# Patient Record
Sex: FEMALE | Race: WHITE | Employment: OTHER | URBAN - METROPOLITAN AREA
[De-identification: names, ages, dates, MRNs, and addresses within clinical notes are randomized per-mention and may not be internally consistent; named-entity substitution may affect disease eponyms.]

---

## 2017-03-27 DIAGNOSIS — K21.00 GASTROESOPHAGEAL REFLUX DISEASE WITH ESOPHAGITIS: ICD-10-CM

## 2017-03-27 RX ORDER — OMEPRAZOLE 20 MG/1
20 CAPSULE, DELAYED RELEASE ORAL DAILY
Qty: 90 CAPSULE | Refills: 3 | Status: SHIPPED | OUTPATIENT
Start: 2017-03-27 | End: 2017-10-16 | Stop reason: SDUPTHER

## 2017-10-03 ENCOUNTER — TELEPHONE (OUTPATIENT)
Dept: INTERNAL MEDICINE CLINIC | Age: 82
End: 2017-10-03

## 2017-10-16 ENCOUNTER — OFFICE VISIT (OUTPATIENT)
Dept: INTERNAL MEDICINE CLINIC | Age: 82
End: 2017-10-16

## 2017-10-16 VITALS
WEIGHT: 152 LBS | HEART RATE: 72 BPM | HEIGHT: 69 IN | BODY MASS INDEX: 22.51 KG/M2 | SYSTOLIC BLOOD PRESSURE: 134 MMHG | TEMPERATURE: 97.7 F | DIASTOLIC BLOOD PRESSURE: 82 MMHG

## 2017-10-16 DIAGNOSIS — E55.9 HYPOVITAMINOSIS D: ICD-10-CM

## 2017-10-16 DIAGNOSIS — K21.9 GASTROESOPHAGEAL REFLUX DISEASE WITHOUT ESOPHAGITIS: ICD-10-CM

## 2017-10-16 DIAGNOSIS — Z00.00 ANNUAL PHYSICAL EXAM: Primary | ICD-10-CM

## 2017-10-16 DIAGNOSIS — K21.00 GASTROESOPHAGEAL REFLUX DISEASE WITH ESOPHAGITIS: ICD-10-CM

## 2017-10-16 DIAGNOSIS — E78.00 PURE HYPERCHOLESTEROLEMIA: ICD-10-CM

## 2017-10-16 LAB
A/G RATIO: 1.9 (ref 1.1–2.2)
ALBUMIN SERPL-MCNC: 4.3 G/DL (ref 3.4–5)
ALP BLD-CCNC: 49 U/L (ref 40–129)
ALT SERPL-CCNC: 10 U/L (ref 10–40)
ANION GAP SERPL CALCULATED.3IONS-SCNC: 12 MMOL/L (ref 3–16)
AST SERPL-CCNC: 17 U/L (ref 15–37)
BILIRUB SERPL-MCNC: 0.5 MG/DL (ref 0–1)
BUN BLDV-MCNC: 14 MG/DL (ref 7–20)
CALCIUM SERPL-MCNC: 9.2 MG/DL (ref 8.3–10.6)
CHLORIDE BLD-SCNC: 102 MMOL/L (ref 99–110)
CHOLESTEROL, TOTAL: 224 MG/DL (ref 0–199)
CO2: 28 MMOL/L (ref 21–32)
CREAT SERPL-MCNC: 1 MG/DL (ref 0.6–1.2)
GFR AFRICAN AMERICAN: >60
GFR NON-AFRICAN AMERICAN: 53
GLOBULIN: 2.3 G/DL
GLUCOSE BLD-MCNC: 86 MG/DL (ref 70–99)
HCT VFR BLD CALC: 39.8 % (ref 36–48)
HDLC SERPL-MCNC: 50 MG/DL (ref 40–60)
HEMOGLOBIN: 13.1 G/DL (ref 12–16)
LDL CHOLESTEROL CALCULATED: 153 MG/DL
MCH RBC QN AUTO: 31.1 PG (ref 26–34)
MCHC RBC AUTO-ENTMCNC: 33 G/DL (ref 31–36)
MCV RBC AUTO: 94.3 FL (ref 80–100)
PDW BLD-RTO: 14 % (ref 12.4–15.4)
PLATELET # BLD: 198 K/UL (ref 135–450)
PMV BLD AUTO: 10.1 FL (ref 5–10.5)
POTASSIUM SERPL-SCNC: 4.4 MMOL/L (ref 3.5–5.1)
RBC # BLD: 4.22 M/UL (ref 4–5.2)
SODIUM BLD-SCNC: 142 MMOL/L (ref 136–145)
TOTAL PROTEIN: 6.6 G/DL (ref 6.4–8.2)
TRIGL SERPL-MCNC: 105 MG/DL (ref 0–150)
TSH SERPL DL<=0.05 MIU/L-ACNC: 3.45 UIU/ML (ref 0.27–4.2)
VITAMIN D 25-HYDROXY: 26.7 NG/ML
VLDLC SERPL CALC-MCNC: 21 MG/DL
WBC # BLD: 5.7 K/UL (ref 4–11)

## 2017-10-16 PROCEDURE — 4040F PNEUMOC VAC/ADMIN/RCVD: CPT | Performed by: INTERNAL MEDICINE

## 2017-10-16 PROCEDURE — 1036F TOBACCO NON-USER: CPT | Performed by: INTERNAL MEDICINE

## 2017-10-16 PROCEDURE — G8420 CALC BMI NORM PARAMETERS: HCPCS | Performed by: INTERNAL MEDICINE

## 2017-10-16 PROCEDURE — 1123F ACP DISCUSS/DSCN MKR DOCD: CPT | Performed by: INTERNAL MEDICINE

## 2017-10-16 PROCEDURE — G8484 FLU IMMUNIZE NO ADMIN: HCPCS | Performed by: INTERNAL MEDICINE

## 2017-10-16 PROCEDURE — G8399 PT W/DXA RESULTS DOCUMENT: HCPCS | Performed by: INTERNAL MEDICINE

## 2017-10-16 PROCEDURE — 36415 COLL VENOUS BLD VENIPUNCTURE: CPT | Performed by: INTERNAL MEDICINE

## 2017-10-16 PROCEDURE — 1090F PRES/ABSN URINE INCON ASSESS: CPT | Performed by: INTERNAL MEDICINE

## 2017-10-16 PROCEDURE — 99214 OFFICE O/P EST MOD 30 MIN: CPT | Performed by: INTERNAL MEDICINE

## 2017-10-16 PROCEDURE — G8427 DOCREV CUR MEDS BY ELIG CLIN: HCPCS | Performed by: INTERNAL MEDICINE

## 2017-10-16 RX ORDER — OMEPRAZOLE 20 MG/1
20 CAPSULE, DELAYED RELEASE ORAL DAILY
Qty: 90 CAPSULE | Refills: 3 | Status: SHIPPED | OUTPATIENT
Start: 2017-10-16 | End: 2018-11-27

## 2017-10-16 ASSESSMENT — PATIENT HEALTH QUESTIONNAIRE - PHQ9
SUM OF ALL RESPONSES TO PHQ9 QUESTIONS 1 & 2: 0
1. LITTLE INTEREST OR PLEASURE IN DOING THINGS: 0
SUM OF ALL RESPONSES TO PHQ QUESTIONS 1-9: 0
2. FEELING DOWN, DEPRESSED OR HOPELESS: 0

## 2017-10-16 ASSESSMENT — ENCOUNTER SYMPTOMS
VOICE CHANGE: 1
RESPIRATORY NEGATIVE: 1
GASTROINTESTINAL NEGATIVE: 1

## 2017-10-16 NOTE — PROGRESS NOTES
Cardiovascular: Negative. Gastrointestinal: Negative. Genitourinary: Negative. Musculoskeletal: Negative. Skin: Negative. Neurological: Negative. Psychiatric/Behavioral: Negative. Objective:   Physical Exam   Constitutional: She is oriented to person, place, and time. She appears well-developed and well-nourished. No distress. HENT:   Head: Normocephalic. Right Ear: External ear normal.   Nose: Nose normal.   Mouth/Throat: Oropharynx is clear and moist. No oropharyngeal exudate. Eyes: Conjunctivae and EOM are normal.   Neck: Normal range of motion. Neck supple. No JVD present. No thyromegaly present. Cardiovascular: Normal rate, regular rhythm and intact distal pulses. Pulmonary/Chest: Effort normal and breath sounds normal. No stridor. She has no rales. Abdominal: Soft. There is no tenderness. Genitourinary: Vagina normal.   Musculoskeletal: Normal range of motion. She exhibits no tenderness. Neurological: She is alert and oriented to person, place, and time. She has normal reflexes. She exhibits normal muscle tone. Skin: Skin is warm and dry. She is not diaphoretic. No erythema. Psychiatric: She has a normal mood and affect. Her behavior is normal. Judgment and thought content normal.       Assessment:      Encounter Diagnoses   Name Primary?  Annual physical exam Yes    Gastroesophageal reflux disease without esophagitis     Pure hypercholesterolemia     Gastroesophageal reflux disease with esophagitis     Hypovitaminosis D            Plan:      Maegan Castro was seen today for medicare awv.     Diagnoses and all orders for this visit:    Annual physical exam  -     TSH without Reflex  -     CBC  -     POCT Urinalysis no Micro    Gastroesophageal reflux disease without esophagitis    Pure hypercholesterolemia  -     Lipid Panel  -     Comprehensive Metabolic Panel  -     CBC    Gastroesophageal reflux disease with esophagitis  -     omeprazole (PRILOSEC) 20 MG delayed release capsule; Take 1 capsule by mouth daily    Hypovitaminosis D  -     Vitamin D 25 Hydroxy      Referred her to ENT for voice changes.

## 2018-01-18 ENCOUNTER — OFFICE VISIT (OUTPATIENT)
Dept: INTERNAL MEDICINE CLINIC | Age: 83
End: 2018-01-18

## 2018-01-18 VITALS
WEIGHT: 155 LBS | DIASTOLIC BLOOD PRESSURE: 84 MMHG | TEMPERATURE: 98.1 F | BODY MASS INDEX: 22.89 KG/M2 | SYSTOLIC BLOOD PRESSURE: 140 MMHG | HEART RATE: 64 BPM

## 2018-01-18 DIAGNOSIS — K21.00 GASTROESOPHAGEAL REFLUX DISEASE WITH ESOPHAGITIS: ICD-10-CM

## 2018-01-18 DIAGNOSIS — E78.00 PURE HYPERCHOLESTEROLEMIA: ICD-10-CM

## 2018-01-18 DIAGNOSIS — M17.11 ARTHRITIS OF RIGHT KNEE: ICD-10-CM

## 2018-01-18 DIAGNOSIS — J02.9 PHARYNGITIS, UNSPECIFIED ETIOLOGY: Primary | ICD-10-CM

## 2018-01-18 PROCEDURE — G8399 PT W/DXA RESULTS DOCUMENT: HCPCS | Performed by: INTERNAL MEDICINE

## 2018-01-18 PROCEDURE — 99214 OFFICE O/P EST MOD 30 MIN: CPT | Performed by: INTERNAL MEDICINE

## 2018-01-18 PROCEDURE — 4040F PNEUMOC VAC/ADMIN/RCVD: CPT | Performed by: INTERNAL MEDICINE

## 2018-01-18 PROCEDURE — 1090F PRES/ABSN URINE INCON ASSESS: CPT | Performed by: INTERNAL MEDICINE

## 2018-01-18 PROCEDURE — 1036F TOBACCO NON-USER: CPT | Performed by: INTERNAL MEDICINE

## 2018-01-18 PROCEDURE — G8427 DOCREV CUR MEDS BY ELIG CLIN: HCPCS | Performed by: INTERNAL MEDICINE

## 2018-01-18 PROCEDURE — G8484 FLU IMMUNIZE NO ADMIN: HCPCS | Performed by: INTERNAL MEDICINE

## 2018-01-18 PROCEDURE — G8420 CALC BMI NORM PARAMETERS: HCPCS | Performed by: INTERNAL MEDICINE

## 2018-01-18 PROCEDURE — 1123F ACP DISCUSS/DSCN MKR DOCD: CPT | Performed by: INTERNAL MEDICINE

## 2018-01-18 RX ORDER — AMOXICILLIN AND CLAVULANATE POTASSIUM 875; 125 MG/1; MG/1
1 TABLET, FILM COATED ORAL 2 TIMES DAILY WITH MEALS
Qty: 20 TABLET | Refills: 0 | Status: SHIPPED | OUTPATIENT
Start: 2018-01-18 | End: 2018-01-28

## 2018-01-18 ASSESSMENT — ENCOUNTER SYMPTOMS
CHOKING: 0
STRIDOR: 0
BACK PAIN: 0
VOICE CHANGE: 1
EYES NEGATIVE: 1
SORE THROAT: 1
RESPIRATORY NEGATIVE: 1
SINUS PRESSURE: 0
COLOR CHANGE: 0
APNEA: 0

## 2018-06-15 ENCOUNTER — OFFICE VISIT (OUTPATIENT)
Dept: INTERNAL MEDICINE CLINIC | Age: 83
End: 2018-06-15

## 2018-06-15 VITALS
DIASTOLIC BLOOD PRESSURE: 70 MMHG | WEIGHT: 148.5 LBS | BODY MASS INDEX: 21.93 KG/M2 | HEART RATE: 72 BPM | OXYGEN SATURATION: 96 % | SYSTOLIC BLOOD PRESSURE: 122 MMHG | TEMPERATURE: 98.1 F

## 2018-06-15 DIAGNOSIS — E78.00 PURE HYPERCHOLESTEROLEMIA: ICD-10-CM

## 2018-06-15 DIAGNOSIS — R53.83 FATIGUE, UNSPECIFIED TYPE: Primary | ICD-10-CM

## 2018-06-15 LAB
BILIRUBIN, POC: NORMAL
BLOOD URINE, POC: NORMAL
CLARITY, POC: CLEAR
COLOR, POC: YELLOW
GLUCOSE URINE, POC: NORMAL
KETONES, POC: NORMAL
LEUKOCYTE EST, POC: NORMAL
NITRITE, POC: NORMAL
PH, POC: 5
PROTEIN, POC: NORMAL
SPECIFIC GRAVITY, POC: 1.02
UROBILINOGEN, POC: 0.2

## 2018-06-15 PROCEDURE — 1090F PRES/ABSN URINE INCON ASSESS: CPT | Performed by: INTERNAL MEDICINE

## 2018-06-15 PROCEDURE — 81002 URINALYSIS NONAUTO W/O SCOPE: CPT | Performed by: INTERNAL MEDICINE

## 2018-06-15 PROCEDURE — G8427 DOCREV CUR MEDS BY ELIG CLIN: HCPCS | Performed by: INTERNAL MEDICINE

## 2018-06-15 PROCEDURE — 1036F TOBACCO NON-USER: CPT | Performed by: INTERNAL MEDICINE

## 2018-06-15 PROCEDURE — 1123F ACP DISCUSS/DSCN MKR DOCD: CPT | Performed by: INTERNAL MEDICINE

## 2018-06-15 PROCEDURE — G8399 PT W/DXA RESULTS DOCUMENT: HCPCS | Performed by: INTERNAL MEDICINE

## 2018-06-15 PROCEDURE — 4040F PNEUMOC VAC/ADMIN/RCVD: CPT | Performed by: INTERNAL MEDICINE

## 2018-06-15 PROCEDURE — G8420 CALC BMI NORM PARAMETERS: HCPCS | Performed by: INTERNAL MEDICINE

## 2018-06-15 PROCEDURE — 99214 OFFICE O/P EST MOD 30 MIN: CPT | Performed by: INTERNAL MEDICINE

## 2018-06-15 ASSESSMENT — ENCOUNTER SYMPTOMS
RESPIRATORY NEGATIVE: 1
GASTROINTESTINAL NEGATIVE: 1
COUGH: 0

## 2018-08-27 ENCOUNTER — OFFICE VISIT (OUTPATIENT)
Dept: INTERNAL MEDICINE CLINIC | Age: 83
End: 2018-08-27

## 2018-08-27 VITALS
OXYGEN SATURATION: 97 % | HEART RATE: 58 BPM | TEMPERATURE: 98.1 F | DIASTOLIC BLOOD PRESSURE: 80 MMHG | BODY MASS INDEX: 22.45 KG/M2 | SYSTOLIC BLOOD PRESSURE: 132 MMHG | WEIGHT: 148.13 LBS | HEIGHT: 68 IN

## 2018-08-27 DIAGNOSIS — E55.9 HYPOVITAMINOSIS D: ICD-10-CM

## 2018-08-27 DIAGNOSIS — M54.50 BILATERAL LOW BACK PAIN WITHOUT SCIATICA, UNSPECIFIED CHRONICITY: Primary | ICD-10-CM

## 2018-08-27 DIAGNOSIS — M81.0 OSTEOPOROSIS WITHOUT CURRENT PATHOLOGICAL FRACTURE, UNSPECIFIED OSTEOPOROSIS TYPE: ICD-10-CM

## 2018-08-27 DIAGNOSIS — E78.00 PURE HYPERCHOLESTEROLEMIA: ICD-10-CM

## 2018-08-27 PROCEDURE — G8399 PT W/DXA RESULTS DOCUMENT: HCPCS | Performed by: INTERNAL MEDICINE

## 2018-08-27 PROCEDURE — 1036F TOBACCO NON-USER: CPT | Performed by: INTERNAL MEDICINE

## 2018-08-27 PROCEDURE — 1090F PRES/ABSN URINE INCON ASSESS: CPT | Performed by: INTERNAL MEDICINE

## 2018-08-27 PROCEDURE — G8427 DOCREV CUR MEDS BY ELIG CLIN: HCPCS | Performed by: INTERNAL MEDICINE

## 2018-08-27 PROCEDURE — 99214 OFFICE O/P EST MOD 30 MIN: CPT | Performed by: INTERNAL MEDICINE

## 2018-08-27 PROCEDURE — 4040F PNEUMOC VAC/ADMIN/RCVD: CPT | Performed by: INTERNAL MEDICINE

## 2018-08-27 PROCEDURE — 1123F ACP DISCUSS/DSCN MKR DOCD: CPT | Performed by: INTERNAL MEDICINE

## 2018-08-27 PROCEDURE — G8420 CALC BMI NORM PARAMETERS: HCPCS | Performed by: INTERNAL MEDICINE

## 2018-08-27 PROCEDURE — 1101F PT FALLS ASSESS-DOCD LE1/YR: CPT | Performed by: INTERNAL MEDICINE

## 2018-08-27 RX ORDER — CELECOXIB 200 MG/1
200 CAPSULE ORAL DAILY
Qty: 30 CAPSULE | Refills: 1 | Status: SHIPPED | OUTPATIENT
Start: 2018-08-27 | End: 2020-08-07

## 2018-08-27 ASSESSMENT — ENCOUNTER SYMPTOMS
EYES NEGATIVE: 1
CHOKING: 0
BACK PAIN: 1
APNEA: 0
ABDOMINAL PAIN: 0
STRIDOR: 0
SINUS PRESSURE: 0
COLOR CHANGE: 0

## 2018-08-27 NOTE — PROGRESS NOTES
Subjective:      Patient ID: Stevenson Larkin is a 80 y.o. female. Patient presents with:  Back Pain: pt fell about 5 months ago. wasn't badly hurt but still has some lower bilateral back pain. affecting her walking. Stevenson Larkin is a 80 y.o. female with the following history as recorded in [employer]:  Patient Active Problem List    GERD (gastroesophageal reflux disease)      Hyperlipidemia      Current Outpatient Prescriptions:  celecoxib (CELEBREX) 200 MG capsule, Take 1 capsule by mouth daily, Disp: 30 capsule, Rfl: 1  diclofenac sodium 1 % GEL, Apply 2 g topically 2 times daily, Disp: 1 Tube, Rfl: 3  omeprazole (PRILOSEC) 20 MG delayed release capsule, Take 1 capsule by mouth daily, Disp: 90 capsule, Rfl: 3    No current facility-administered medications for this visit. Allergies: Levofloxacin  Past Medical History:  No date: Allergic rhinitis  No date: GERD (gastroesophageal reflux disease)  No date: Hyperlipidemia  06/23/2016: Laceration of head  Past Surgical History:  No date: APPENDECTOMY  4-2011: COLONOSCOPY  8/2010: FRACTURE SURGERY      Comment: left Ulna  No date: KNEE SURGERY      Comment: left torn meniscus  Review of patient's family history indicates:  Problem: Arthritis      Relation: Mother       Age of Onset: (Not Specified)   Problem: Heart Attack      Relation: Father       Age of Onset: (Not Specified)     Smoking status: Never Smoker                                                              Smokeless tobacco: Former User                     Alcohol use: No                  Back Pain   This is a new problem. The current episode started more than 1 month ago. The problem occurs constantly. The problem has been gradually worsening since onset. The pain is present in the lumbar spine and thoracic spine. The quality of the pain is described as aching. The pain does not radiate. The pain is moderate. The symptoms are aggravated by lying down and standing. Stiffness is present all day. Associated symptoms include weight loss. Pertinent negatives include no abdominal pain. Risk factors include recent trauma (fall 4-5m ago,before ). She has tried nothing for the symptoms. The treatment provided no relief. Review of Systems   Constitutional: Positive for appetite change, fatigue and weight loss. HENT: Negative. Negative for ear pain, hearing loss and sinus pressure. Eyes: Negative. Respiratory: Negative for apnea, choking and stridor. Gastrointestinal: Negative for abdominal pain. Genitourinary: Positive for frequency. Negative for hematuria. Musculoskeletal: Positive for back pain. Skin: Negative. Negative for color change and pallor. Neurological: Negative. Negative for dizziness, tremors, seizures and syncope. Hematological: Does not bruise/bleed easily. Psychiatric/Behavioral: Negative for confusion, decreased concentration and dysphoric mood. The patient is not nervous/anxious. Objective:   Physical Exam   Constitutional: She is oriented to person, place, and time. She appears well-developed and well-nourished. HENT:   Head: Normocephalic and atraumatic. Right Ear: External ear normal.   Left Ear: External ear normal.   Mouth/Throat: No oropharyngeal exudate. Eyes: Pupils are equal, round, and reactive to light. Conjunctivae and EOM are normal. No scleral icterus. Neck: Normal range of motion. Neck supple. No thyromegaly present. Cardiovascular: Normal rate, regular rhythm and normal heart sounds. No murmur heard. Pulmonary/Chest: Effort normal and breath sounds normal. She has no wheezes. She exhibits no tenderness. Abdominal: Soft. She exhibits no distension and no mass. There is no tenderness. Musculoskeletal: Normal range of motion. She exhibits tenderness. She exhibits no edema. Both side of para spinal muscles are sore. Lymphadenopathy:     She has no cervical adenopathy.    Neurological: She is alert and oriented to person,

## 2018-08-27 NOTE — PATIENT INSTRUCTIONS
Please call your pharmacy if you need any refills of your medication(s). Please call our office at [phone] if you don't hear from us about your test results. Bring an accurate list of your medications with you at every appointment to ensure that we have the correct information.     Our office hours are: Monday - Friday 8:30 am- 5 pm    Phone lines turn on at 8:30 am

## 2018-08-28 ENCOUNTER — NURSE ONLY (OUTPATIENT)
Dept: INTERNAL MEDICINE CLINIC | Age: 83
End: 2018-08-28

## 2018-08-28 DIAGNOSIS — E78.00 PURE HYPERCHOLESTEROLEMIA: ICD-10-CM

## 2018-08-28 DIAGNOSIS — E55.9 HYPOVITAMINOSIS D: ICD-10-CM

## 2018-08-28 DIAGNOSIS — M81.0 OSTEOPOROSIS WITHOUT CURRENT PATHOLOGICAL FRACTURE, UNSPECIFIED OSTEOPOROSIS TYPE: ICD-10-CM

## 2018-08-28 LAB
A/G RATIO: 2.2 (ref 1.1–2.2)
ALBUMIN SERPL-MCNC: 4.4 G/DL (ref 3.4–5)
ALP BLD-CCNC: 48 U/L (ref 40–129)
ALT SERPL-CCNC: 9 U/L (ref 10–40)
ANION GAP SERPL CALCULATED.3IONS-SCNC: 12 MMOL/L (ref 3–16)
AST SERPL-CCNC: 16 U/L (ref 15–37)
BILIRUB SERPL-MCNC: 0.4 MG/DL (ref 0–1)
BILIRUBIN, POC: NORMAL
BLOOD URINE, POC: NORMAL
BUN BLDV-MCNC: 16 MG/DL (ref 7–20)
CALCIUM SERPL-MCNC: 9.4 MG/DL (ref 8.3–10.6)
CHLORIDE BLD-SCNC: 105 MMOL/L (ref 99–110)
CHOLESTEROL, TOTAL: 209 MG/DL (ref 0–199)
CLARITY, POC: CLEAR
CO2: 28 MMOL/L (ref 21–32)
COLOR, POC: NORMAL
CREAT SERPL-MCNC: 1.1 MG/DL (ref 0.6–1.2)
GFR AFRICAN AMERICAN: 57
GFR NON-AFRICAN AMERICAN: 47
GLOBULIN: 2 G/DL
GLUCOSE BLD-MCNC: 87 MG/DL (ref 70–99)
GLUCOSE URINE, POC: NORMAL
HCT VFR BLD CALC: 39.8 % (ref 36–48)
HDLC SERPL-MCNC: 49 MG/DL (ref 40–60)
HEMOGLOBIN: 13 G/DL (ref 12–16)
KETONES, POC: NORMAL
LDL CHOLESTEROL CALCULATED: 140 MG/DL
LEUKOCYTE EST, POC: NORMAL
MCH RBC QN AUTO: 31.6 PG (ref 26–34)
MCHC RBC AUTO-ENTMCNC: 32.7 G/DL (ref 31–36)
MCV RBC AUTO: 96.4 FL (ref 80–100)
NITRITE, POC: NORMAL
PDW BLD-RTO: 13.9 % (ref 12.4–15.4)
PH, POC: 5
PLATELET # BLD: 210 K/UL (ref 135–450)
PMV BLD AUTO: 10.1 FL (ref 5–10.5)
POTASSIUM SERPL-SCNC: 4.6 MMOL/L (ref 3.5–5.1)
PROTEIN, POC: NORMAL
RBC # BLD: 4.13 M/UL (ref 4–5.2)
SODIUM BLD-SCNC: 145 MMOL/L (ref 136–145)
SPECIFIC GRAVITY, POC: 1.02
TOTAL PROTEIN: 6.4 G/DL (ref 6.4–8.2)
TRIGL SERPL-MCNC: 98 MG/DL (ref 0–150)
TSH SERPL DL<=0.05 MIU/L-ACNC: 4.67 UIU/ML (ref 0.27–4.2)
UROBILINOGEN, POC: 0.2
VITAMIN D 25-HYDROXY: 30.8 NG/ML
VLDLC SERPL CALC-MCNC: 20 MG/DL
WBC # BLD: 4.6 K/UL (ref 4–11)

## 2018-08-28 PROCEDURE — 36415 COLL VENOUS BLD VENIPUNCTURE: CPT | Performed by: INTERNAL MEDICINE

## 2018-08-28 PROCEDURE — 81002 URINALYSIS NONAUTO W/O SCOPE: CPT | Performed by: INTERNAL MEDICINE

## 2018-11-27 ENCOUNTER — OFFICE VISIT (OUTPATIENT)
Dept: INTERNAL MEDICINE CLINIC | Age: 83
End: 2018-11-27
Payer: MEDICARE

## 2018-11-27 VITALS
HEART RATE: 77 BPM | SYSTOLIC BLOOD PRESSURE: 118 MMHG | OXYGEN SATURATION: 97 % | BODY MASS INDEX: 22.43 KG/M2 | DIASTOLIC BLOOD PRESSURE: 72 MMHG | TEMPERATURE: 98.2 F | WEIGHT: 145.38 LBS

## 2018-11-27 DIAGNOSIS — J06.9 ACUTE URI: Primary | ICD-10-CM

## 2018-11-27 PROCEDURE — 1036F TOBACCO NON-USER: CPT | Performed by: INTERNAL MEDICINE

## 2018-11-27 PROCEDURE — G8420 CALC BMI NORM PARAMETERS: HCPCS | Performed by: INTERNAL MEDICINE

## 2018-11-27 PROCEDURE — 1090F PRES/ABSN URINE INCON ASSESS: CPT | Performed by: INTERNAL MEDICINE

## 2018-11-27 PROCEDURE — 1123F ACP DISCUSS/DSCN MKR DOCD: CPT | Performed by: INTERNAL MEDICINE

## 2018-11-27 PROCEDURE — 4040F PNEUMOC VAC/ADMIN/RCVD: CPT | Performed by: INTERNAL MEDICINE

## 2018-11-27 PROCEDURE — G8484 FLU IMMUNIZE NO ADMIN: HCPCS | Performed by: INTERNAL MEDICINE

## 2018-11-27 PROCEDURE — G8427 DOCREV CUR MEDS BY ELIG CLIN: HCPCS | Performed by: INTERNAL MEDICINE

## 2018-11-27 PROCEDURE — 99213 OFFICE O/P EST LOW 20 MIN: CPT | Performed by: INTERNAL MEDICINE

## 2018-11-27 PROCEDURE — 1101F PT FALLS ASSESS-DOCD LE1/YR: CPT | Performed by: INTERNAL MEDICINE

## 2018-11-27 PROCEDURE — G8399 PT W/DXA RESULTS DOCUMENT: HCPCS | Performed by: INTERNAL MEDICINE

## 2018-11-27 RX ORDER — AMOXICILLIN AND CLAVULANATE POTASSIUM 875; 125 MG/1; MG/1
1 TABLET, FILM COATED ORAL 2 TIMES DAILY WITH MEALS
Qty: 20 TABLET | Refills: 0 | Status: SHIPPED | OUTPATIENT
Start: 2018-11-27 | End: 2018-12-07

## 2018-11-27 RX ORDER — GUAIFENESIN AND CODEINE PHOSPHATE 100; 10 MG/5ML; MG/5ML
10 SOLUTION ORAL 3 TIMES DAILY PRN
Qty: 120 ML | Refills: 0 | Status: SHIPPED | OUTPATIENT
Start: 2018-11-27 | End: 2018-12-04

## 2018-11-27 ASSESSMENT — ENCOUNTER SYMPTOMS
GASTROINTESTINAL NEGATIVE: 1
COUGH: 1
RHINORRHEA: 1
SORE THROAT: 1

## 2018-11-27 NOTE — PROGRESS NOTES
Encounter Diagnosis   Name Primary?  Acute URI Yes           Plan:      Sly was seen today for hoarse. Diagnoses and all orders for this visit:    Acute URI  -     amoxicillin-clavulanate (AUGMENTIN) 875-125 MG per tablet; Take 1 tablet by mouth 2 times daily (with meals) for 10 days  -     guaiFENesin-codeine (TUSSI-ORGANIDIN NR) 100-10 MG/5ML syrup; Take 10 mLs by mouth 3 times daily as needed for Cough for up to 7 days. Anny Juarez MD

## 2019-01-14 ENCOUNTER — TELEPHONE (OUTPATIENT)
Dept: INTERNAL MEDICINE CLINIC | Age: 84
End: 2019-01-14

## 2019-01-14 ENCOUNTER — NURSE TRIAGE (OUTPATIENT)
Dept: OTHER | Facility: CLINIC | Age: 84
End: 2019-01-14

## 2019-01-15 ENCOUNTER — OFFICE VISIT (OUTPATIENT)
Dept: INTERNAL MEDICINE CLINIC | Age: 84
End: 2019-01-15
Payer: MEDICARE

## 2019-01-15 VITALS
OXYGEN SATURATION: 96 % | WEIGHT: 145 LBS | DIASTOLIC BLOOD PRESSURE: 68 MMHG | HEART RATE: 64 BPM | TEMPERATURE: 98.1 F | SYSTOLIC BLOOD PRESSURE: 118 MMHG | BODY MASS INDEX: 22.38 KG/M2
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
## 2019-01-15 DIAGNOSIS — E78.00 PURE HYPERCHOLESTEROLEMIA: ICD-10-CM

## 2019-01-15 DIAGNOSIS — R93.0 ABNORMAL CT OF THE HEAD: ICD-10-CM

## 2019-01-15 PROCEDURE — G8399 PT W/DXA RESULTS DOCUMENT: HCPCS | Performed by: INTERNAL MEDICINE

## 2019-01-15 PROCEDURE — 99214 OFFICE O/P EST MOD 30 MIN: CPT | Performed by: INTERNAL MEDICINE

## 2019-01-15 PROCEDURE — 1123F ACP DISCUSS/DSCN MKR DOCD: CPT | Performed by: INTERNAL MEDICINE

## 2019-01-15 PROCEDURE — 1101F PT FALLS ASSESS-DOCD LE1/YR: CPT | Performed by: INTERNAL MEDICINE

## 2019-01-15 PROCEDURE — G8420 CALC BMI NORM PARAMETERS: HCPCS | Performed by: INTERNAL MEDICINE

## 2019-01-15 PROCEDURE — 1090F PRES/ABSN URINE INCON ASSESS: CPT | Performed by: INTERNAL MEDICINE

## 2019-01-15 PROCEDURE — G8427 DOCREV CUR MEDS BY ELIG CLIN: HCPCS | Performed by: INTERNAL MEDICINE

## 2019-01-15 PROCEDURE — G8484 FLU IMMUNIZE NO ADMIN: HCPCS | Performed by: INTERNAL MEDICINE

## 2019-01-15 PROCEDURE — 1036F TOBACCO NON-USER: CPT | Performed by: INTERNAL MEDICINE

## 2019-01-15 PROCEDURE — 4040F PNEUMOC VAC/ADMIN/RCVD: CPT | Performed by: INTERNAL MEDICINE

## 2019-01-15 ASSESSMENT — ENCOUNTER SYMPTOMS
ABDOMINAL PAIN: 0
VISUAL CHANGE: 1
VOMITING: 1

## 2019-02-22 ENCOUNTER — TELEPHONE (OUTPATIENT)
Dept: INTERNAL MEDICINE CLINIC | Age: 84
End: 2019-02-22

## 2019-03-05 ENCOUNTER — TELEPHONE (OUTPATIENT)
Dept: INTERNAL MEDICINE CLINIC | Age: 84
End: 2019-03-05

## 2019-03-05 DIAGNOSIS — R29.6 FALLING EPISODES: Primary | ICD-10-CM

## 2019-03-05 DIAGNOSIS — R90.89 ABNORMAL BRAIN MRI: ICD-10-CM

## 2019-03-06 ENCOUNTER — TELEPHONE (OUTPATIENT)
Dept: INTERNAL MEDICINE CLINIC | Age: 84
End: 2019-03-06

## 2019-07-03 ENCOUNTER — TELEPHONE (OUTPATIENT)
Dept: INTERNAL MEDICINE CLINIC | Age: 84
End: 2019-07-03

## 2019-07-16 ENCOUNTER — OFFICE VISIT (OUTPATIENT)
Dept: INTERNAL MEDICINE CLINIC | Age: 84
End: 2019-07-16
Payer: MEDICARE

## 2019-07-16 VITALS
DIASTOLIC BLOOD PRESSURE: 60 MMHG | WEIGHT: 140.4 LBS | SYSTOLIC BLOOD PRESSURE: 100 MMHG | HEART RATE: 69 BPM | OXYGEN SATURATION: 75 % | TEMPERATURE: 98.3 F | BODY MASS INDEX: 21.28 KG/M2 | HEIGHT: 68 IN

## 2019-07-16 DIAGNOSIS — M19.90 ARTHRITIS: ICD-10-CM

## 2019-07-16 DIAGNOSIS — R90.89 ABNORMAL BRAIN MRI: ICD-10-CM

## 2019-07-16 DIAGNOSIS — E78.00 PURE HYPERCHOLESTEROLEMIA: ICD-10-CM

## 2019-07-16 DIAGNOSIS — R29.6 FALLING EPISODES: ICD-10-CM

## 2019-07-16 DIAGNOSIS — R29.898 WEAKNESS OF BOTH LEGS: Primary | ICD-10-CM

## 2019-07-16 PROCEDURE — 99214 OFFICE O/P EST MOD 30 MIN: CPT | Performed by: INTERNAL MEDICINE

## 2019-07-16 PROCEDURE — 1036F TOBACCO NON-USER: CPT | Performed by: INTERNAL MEDICINE

## 2019-07-16 PROCEDURE — G8399 PT W/DXA RESULTS DOCUMENT: HCPCS | Performed by: INTERNAL MEDICINE

## 2019-07-16 PROCEDURE — 1090F PRES/ABSN URINE INCON ASSESS: CPT | Performed by: INTERNAL MEDICINE

## 2019-07-16 PROCEDURE — 4040F PNEUMOC VAC/ADMIN/RCVD: CPT | Performed by: INTERNAL MEDICINE

## 2019-07-16 PROCEDURE — G8427 DOCREV CUR MEDS BY ELIG CLIN: HCPCS | Performed by: INTERNAL MEDICINE

## 2019-07-16 PROCEDURE — 1123F ACP DISCUSS/DSCN MKR DOCD: CPT | Performed by: INTERNAL MEDICINE

## 2019-07-16 PROCEDURE — G8420 CALC BMI NORM PARAMETERS: HCPCS | Performed by: INTERNAL MEDICINE

## 2019-07-16 RX ORDER — ATORVASTATIN CALCIUM 40 MG/1
40 TABLET, FILM COATED ORAL DAILY
COMMUNITY
End: 2019-07-16 | Stop reason: SDUPTHER

## 2019-07-16 RX ORDER — ATORVASTATIN CALCIUM 40 MG/1
40 TABLET, FILM COATED ORAL DAILY
Qty: 90 TABLET | Refills: 3 | Status: SHIPPED | OUTPATIENT
Start: 2019-07-16 | End: 2020-04-27

## 2019-07-16 ASSESSMENT — PATIENT HEALTH QUESTIONNAIRE - PHQ9
1. LITTLE INTEREST OR PLEASURE IN DOING THINGS: 0
SUM OF ALL RESPONSES TO PHQ QUESTIONS 1-9: 0
2. FEELING DOWN, DEPRESSED OR HOPELESS: 0
SUM OF ALL RESPONSES TO PHQ QUESTIONS 1-9: 0
SUM OF ALL RESPONSES TO PHQ9 QUESTIONS 1 & 2: 0

## 2019-07-16 ASSESSMENT — ENCOUNTER SYMPTOMS
BACK PAIN: 0
CHOKING: 0
NAUSEA: 0
APNEA: 0
STRIDOR: 0
EYES NEGATIVE: 1
SINUS PRESSURE: 0
COLOR CHANGE: 0

## 2019-07-16 NOTE — PROGRESS NOTES
Nothing aggravates the symptoms. She has tried nothing for the symptoms. The treatment provided no relief. Review of Systems   Constitutional: Negative. Negative for fatigue. HENT: Negative for congestion, ear pain, hearing loss and sinus pressure. Eyes: Negative. Respiratory: Negative for apnea, choking and stridor. Gastrointestinal: Negative for nausea. Genitourinary: Negative for hematuria. Musculoskeletal: Negative for back pain and myalgias. Skin: Negative for color change and pallor. Neurological: Positive for weakness (both lower legs are weak. ). Negative for dizziness, tremors, seizures, syncope, speech difficulty and headaches. Unsteady gate. Hematological: Does not bruise/bleed easily. Psychiatric/Behavioral: Negative for confusion, decreased concentration and dysphoric mood. Objective:   Physical Exam   Constitutional: She is oriented to person, place, and time. She appears well-developed and well-nourished. No distress. HENT:   Head: Normocephalic and atraumatic. Right Ear: External ear normal.   Left Ear: External ear normal.   Nose: Nose normal.   Mouth/Throat: Oropharynx is clear and moist. No oropharyngeal exudate. Eyes: Pupils are equal, round, and reactive to light. Conjunctivae and EOM are normal. Right eye exhibits no discharge. Left eye exhibits no discharge. No scleral icterus. Neck: Normal range of motion. Neck supple. No JVD present. No tracheal deviation present. No thyromegaly present. Cardiovascular: Normal rate, regular rhythm, normal heart sounds and intact distal pulses. Exam reveals no gallop and no friction rub. No murmur heard. Pulmonary/Chest: Effort normal and breath sounds normal. No stridor. No respiratory distress. She has no wheezes. She has no rales. She exhibits no tenderness. Abdominal: Soft. She exhibits no distension and no mass. There is no tenderness. There is no rebound and no guarding.    Genitourinary: Vagina

## 2019-07-22 ENCOUNTER — TELEPHONE (OUTPATIENT)
Dept: INTERNAL MEDICINE CLINIC | Age: 84
End: 2019-07-22

## 2019-07-22 DIAGNOSIS — M19.90 ARTHRITIS: ICD-10-CM

## 2019-07-22 NOTE — TELEPHONE ENCOUNTER
[address] [facility]. in [place] calling about the directions on the diclofenac (SOLARAZE) 3 % gel. There are 2 sets of directions. One set states Apply topically 3 times daily  And the other states Apply topically 2 times daily. [employer] needs to know which is correct.

## 2019-08-13 ENCOUNTER — OFFICE VISIT (OUTPATIENT)
Dept: INTERNAL MEDICINE CLINIC | Age: 84
End: 2019-08-13
Payer: MEDICARE

## 2019-08-13 VITALS
OXYGEN SATURATION: 98 % | DIASTOLIC BLOOD PRESSURE: 78 MMHG | TEMPERATURE: 98.5 F | HEART RATE: 70 BPM | WEIGHT: 141.38 LBS | BODY MASS INDEX: 21.82 KG/M2 | SYSTOLIC BLOOD PRESSURE: 124 MMHG

## 2019-08-13 DIAGNOSIS — M19.90 ARTHRITIS: ICD-10-CM

## 2019-08-13 DIAGNOSIS — M81.0 OSTEOPOROSIS WITHOUT CURRENT PATHOLOGICAL FRACTURE, UNSPECIFIED OSTEOPOROSIS TYPE: ICD-10-CM

## 2019-08-13 DIAGNOSIS — R29.898 WEAKNESS OF BOTH LEGS: Primary | ICD-10-CM

## 2019-08-13 PROCEDURE — 1090F PRES/ABSN URINE INCON ASSESS: CPT | Performed by: INTERNAL MEDICINE

## 2019-08-13 PROCEDURE — G8399 PT W/DXA RESULTS DOCUMENT: HCPCS | Performed by: INTERNAL MEDICINE

## 2019-08-13 PROCEDURE — G8420 CALC BMI NORM PARAMETERS: HCPCS | Performed by: INTERNAL MEDICINE

## 2019-08-13 PROCEDURE — 4040F PNEUMOC VAC/ADMIN/RCVD: CPT | Performed by: INTERNAL MEDICINE

## 2019-08-13 PROCEDURE — G8427 DOCREV CUR MEDS BY ELIG CLIN: HCPCS | Performed by: INTERNAL MEDICINE

## 2019-08-13 PROCEDURE — 99214 OFFICE O/P EST MOD 30 MIN: CPT | Performed by: INTERNAL MEDICINE

## 2019-08-13 PROCEDURE — 1036F TOBACCO NON-USER: CPT | Performed by: INTERNAL MEDICINE

## 2019-08-13 PROCEDURE — 1123F ACP DISCUSS/DSCN MKR DOCD: CPT | Performed by: INTERNAL MEDICINE

## 2019-08-13 RX ORDER — ASPIRIN 325 MG
325 TABLET ORAL
Status: ON HOLD | COMMUNITY
End: 2022-06-02 | Stop reason: HOSPADM

## 2019-08-13 ASSESSMENT — ENCOUNTER SYMPTOMS
GASTROINTESTINAL NEGATIVE: 1
RESPIRATORY NEGATIVE: 1

## 2020-03-09 ENCOUNTER — OFFICE VISIT (OUTPATIENT)
Dept: INTERNAL MEDICINE CLINIC | Age: 85
End: 2020-03-09
Payer: MEDICARE

## 2020-03-09 VITALS
SYSTOLIC BLOOD PRESSURE: 144 MMHG | BODY MASS INDEX: 22.07 KG/M2 | DIASTOLIC BLOOD PRESSURE: 80 MMHG | OXYGEN SATURATION: 97 % | HEART RATE: 57 BPM | WEIGHT: 143 LBS | TEMPERATURE: 98.1 F

## 2020-03-09 PROCEDURE — 1036F TOBACCO NON-USER: CPT | Performed by: INTERNAL MEDICINE

## 2020-03-09 PROCEDURE — G8420 CALC BMI NORM PARAMETERS: HCPCS | Performed by: INTERNAL MEDICINE

## 2020-03-09 PROCEDURE — 99214 OFFICE O/P EST MOD 30 MIN: CPT | Performed by: INTERNAL MEDICINE

## 2020-03-09 PROCEDURE — G8427 DOCREV CUR MEDS BY ELIG CLIN: HCPCS | Performed by: INTERNAL MEDICINE

## 2020-03-09 PROCEDURE — 1123F ACP DISCUSS/DSCN MKR DOCD: CPT | Performed by: INTERNAL MEDICINE

## 2020-03-09 PROCEDURE — 4040F PNEUMOC VAC/ADMIN/RCVD: CPT | Performed by: INTERNAL MEDICINE

## 2020-03-09 PROCEDURE — G8399 PT W/DXA RESULTS DOCUMENT: HCPCS | Performed by: INTERNAL MEDICINE

## 2020-03-09 PROCEDURE — 1090F PRES/ABSN URINE INCON ASSESS: CPT | Performed by: INTERNAL MEDICINE

## 2020-03-09 PROCEDURE — G8484 FLU IMMUNIZE NO ADMIN: HCPCS | Performed by: INTERNAL MEDICINE

## 2020-03-09 ASSESSMENT — ENCOUNTER SYMPTOMS
CHOKING: 0
BACK PAIN: 0
SINUS PRESSURE: 0
APNEA: 0
SHORTNESS OF BREATH: 0
RESPIRATORY NEGATIVE: 1
EYES NEGATIVE: 1
COLOR CHANGE: 0
GASTROINTESTINAL NEGATIVE: 1
STRIDOR: 0

## 2020-03-09 NOTE — PROGRESS NOTES
Subjective:      Patient ID: Claude Parry is a 80 y.o. female. Patient presents with:  Blood Pressure Check: follow up on elevated BP    Claude Parry is a 80 y.o. female [facility] the [facility]  Patient Active Problem List    GERD (gastroesophageal reflux disease)      Hyperlipidemia      Current Outpatient Medications:  aspirin 325 MG tablet, Take 325 mg by mouth, Disp: , Rfl:   atorvastatin (LIPITOR) 40 MG tablet, Take 1 tablet by mouth daily, Disp: 90 tablet, Rfl: 3  Omeprazole (PRILOSEC PO), Take by mouth, Disp: , Rfl:   celecoxib (CELEBREX) 200 MG capsule, Take 1 capsule by mouth daily, Disp: 30 capsule, Rfl: 1    No current facility-administered medications for this visit. Allergies: Levofloxacin  Past Medical History:  No date: Allergic rhinitis  No date: GERD (gastroesophageal reflux disease)  No date: Hyperlipidemia  06/23/2016: Laceration of head  Past Surgical History:  No date: APPENDECTOMY  4-2011: COLONOSCOPY  8/2010: FRACTURE SURGERY      Comment:  left Ulna  No date: KNEE SURGERY      Comment:  left torn meniscus  Review of patient's family history indicates:  Problem: Arthritis      Relation: Mother          Age of Onset: (Not Specified)  Problem: Heart Attack      Relation: Father          Age of Onset: (Not Specified)    Social History    Tobacco Use      Smoking status: Never Smoker      Smokeless tobacco: Former User    Alcohol use: No      Hypertension   This is a new problem. The problem is controlled. Pertinent negatives include no anxiety or shortness of breath. There are no associated agents to hypertension. Risk factors for coronary artery disease include diabetes mellitus. Past treatments include nothing. There are no compliance problems. Review of Systems   Constitutional: Negative. Negative for fatigue. HENT: Positive for congestion. Negative for ear pain, hearing loss and sinus pressure. Eyes: Negative. Respiratory: Negative. Negative for apnea, choking, shortness of breath and stridor. Cardiovascular: Negative. Gastrointestinal: Negative. Genitourinary: Negative. Negative for hematuria. Musculoskeletal: Negative. Negative for back pain. Skin: Negative. Negative for color change and pallor. Neurological: Positive for dizziness and light-headedness. Negative for tremors, seizures and syncope. Hematological: Does not bruise/bleed easily. Psychiatric/Behavioral: Negative for confusion, decreased concentration and dysphoric mood. The patient is not nervous/anxious. Objective:   Physical Exam  Constitutional:       General: She is not in acute distress. Appearance: She is well-developed. She is not diaphoretic. HENT:      Head: Normocephalic and atraumatic. Right Ear: External ear normal.      Left Ear: External ear normal.      Nose: Nose normal.      Mouth/Throat:      Pharynx: No oropharyngeal exudate. Eyes:      General: No scleral icterus. Right eye: No discharge. Left eye: No discharge. Conjunctiva/sclera: Conjunctivae normal.      Pupils: Pupils are equal, round, and reactive to light. Neck:      Musculoskeletal: Normal range of motion and neck supple. Thyroid: No thyromegaly. Vascular: No JVD. Trachea: No tracheal deviation. Cardiovascular:      Rate and Rhythm: Normal rate and regular rhythm. Heart sounds: Normal heart sounds. No murmur. No friction rub. No gallop. Pulmonary:      Effort: Pulmonary effort is normal. No respiratory distress. Breath sounds: Normal breath sounds. No stridor. No wheezing or rales. Chest:      Chest wall: No tenderness. Abdominal:      General: There is no distension. Palpations: Abdomen is soft. There is no mass. Tenderness: There is no abdominal tenderness. There is no guarding or rebound. Genitourinary:     Vagina: Normal. No vaginal discharge. Rectum: Guaiac result negative.

## 2020-04-27 RX ORDER — ATORVASTATIN CALCIUM 40 MG/1
TABLET, FILM COATED ORAL
Qty: 90 TABLET | Refills: 0 | Status: SHIPPED | OUTPATIENT
Start: 2020-04-27 | End: 2020-08-07

## 2020-08-07 ENCOUNTER — APPOINTMENT (OUTPATIENT)
Dept: GENERAL RADIOLOGY | Age: 85
End: 2020-08-07
Payer: MEDICARE

## 2020-08-07 ENCOUNTER — HOSPITAL ENCOUNTER (EMERGENCY)
Age: 85
Discharge: HOME | End: 2020-08-07
Attending: EMERGENCY MEDICINE
Payer: MEDICARE

## 2020-08-07 VITALS
SYSTOLIC BLOOD PRESSURE: 135 MMHG | HEART RATE: 68 BPM | OXYGEN SATURATION: 98 % | DIASTOLIC BLOOD PRESSURE: 83 MMHG | TEMPERATURE: 98.2 F | BODY MASS INDEX: 21.71 KG/M2 | HEIGHT: 69 IN | WEIGHT: 146.61 LBS | RESPIRATION RATE: 16 BRPM

## 2020-08-07 PROCEDURE — 2580000003 HC RX 258: Performed by: NURSE PRACTITIONER

## 2020-08-07 PROCEDURE — 25605 CLTX DST RDL FX/EPHYS SEP W/: CPT

## 2020-08-07 PROCEDURE — 73100 X-RAY EXAM OF WRIST: CPT

## 2020-08-07 PROCEDURE — 73110 X-RAY EXAM OF WRIST: CPT

## 2020-08-07 PROCEDURE — 96365 THER/PROPH/DIAG IV INF INIT: CPT

## 2020-08-07 PROCEDURE — 94761 N-INVAS EAR/PLS OXIMETRY MLT: CPT

## 2020-08-07 PROCEDURE — 6360000002 HC RX W HCPCS: Performed by: NURSE PRACTITIONER

## 2020-08-07 PROCEDURE — 99283 EMERGENCY DEPT VISIT LOW MDM: CPT

## 2020-08-07 PROCEDURE — 12002 RPR S/N/AX/GEN/TRNK2.6-7.5CM: CPT

## 2020-08-07 PROCEDURE — 2700000000 HC OXYGEN THERAPY PER DAY

## 2020-08-07 PROCEDURE — 94770 HC ETCO2 MONITOR DAILY: CPT

## 2020-08-07 PROCEDURE — 96375 TX/PRO/DX INJ NEW DRUG ADDON: CPT

## 2020-08-07 RX ORDER — CEPHALEXIN 500 MG/1
500 CAPSULE ORAL 2 TIMES DAILY
Qty: 10 CAPSULE | Refills: 0 | Status: SHIPPED | OUTPATIENT
Start: 2020-08-07 | End: 2020-08-12

## 2020-08-07 RX ORDER — MIDAZOLAM HYDROCHLORIDE 1 MG/ML
3 INJECTION INTRAMUSCULAR; INTRAVENOUS ONCE
Status: COMPLETED | OUTPATIENT
Start: 2020-08-07 | End: 2020-08-07

## 2020-08-07 RX ORDER — HYDROCODONE BITARTRATE AND ACETAMINOPHEN 5; 325 MG/1; MG/1
1 TABLET ORAL EVERY 6 HOURS PRN
Qty: 15 TABLET | Refills: 0 | Status: SHIPPED | OUTPATIENT
Start: 2020-08-07 | End: 2020-08-12

## 2020-08-07 RX ORDER — MORPHINE SULFATE 4 MG/ML
4 INJECTION, SOLUTION INTRAMUSCULAR; INTRAVENOUS ONCE
Status: COMPLETED | OUTPATIENT
Start: 2020-08-07 | End: 2020-08-07

## 2020-08-07 RX ORDER — ONDANSETRON 2 MG/ML
4 INJECTION INTRAMUSCULAR; INTRAVENOUS ONCE
Status: COMPLETED | OUTPATIENT
Start: 2020-08-07 | End: 2020-08-07

## 2020-08-07 RX ADMIN — MORPHINE SULFATE 4 MG: 4 INJECTION INTRAVENOUS at 20:41

## 2020-08-07 RX ADMIN — CEFAZOLIN SODIUM 1 G: 1 INJECTION, POWDER, FOR SOLUTION INTRAMUSCULAR; INTRAVENOUS at 21:28

## 2020-08-07 RX ADMIN — MIDAZOLAM 3 MG: 1 INJECTION INTRAMUSCULAR; INTRAVENOUS at 20:41

## 2020-08-07 RX ADMIN — ONDANSETRON 4 MG: 2 INJECTION INTRAMUSCULAR; INTRAVENOUS at 20:39

## 2020-08-07 ASSESSMENT — PAIN DESCRIPTION - ONSET: ONSET: SUDDEN

## 2020-08-07 ASSESSMENT — PAIN DESCRIPTION - PAIN TYPE
TYPE: ACUTE PAIN
TYPE: ACUTE PAIN

## 2020-08-07 ASSESSMENT — ENCOUNTER SYMPTOMS
FACIAL SWELLING: 0
BACK PAIN: 0
NAUSEA: 0
COLOR CHANGE: 0
VOMITING: 0
SHORTNESS OF BREATH: 0
ABDOMINAL PAIN: 0

## 2020-08-07 ASSESSMENT — PAIN DESCRIPTION - LOCATION
LOCATION: WRIST
LOCATION: WRIST

## 2020-08-07 ASSESSMENT — PAIN DESCRIPTION - ORIENTATION
ORIENTATION: LEFT
ORIENTATION: LEFT

## 2020-08-07 ASSESSMENT — PAIN SCALES - GENERAL
PAINLEVEL_OUTOF10: 3
PAINLEVEL_OUTOF10: 4
PAINLEVEL_OUTOF10: 4

## 2020-08-07 ASSESSMENT — PAIN DESCRIPTION - FREQUENCY: FREQUENCY: CONTINUOUS

## 2020-08-07 ASSESSMENT — PAIN DESCRIPTION - PROGRESSION: CLINICAL_PROGRESSION: GRADUALLY WORSENING

## 2020-08-07 ASSESSMENT — PAIN - FUNCTIONAL ASSESSMENT: PAIN_FUNCTIONAL_ASSESSMENT: 0-10

## 2020-08-07 ASSESSMENT — PAIN DESCRIPTION - DESCRIPTORS: DESCRIPTORS: THROBBING

## 2020-08-07 NOTE — ED NOTES
Bed: [facility]  Expected date:   Expected time:   Means of arrival:   Comments:   Ankle deformity      Jannett Landau, RN  08/07/20 1905

## 2020-08-08 NOTE — ED PROVIDER NOTES
tenderness or step-off. Neurological: Alert and oriented x 3. Speech clear. No acute focal motor or sensory deficits. Dysarthria. No aphasia. Skin: Skin is warm and dry. No rash. Psychiatric: Normal mood and affect. Behavior is normal.     DIAGNOSTIC RESULTS     EKG: All EKG's are interpreted by the Emergency Department Physician who either signs or Co-signs this chart in the absence of a cardiologist.        RADIOLOGY:   Non-plain film images such as CT, Ultrasound and MRI are read by the radiologist. Plain radiographic images are visualized and preliminarily interpreted by the emergency physician with the below findings:        Interpretation per the Radiologist below, if available at the time of this note:    XR WRIST LEFT (2 VIEWS)   Final Result   Interval close reduction of distal radius and ulnar fractures. XR WRIST LEFT (MIN 3 VIEWS)   Final Result   Distal radial and ulnar fractures with significant displacement and   angulation of distal fracture fragments. ED BEDSIDE ULTRASOUND:   Performed by ED Physician - none    LABS:  Labs Reviewed - No data to display    All other labs were within normal range or not returned as of this dictation. EMERGENCY DEPARTMENT COURSE and DIFFERENTIAL DIAGNOSIS/MDM:   Vitals:    Vitals:    08/07/20 2058 08/07/20 2101 08/07/20 2104 08/07/20 2107   BP: (!) 156/74 (!) 168/75 (!) 144/88 (!) 163/74   Pulse: 72 67 68 65   Resp: 20 23 16 19   Temp:       TempSrc:       SpO2: 100% 100% 100% 100%   Weight:       Height:           Patient presented with an injury to the left wrist as noted above. X-rays were reviewed. She has a significantly displaced left distal radius and ulna fracture distal to her prior hardware in the ulna. She is neurovascularly intact. Procedure was explained to the patient. She was reduced in the emergency department as noted below with improved anatomic alignment. .  Volar splint was applied.   The midlevel provider spoke with the orthopedic surgeon who reviewed a photo of the wound in addition to radiology images. Care was also discussed with her granddaughter who accompanied the patient to the emergency department. She was advised to follow-up on Monday for reexamination with the orthopedic surgeon. She will need definitive operative intervention. This was discussed with the patient and her granddaughter. MDM      CRITICAL CARE TIME   Total Critical Care time was 0 minutes, excluding separately reportable procedures. There was a high probability of clinically significant/life threatening deterioration in the patient's condition which required my urgent intervention. CONSULTS:  None    PROCEDURES:  Unless otherwise noted below, none     Procedures    Left wrist fracture closed reduction:    Informed consent was given. Risk and benefits were discussed including but not limited the possibility of bruising bleeding infection nerve injury, or vascular injury. Patient agreed to proceed. She was premedicated with morphine 4 mg IV, Zofran 4 mg IV, and Versed 3 mg IV. Longitudinal traction was applied to the left wrist and forearm. Fracture was reduced by the physician assistant, with my assistance. There was good anatomic alignment. Following the procedure there was a strong radial pulse. Capillary refill less than 2 seconds in all digits. Radial median and ulnar nerve are fully intact. Skin was intact with the exception of the pre-existing laceration noted above. Wound care was provided to the laceration and volar splint was applied. Postreduction x-ray revealed significantly improved anatomical alignment. Patient tolerated the procedure well. Left forearm laceration repair:    Please refer to the physician assistant's dictation regarding laceration repair    Moderate sedation:    Informed consent was given. Patient was placed on the cardiac monitor with nurse and respiratory therapist present.   End-tidal CO2 monitoring was completed. Continuous pulse oximetry was completed. She was placed on oxygen 2 L per nasal cannula. Procedural Sedation    ASA Classification: 1   Class 1: A normal healthy patient   Class 2: Pt with mild to moderate systemic disease   Class 3: Severe systemic disease or disturbance   Class 4: Severe systemic disorders that are already life threatening   Class 5: Moribund pt with little chances of survival, for more than 24 hours  Mallampati I Airway Classification:   1       The patient is an appropriate candidate to undergo the planned procedure sedation and anesthesia. (Refer to nursing sedation/analgesia documentation record)  Formulation and discussion of sedation/procedure plan, risks, and expectations with patient and/or responsible adult completed. Patient examined immediately prior to the procedure. (Refer to nursing sedation/analgesia documentation record)    Pre-procedure diagnostic studies complete and results available. (Y/N) yes  Previous sedation/anesthesia experiences assessed. (Y/N) yes  Time out performed immediately prior to procedure. (Y/N) yes      POST-PROCEDURE COMMENTS    Sedative agents used (ie, Propofol, Etomidate, Versed): Versed 3 mg IV  Analgesic agents used (ie, Fentanyl, Morphine, Dilaudid) : Morphine 4 mg IV  Adjunctive agents used (ie, Atropine, Ketamine): Zofran 4 mg IV  Physicians/Assistants: MELANIE Guzman PA-C  Procedure Performed: Closed reduction left wrist, laceration repair of forearm laceration, application of left volar splint        Complications: None      Recommendations: Follow-up with orthopedics as an outpatient               FINAL IMPRESSION      1. Closed fracture of distal ends of left radius and ulna, initial encounter    2. Laceration of arm, left, initial encounter    3.  Fall on same level from slipping, tripping or stumbling, initial encounter          DISPOSITION/PLAN   DISPOSITION Decision To Discharge 08/07/2020 00:45:30 PM      PATIENT REFERRED TO:  Mark Hubbard MD  [address] [facility]  [address] [place] [address]  [phone]    Schedule an appointment as soon as possible for a visit       Herman Lund MD  [address] [facility]. [place] [address]  [phone]    Schedule an appointment as soon as possible for a visit       [facility]  [address] [address]  [phone]  Go to   As needed      DISCHARGE MEDICATIONS:  New Prescriptions    CEPHALEXIN (KEFLEX) 500 MG CAPSULE    Take 1 capsule by mouth 2 times daily for 5 days    HYDROCODONE-ACETAMINOPHEN (NORCO) 5-325 MG PER TABLET    Take 1 tablet by mouth every 6 hours as needed for Pain for up to 5 days. Controlled Substances Monitoring:     No flowsheet data found. (Please note that portions of this note were completed with a voice recognition program.  Efforts were made to edit the dictations but occasionally words are mis-transcribed. )    1859 Mickey Dorsey DO (electronically signed)  Attending Emergency Physician      Sultana Jean Baptiste DO  08/07/20 0821

## 2020-08-08 NOTE — ED PROVIDER NOTES
[address] [facility]        Pt Name: Maximilian George  MRN: [id number]  [place] 1934  Date of evaluation: 8/7/2020  Provider: DAMI Leach - CNP  PCP: Ron Ibarra MD     I have seen and evaluated this patient with my supervising physician Moriah Lopez [address]       Chief Complaint   Patient presents with    Fall     walking too fast per pt and she slipped on the grass left wrist deformity        HISTORY OF PRESENT ILLNESS   (Location, Timing/Onset, Context/Setting, Quality, Duration, Modifying Factors, Severity, Associated Signs and Symptoms)  Note limiting factors. Maximilian George is a 80 y.o. female who presents to the emergency department today via EMS from home with left wrist deformity. Onset was just prior to arrival.  The context was that patient was walking in the grass and she slipped falling forward onto an outstretched hand. She denies hitting her head or losing consciousness. She is alert and oriented to person place and year. Tetanus status up-to-date. Nursing Notes were all reviewed and agreed with or any disagreements were addressed in the HPI. REVIEW OF SYSTEMS    (2-9 systems for level 4, 10 or more for level 5)     Review of Systems   Constitutional: Negative for diaphoresis. HENT: Negative for facial swelling and nosebleeds. Eyes: Negative for visual disturbance. Respiratory: Negative for shortness of breath. Cardiovascular: Negative for chest pain. Gastrointestinal: Negative for abdominal pain, nausea and vomiting. Musculoskeletal: Positive for arthralgias and joint swelling (left wrist). Negative for back pain, neck pain and neck stiffness. Skin: Positive for wound (left arm). Negative for color change. Allergic/Immunologic: Negative for immunocompromised state. Neurological: Negative for dizziness, syncope, weakness, numbness and headaches. Psychiatric/Behavioral: Negative for confusion. All other systems reviewed and are negative. Positives and Pertinent negatives as per HPI. Except as noted above in the ROS, all other systems were reviewed and negative. PAST MEDICAL HISTORY     Past Medical History:   Diagnosis Date    Allergic rhinitis     GERD (gastroesophageal reflux disease)     Hyperlipidemia     Laceration of head 06/23/2016         SURGICAL HISTORY     Past Surgical History:   Procedure Laterality Date    APPENDECTOMY      COLONOSCOPY  4-2011    FRACTURE SURGERY  8/2010    left Ulna    KNEE SURGERY      left torn meniscus         CURRENTMEDICATIONS       Previous Medications    ASPIRIN 325 MG TABLET    Take 325 mg by mouth    OMEPRAZOLE (PRILOSEC PO)    Take by mouth         ALLERGIES     Levofloxacin    FAMILYHISTORY       Family History   Problem Relation Age of Onset    Arthritis Mother     Heart Attack Father           SOCIAL HISTORY       Social History     Tobacco Use    Smoking status: Never Smoker    Smokeless tobacco: Former User   Substance Use Topics    Alcohol use: No    Drug use: No       SCREENINGS             PHYSICAL EXAM    (up to 7 for level 4, 8 or more for level 5)     ED Triage Vitals [08/07/20 1903]   BP Temp Temp Source Pulse Resp SpO2 Height Weight   (!) 166/69 98.2 °F (36.8 °C) Oral 78 18 98 % 5' 9\" (1.753 m) 146 lb 9.7 oz (66.5 kg)       Physical Exam  Vitals signs and nursing note reviewed. Constitutional:       General: She is not in acute distress. Appearance: Normal appearance. She is well-developed. She is not toxic-appearing. HENT:      Head: Normocephalic and atraumatic. Right Ear: Tympanic membrane and ear canal normal.      Left Ear: Tympanic membrane and ear canal normal.      Nose: Nose normal. No nasal tenderness. Eyes:      General: No scleral icterus. Extraocular Movements: Extraocular movements intact.       Conjunctiva/sclera: Conjunctivae normal. Pupils: Pupils are equal, round, and reactive to light. Neck:      Musculoskeletal: Full passive range of motion without pain and neck supple. No spinous process tenderness or muscular tenderness. Vascular: No JVD. Trachea: No tracheal deviation. Cardiovascular:      Rate and Rhythm: Normal rate and regular rhythm. Heart sounds: Normal heart sounds. Pulmonary:      Effort: Pulmonary effort is normal. No respiratory distress. Breath sounds: Normal breath sounds. Abdominal:      General: There is no distension. Palpations: Abdomen is soft. Abdomen is not rigid. Tenderness: There is no abdominal tenderness. Musculoskeletal:      Left wrist: She exhibits decreased range of motion, tenderness, swelling and deformity. She exhibits no laceration. Left forearm: She exhibits laceration. Arms:       Comments: Deformity to left wrist.  X-ray shows ulnar radial fracture. It is displaced anteriorly. She has a laceration/skin tear on the dorsal aspect of the left forearm that is not convincing for open fracture. Wound was vigorously scrubbed and it was very superficial.  6 sutures were placed as it was not a straightforward laceration as pictured below indicates. Distal extremity is pink and well-perfused. Capillary refill <2 seconds. Sensation is intact. Left radial pulses bounding. She is able to move all fingers and ulnar, radial, and medial nerves are intact. Sedation was performed and I was able to reduce the fracture to get improved alignment. It is not perfect. Patient remained neurovascularly intact. Consulted with orthopedic surgery who advised they will call tomorrow to schedule an appointment. Patient was given Ancef in the emergency department and will be discharged home with Keflex despite the very low clinical suspicion that this is an open fracture. Skin:     General: Skin is warm and dry.       Capillary Refill: Capillary refill takes less than 2 seconds. Neurological:      General: No focal deficit present. Mental Status: She is alert and oriented to person, place, and time. Cranial Nerves: Cranial nerves are intact. Sensory: Sensation is intact. Motor: Motor function is intact. Psychiatric:         Mood and Affect: Mood normal.             DIAGNOSTIC RESULTS   LABS:    Labs Reviewed - No data to display    All other labs were within normal range or not returned as of this dictation. EKG: All EKG's are interpreted by the Emergency Department Physician in the absence of a cardiologist.  Please see their note for interpretation of EKG. RADIOLOGY:   Non-plain film images such as CT, Ultrasound and MRI are read by the radiologist. Plain radiographic images are visualized and preliminarily interpreted by the ED Provider with the below findings:        Interpretation per the Radiologist below, if available at the time of this note:    XR WRIST LEFT (2 VIEWS)   Final Result   Interval close reduction of distal radius and ulnar fractures. XR WRIST LEFT (MIN 3 VIEWS)   Final Result   Distal radial and ulnar fractures with significant displacement and   angulation of distal fracture fragments. Xr Wrist Left (2 Views)    Result Date: 8/7/2020  EXAMINATION: 2 XRAY VIEWS OF THE LEFT WRIST 8/7/2020 8:44 pm COMPARISON: Radiographs earlier today at 1931 hours HISTORY: [facility]: post reduction TECHNOLOGIST PROVIDED HISTORY: Reason for exam:->post reduction Reason for Exam: post reduction Acuity: Acute Type of Exam: Initial Mechanism of Injury: post reduction FINDINGS:. Plate and screws of the distal ulna are again seen. Bones visualized appear demineralized and show degenerative changes. Again identified are comminuted fractures of the distal left radius and ulna as well as the ulnar styloid. There has been interval closed reduction of the fracture fragment displacement.   There remains posterior displacement of the distal radial fracture by 1 bone width. Interval close reduction of distal radius and ulnar fractures. Xr Wrist Left (min 3 Views)    Result Date: 8/7/2020  EXAMINATION: 4 XRAY VIEWS OF THE LEFT WRIST 8/7/2020 7:09 pm COMPARISON: None. HISTORY: ORDERING SYSTEM PROVIDED HISTORY: fall TECHNOLOGIST PROVIDED HISTORY: Reason for exam:->fall Reason for Exam: fall Acuity: Acute Type of Exam: Initial Mechanism of Injury: fall FINDINGS: Osteopenia. Prominent soft tissue swelling about the distal forearm and wrist.  Comminuted fractures involving the distal radial and ulnar metaphysis. Distal fracture fragments are displaced and angulated posteriorly positioned just distal to the distal radial and ulnar diaphysis. There also displaced ulnar direction of the wrist..     Distal radial and ulnar fractures with significant displacement and angulation of distal fracture fragments. PROCEDURES   Unless otherwise noted below, none     Lac Repair    Date/Time: 8/7/2020 9:50 PM  Performed by: DAMI Whitmore CNP  Authorized by: DAMI Whitmore CNP     Consent:     Consent obtained:  Verbal    Consent given by:  Patient    Risks discussed:  Infection, need for additional repair, nerve damage, pain, poor cosmetic result, poor wound healing, vascular damage, tendon damage and retained foreign body  Laceration details:     Location:  Shoulder/arm    Shoulder/arm location:  L lower arm    Length (cm):  4  Repair type:     Repair type:  Simple  Pre-procedure details:     Preparation:  Patient was prepped and draped in usual sterile fashion and imaging obtained to evaluate for foreign bodies  Exploration:     Hemostasis achieved with:  Direct pressure    Wound exploration: wound explored through full range of motion and entire depth of wound probed and visualized      Contaminated: no    Treatment:     Wound cleansed with: Chlorhexidine.     Amount of cleaning:  Extensive  Skin repair: Repair method:  Sutures    Suture size:  5-0    Suture material:  Nylon    Suture technique:  Simple interrupted    Number of sutures:  6  Approximation:     Approximation:  Close  Post-procedure details:     Dressing:  Sterile dressing and non-adherent dressing    Patient tolerance of procedure: Tolerated well, no immediate complications  Ortho Injury    Date/Time: 8/7/2020 9:52 PM  Performed by: DAMI Bonds CNP  Authorized by: DAMI Bonds CNP   Consent: Verbal consent obtained. Written consent obtained. Risks and benefits: risks, benefits and alternatives were discussed  Consent given by: patient  Patient understanding: patient states understanding of the procedure being performed  Patient consent: the patient's understanding of the procedure matches consent given  Relevant documents: relevant documents present and verified  Imaging studies: imaging studies available  Required items: required blood products, implants, devices, and special equipment available  Patient identity confirmed: verbally with patient and arm band  Time out: Immediately prior to procedure a \"time out\" was called to verify the correct patient, procedure, equipment, support staff and site/side marked as required.   Injury location: wrist  Location details: left wrist  Injury type: fracture-dislocation  Pre-procedure neurovascular assessment: neurovascularly intact  Pre-procedure distal perfusion: normal  Pre-procedure neurological function: normal  Pre-procedure range of motion: reduced    Anesthesia:  Local anesthesia used: no    Sedation:  Patient sedated: yes (See Dr. Susan Lewis sedation note)    Manipulation performed: yes  Skeletal traction used: yes  Reduction successful: yes (improved)  X-ray confirmed reduction: yes  Immobilization: splint and sling  Splint type: sugar tong  Supplies used: Ortho-Glass  Post-procedure neurovascular assessment: post-procedure neurovascularly intact  Post-procedure distal perfect. She remained neurovascular intact though. Sugar tong splint placed by [facility]. Consulted with orthopedic surgery, Dr. Silvio Payne, advised he would call her tomorrow to schedule an appointment for surgery. She was discharged home with family in stable condition. At this time, the evidence for any other entities in the differential is insufficient to justify any further testing. This was explained to the patient. The patient was advised that persistent or worsening symptoms will require further evaluation. The patient tolerated their visit well. They were seen and evaluated by the attending physician, Ellen Lopez DO who agreed with the assessment and plan. The patient and / or the family were informed of the results of any tests, a time was given to answer questions, a plan was proposed and they agreed with plan. FINAL IMPRESSION      1. Closed fracture of distal ends of left radius and ulna, initial encounter    2. Laceration of arm, left, initial encounter    3. Fall on same level from slipping, tripping or stumbling, initial encounter          DISPOSITION/PLAN   DISPOSITION Decision To Discharge 08/07/2020 09:28:18 PM      PATIENT REFERREDTO:  Lukas Gooden MD  [address] [address]  [phone]    Schedule an appointment as soon as possible for a visit       Afsaneh Hall MD  [address] [facility]. [place] [address] [phone]    Schedule an appointment as soon as possible for a visit       [facility]  [address] [address]  [phone]  Go to   As needed      DISCHARGE MEDICATIONS:  New Prescriptions    CEPHALEXIN (KEFLEX) 500 MG CAPSULE    Take 1 capsule by mouth 2 times daily for 5 days    HYDROCODONE-ACETAMINOPHEN (NORCO) 5-325 MG PER TABLET    Take 1 tablet by mouth every 6 hours as needed for Pain for up to 5 days.        DISCONTINUED MEDICATIONS:  Discontinued Medications ATORVASTATIN (LIPITOR) 40 MG TABLET    TAKE 1 TABLET BY MOUTH ONE TIME A DAY     CELECOXIB (CELEBREX) 200 MG CAPSULE    Take 1 capsule by mouth daily              (Please note that portions of this note were completed with a voice recognition program.  Efforts were made to edit the dictations but occasionally words are mis-transcribed.)    DAMI Sharp CNP (electronically signed)           DAMI Sharp CNP  08/07/20 5202

## 2020-08-08 NOTE — ED NOTES
Bed: -34  Expected date:   Expected time:   Means of arrival:   Comments:  Cricket Chavarria RN  08/07/20 2016

## 2020-08-08 NOTE — ED NOTES
Anthony DOCKERY reduced wrist at this time. Xray will be called for study.       Js Lujan RN  08/07/20 0046

## 2020-08-10 ENCOUNTER — ANESTHESIA EVENT (OUTPATIENT)
Dept: OPERATING ROOM | Age: 85
End: 2020-08-10
Payer: MEDICARE

## 2020-08-10 ENCOUNTER — APPOINTMENT (OUTPATIENT)
Dept: GENERAL RADIOLOGY | Age: 85
End: 2020-08-10
Attending: ORTHOPAEDIC SURGERY
Payer: MEDICARE

## 2020-08-10 ENCOUNTER — HOSPITAL ENCOUNTER (OUTPATIENT)
Age: 85
Setting detail: OUTPATIENT SURGERY
Discharge: HOME | End: 2020-08-10
Attending: ORTHOPAEDIC SURGERY | Admitting: ORTHOPAEDIC SURGERY
Payer: MEDICARE

## 2020-08-10 ENCOUNTER — ANESTHESIA (OUTPATIENT)
Dept: OPERATING ROOM | Age: 85
End: 2020-08-10
Payer: MEDICARE

## 2020-08-10 VITALS
SYSTOLIC BLOOD PRESSURE: 122 MMHG | DIASTOLIC BLOOD PRESSURE: 59 MMHG | RESPIRATION RATE: 4 BRPM | OXYGEN SATURATION: 99 %

## 2020-08-10 VITALS
SYSTOLIC BLOOD PRESSURE: 156 MMHG | OXYGEN SATURATION: 94 % | WEIGHT: 145 LBS | RESPIRATION RATE: 16 BRPM | TEMPERATURE: 97 F | BODY MASS INDEX: 21.98 KG/M2 | HEIGHT: 68 IN | HEART RATE: 81 BPM | DIASTOLIC BLOOD PRESSURE: 87 MMHG

## 2020-08-10 LAB — SARS-COV-2, NAAT: NOT DETECTED

## 2020-08-10 PROCEDURE — 7100000000 HC PACU RECOVERY - FIRST 15 MIN: Performed by: ORTHOPAEDIC SURGERY

## 2020-08-10 PROCEDURE — 7100000011 HC PHASE II RECOVERY - ADDTL 15 MIN: Performed by: ORTHOPAEDIC SURGERY

## 2020-08-10 PROCEDURE — 3700000001 HC ADD 15 MINUTES (ANESTHESIA): Performed by: ORTHOPAEDIC SURGERY

## 2020-08-10 PROCEDURE — 2500000003 HC RX 250 WO HCPCS: Performed by: ORTHOPAEDIC SURGERY

## 2020-08-10 PROCEDURE — 3600000014 HC SURGERY LEVEL 4 ADDTL 15MIN: Performed by: ORTHOPAEDIC SURGERY

## 2020-08-10 PROCEDURE — 2580000003 HC RX 258: Performed by: NURSE ANESTHETIST, CERTIFIED REGISTERED

## 2020-08-10 PROCEDURE — 6360000002 HC RX W HCPCS: Performed by: NURSE ANESTHETIST, CERTIFIED REGISTERED

## 2020-08-10 PROCEDURE — 7100000010 HC PHASE II RECOVERY - FIRST 15 MIN: Performed by: ORTHOPAEDIC SURGERY

## 2020-08-10 PROCEDURE — 3600000004 HC SURGERY LEVEL 4 BASE: Performed by: ORTHOPAEDIC SURGERY

## 2020-08-10 PROCEDURE — 99204 OFFICE O/P NEW MOD 45 MIN: CPT | Performed by: ORTHOPAEDIC SURGERY

## 2020-08-10 PROCEDURE — 3700000000 HC ANESTHESIA ATTENDED CARE: Performed by: ORTHOPAEDIC SURGERY

## 2020-08-10 PROCEDURE — 73100 X-RAY EXAM OF WRIST: CPT

## 2020-08-10 PROCEDURE — 2500000003 HC RX 250 WO HCPCS: Performed by: NURSE ANESTHETIST, CERTIFIED REGISTERED

## 2020-08-10 PROCEDURE — U0002 COVID-19 LAB TEST NON-CDC: HCPCS

## 2020-08-10 PROCEDURE — 2580000003 HC RX 258: Performed by: ORTHOPAEDIC SURGERY

## 2020-08-10 PROCEDURE — 6360000002 HC RX W HCPCS: Performed by: ORTHOPAEDIC SURGERY

## 2020-08-10 PROCEDURE — 3209999900 FLUORO FOR SURGICAL PROCEDURES

## 2020-08-10 PROCEDURE — C1713 ANCHOR/SCREW BN/BN,TIS/BN: HCPCS | Performed by: ORTHOPAEDIC SURGERY

## 2020-08-10 PROCEDURE — 2720000010 HC SURG SUPPLY STERILE: Performed by: ORTHOPAEDIC SURGERY

## 2020-08-10 PROCEDURE — 2709999900 HC NON-CHARGEABLE SUPPLY: Performed by: ORTHOPAEDIC SURGERY

## 2020-08-10 PROCEDURE — 25608 OPTX DST RD XART FX/EPI SEP2: CPT | Performed by: ORTHOPAEDIC SURGERY

## 2020-08-10 PROCEDURE — 7100000001 HC PACU RECOVERY - ADDTL 15 MIN: Performed by: ORTHOPAEDIC SURGERY

## 2020-08-10 DEVICE — VOLAR DR PLATE INTERM. LEFT EXTRASHORT
Type: IMPLANTABLE DEVICE | Site: WRIST | Status: FUNCTIONAL
Brand: VARIAX

## 2020-08-10 DEVICE — LOCKING SCREW, FULLY THREADED,T8
Type: IMPLANTABLE DEVICE | Site: WRIST | Status: FUNCTIONAL
Brand: VARIAX

## 2020-08-10 DEVICE — BONE SCREW, FULLY THREADED, T8
Type: IMPLANTABLE DEVICE | Site: WRIST | Status: FUNCTIONAL
Brand: VARIAX

## 2020-08-10 RX ORDER — PROMETHAZINE HYDROCHLORIDE 25 MG/ML
6.25 INJECTION, SOLUTION INTRAMUSCULAR; INTRAVENOUS
Status: DISCONTINUED | OUTPATIENT
Start: 2020-08-10 | End: 2020-08-10 | Stop reason: HOSPADM

## 2020-08-10 RX ORDER — SODIUM CHLORIDE 0.9 % (FLUSH) 0.9 %
10 SYRINGE (ML) INJECTION PRN
Status: DISCONTINUED | OUTPATIENT
Start: 2020-08-10 | End: 2020-08-10 | Stop reason: HOSPADM

## 2020-08-10 RX ORDER — FENTANYL CITRATE 50 UG/ML
INJECTION, SOLUTION INTRAMUSCULAR; INTRAVENOUS PRN
Status: DISCONTINUED | OUTPATIENT
Start: 2020-08-10 | End: 2020-08-10 | Stop reason: SDUPTHER

## 2020-08-10 RX ORDER — GLYCOPYRROLATE 0.2 MG/ML
INJECTION INTRAMUSCULAR; INTRAVENOUS PRN
Status: DISCONTINUED | OUTPATIENT
Start: 2020-08-10 | End: 2020-08-10 | Stop reason: SDUPTHER

## 2020-08-10 RX ORDER — FENTANYL CITRATE 50 UG/ML
25 INJECTION, SOLUTION INTRAMUSCULAR; INTRAVENOUS EVERY 5 MIN PRN
Status: DISCONTINUED | OUTPATIENT
Start: 2020-08-10 | End: 2020-08-10 | Stop reason: HOSPADM

## 2020-08-10 RX ORDER — DEXAMETHASONE SODIUM PHOSPHATE 4 MG/ML
INJECTION, SOLUTION INTRA-ARTICULAR; INTRALESIONAL; INTRAMUSCULAR; INTRAVENOUS; SOFT TISSUE PRN
Status: DISCONTINUED | OUTPATIENT
Start: 2020-08-10 | End: 2020-08-10 | Stop reason: SDUPTHER

## 2020-08-10 RX ORDER — PROPOFOL 10 MG/ML
INJECTION, EMULSION INTRAVENOUS PRN
Status: DISCONTINUED | OUTPATIENT
Start: 2020-08-10 | End: 2020-08-10 | Stop reason: SDUPTHER

## 2020-08-10 RX ORDER — SODIUM CHLORIDE 9 MG/ML
INJECTION, SOLUTION INTRAVENOUS CONTINUOUS
Status: DISCONTINUED | OUTPATIENT
Start: 2020-08-10 | End: 2020-08-10 | Stop reason: HOSPADM

## 2020-08-10 RX ORDER — KETOROLAC TROMETHAMINE 30 MG/ML
INJECTION, SOLUTION INTRAMUSCULAR; INTRAVENOUS PRN
Status: DISCONTINUED | OUTPATIENT
Start: 2020-08-10 | End: 2020-08-10 | Stop reason: SDUPTHER

## 2020-08-10 RX ORDER — BUPIVACAINE HYDROCHLORIDE 5 MG/ML
INJECTION, SOLUTION EPIDURAL; INTRACAUDAL
Status: COMPLETED | OUTPATIENT
Start: 2020-08-10 | End: 2020-08-10

## 2020-08-10 RX ORDER — ONDANSETRON 2 MG/ML
INJECTION INTRAMUSCULAR; INTRAVENOUS PRN
Status: DISCONTINUED | OUTPATIENT
Start: 2020-08-10 | End: 2020-08-10 | Stop reason: SDUPTHER

## 2020-08-10 RX ORDER — LABETALOL HYDROCHLORIDE 5 MG/ML
5 INJECTION, SOLUTION INTRAVENOUS EVERY 10 MIN PRN
Status: DISCONTINUED | OUTPATIENT
Start: 2020-08-10 | End: 2020-08-10 | Stop reason: HOSPADM

## 2020-08-10 RX ORDER — LIDOCAINE HYDROCHLORIDE 20 MG/ML
INJECTION, SOLUTION INFILTRATION; PERINEURAL PRN
Status: DISCONTINUED | OUTPATIENT
Start: 2020-08-10 | End: 2020-08-10 | Stop reason: SDUPTHER

## 2020-08-10 RX ORDER — SODIUM CHLORIDE 0.9 % (FLUSH) 0.9 %
10 SYRINGE (ML) INJECTION EVERY 12 HOURS SCHEDULED
Status: DISCONTINUED | OUTPATIENT
Start: 2020-08-10 | End: 2020-08-10 | Stop reason: HOSPADM

## 2020-08-10 RX ORDER — SODIUM CHLORIDE 9 MG/ML
INJECTION, SOLUTION INTRAVENOUS CONTINUOUS PRN
Status: DISCONTINUED | OUTPATIENT
Start: 2020-08-10 | End: 2020-08-10 | Stop reason: SDUPTHER

## 2020-08-10 RX ADMIN — PROPOFOL 125 MG: 10 INJECTION, EMULSION INTRAVENOUS at 12:12

## 2020-08-10 RX ADMIN — FENTANYL CITRATE 25 MCG: 50 INJECTION INTRAMUSCULAR; INTRAVENOUS at 12:29

## 2020-08-10 RX ADMIN — GLYCOPYRROLATE 0.1 MG: 0.2 INJECTION, SOLUTION INTRAMUSCULAR; INTRAVENOUS at 12:08

## 2020-08-10 RX ADMIN — SODIUM CHLORIDE: 9 INJECTION, SOLUTION INTRAVENOUS at 12:08

## 2020-08-10 RX ADMIN — DEXAMETHASONE SODIUM PHOSPHATE 6 MG: 4 INJECTION, SOLUTION INTRAMUSCULAR; INTRAVENOUS at 12:15

## 2020-08-10 RX ADMIN — HYDROMORPHONE HYDROCHLORIDE 0.5 MG: 1 INJECTION, SOLUTION INTRAMUSCULAR; INTRAVENOUS; SUBCUTANEOUS at 12:48

## 2020-08-10 RX ADMIN — FENTANYL CITRATE 25 MCG: 50 INJECTION INTRAMUSCULAR; INTRAVENOUS at 12:08

## 2020-08-10 RX ADMIN — ONDANSETRON 4 MG: 2 INJECTION INTRAMUSCULAR; INTRAVENOUS at 12:48

## 2020-08-10 RX ADMIN — FENTANYL CITRATE 25 MCG: 50 INJECTION INTRAMUSCULAR; INTRAVENOUS at 12:12

## 2020-08-10 RX ADMIN — CEFAZOLIN SODIUM 2 G: 10 INJECTION, POWDER, FOR SOLUTION INTRAVENOUS at 12:08

## 2020-08-10 RX ADMIN — LIDOCAINE HYDROCHLORIDE 4 ML: 20 INJECTION, SOLUTION INFILTRATION; PERINEURAL at 12:12

## 2020-08-10 RX ADMIN — FENTANYL CITRATE 25 MCG: 50 INJECTION INTRAMUSCULAR; INTRAVENOUS at 12:27

## 2020-08-10 RX ADMIN — KETOROLAC TROMETHAMINE 15 MG: 30 INJECTION, SOLUTION INTRAMUSCULAR at 12:15

## 2020-08-10 ASSESSMENT — PULMONARY FUNCTION TESTS
PIF_VALUE: 10
PIF_VALUE: 9
PIF_VALUE: 9
PIF_VALUE: 13
PIF_VALUE: 9
PIF_VALUE: 14
PIF_VALUE: 10
PIF_VALUE: 3
PIF_VALUE: 9
PIF_VALUE: 9
PIF_VALUE: 1
PIF_VALUE: 9
PIF_VALUE: 5
PIF_VALUE: 9
PIF_VALUE: 3
PIF_VALUE: 11
PIF_VALUE: 9
PIF_VALUE: 4
PIF_VALUE: 9
PIF_VALUE: 5
PIF_VALUE: 9
PIF_VALUE: 0
PIF_VALUE: 10
PIF_VALUE: 0
PIF_VALUE: 9
PIF_VALUE: 10
PIF_VALUE: 9
PIF_VALUE: 1
PIF_VALUE: 9
PIF_VALUE: 3
PIF_VALUE: 9
PIF_VALUE: 10
PIF_VALUE: 1
PIF_VALUE: 5
PIF_VALUE: 9
PIF_VALUE: 5
PIF_VALUE: 10
PIF_VALUE: 4
PIF_VALUE: 3
PIF_VALUE: 9
PIF_VALUE: 10
PIF_VALUE: 4
PIF_VALUE: 9
PIF_VALUE: 9
PIF_VALUE: 10

## 2020-08-10 ASSESSMENT — PAIN - FUNCTIONAL ASSESSMENT: PAIN_FUNCTIONAL_ASSESSMENT: 0-10

## 2020-08-10 ASSESSMENT — PAIN SCALES - GENERAL
PAINLEVEL_OUTOF10: 0

## 2020-08-10 NOTE — ANESTHESIA PRE PROCEDURE
[facility] Department of Anesthesiology  Pre-Anesthesia Evaluation/Consultation       Name:  Kaye Batres  : 1934  Age:  80 y.o. MRN:  [id number]  Date: 8/10/2020           Surgeon: Surgeon(s):  Jesus Coulter MD    Procedure: Procedure(s):  OPEN REDUCTION INTERNAL FIXATION LEFT DISTAL RADIUS     Allergies   Allergen Reactions    Levofloxacin Nausea Only     Patient Active Problem List   Diagnosis    Hyperlipidemia    GERD (gastroesophageal reflux disease)     Past Medical History:   Diagnosis Date    Allergic rhinitis     GERD (gastroesophageal reflux disease)     Hyperlipidemia     Laceration of head 2016     Past Surgical History:   Procedure Laterality Date    APPENDECTOMY      COLONOSCOPY      FRACTURE SURGERY  2010    left Ulna    KNEE SURGERY      left torn meniscus     Social History     Tobacco Use    Smoking status: Never Smoker    Smokeless tobacco: Former User   Substance Use Topics    Alcohol use: No    Drug use: No     Medications  No current facility-administered medications on file prior to encounter. Current Outpatient Medications on File Prior to Encounter   Medication Sig Dispense Refill    cephALEXin (KEFLEX) 500 MG capsule Take 1 capsule by mouth 2 times daily for 5 days 10 capsule 0    aspirin 325 MG tablet Take 325 mg by mouth      HYDROcodone-acetaminophen (NORCO) 5-325 MG per tablet Take 1 tablet by mouth every 6 hours as needed for Pain for up to 5 days.  15 tablet 0    Omeprazole (PRILOSEC PO) Take by mouth       Current Facility-Administered Medications   Medication Dose Route Frequency Provider Last Rate Last Dose    ceFAZolin (ANCEF) 2 g in dextrose 5 % 100 mL IVPB  2 g Intravenous Once Jesus Coulter MD         Vital Signs (Current)   Vitals:    08/10/20 0921   BP: (!) 173/74   Pulse: 67   Resp: 16   Temp: 98.2 °F (36.8 °C)   SpO2: 96%     Vital Signs Statistics (for past 48 hrs)     Temp Av.2 °F (36.8 °C)  Min: 98.2 °F (36.8 °C)   Min taken time: 08/10/20 0921  Max: 98.2 °F (36.8 °C)   Max taken time: 08/10/20 0921  Pulse  Av  Min: 79   Min taken time: 08/10/20 0921  Max: 79   Max taken time: 08/10/20 0921  Resp  Av  Min: 12   Min taken time: 08/10/20 0921  Max: 16   Max taken time: 08/10/20 0921  BP  Min: 173/74   Min taken time: 08/10/20 0921  Max: 173/74   Max taken time: 08/10/20 0921  SpO2  Av %  Min: 96 %   Min taken time: 08/10/20 0921  Max: 96 %   Max taken time: 08/10/20 0921    BP Readings from Last 3 Encounters:   08/10/20 (!) 173/74   20 135/83   20 (!) 144/80     BMI  Body mass index is 22.05 kg/m². Estimated body mass index is 22.05 kg/m² as calculated from the following:    Height as of this encounter: 5' 8\" (1.727 m). Weight as of this encounter: 145 lb (65.8 kg). CBC   Lab Results   Component Value Date    WBC 4.6 2018    RBC 4.13 2018    HGB 13.0 2018    HCT 39.8 2018    MCV 96.4 2018    RDW 13.9 2018     2018     CMP    Lab Results   Component Value Date     2018    K 4.6 2018     2018    CO2 28 2018    BUN 16 2018    CREATININE 1.1 2018    GFRAA 57 2018    GFRAA 56 2013    AGRATIO 2.2 2018    LABGLOM 47 2018    GLUCOSE 87 2018    PROT 6.4 2018    PROT 6.7 2012    CALCIUM 9.4 2018    BILITOT 0.4 2018    ALKPHOS 48 2018    AST 16 2018    ALT 9 2018     BMP    Lab Results   Component Value Date     2018    K 4.6 2018     2018    CO2 28 2018    BUN 16 2018    CREATININE 1.1 2018    CALCIUM 9.4 2018    GFRAA 57 2018    GFRAA 56 2013    LABGLOM 47 2018    GLUCOSE 87 2018     POCGlucose  No results for input(s): GLUCOSE in the last 72 hours.    Coags    Lab Results   Component Value Date    APTT 25.7 53/94/4507     HCG (If Applicable) No results found for: PREGTESTUR, PREGSERUM, HCG, HCGQUANT   ABGs No results found for: PHART, PO2ART, MZO1AUH, TIG9JCV, BEART, D0WEMKZZ   Type & Screen (If Applicable)  No results found for: LABABO, LABRH                         BMI: Wt Readings from Last 3 Encounters:       NPO Status:   Date of last liquid consumption: 08/09/20   Time of last liquid consumption: 1730   Date of last solid food consumption: 08/09/20      Time of last solid consumption: 1800       Anesthesia Evaluation  Patient summary reviewed no history of anesthetic complications:   Airway: Mallampati: III  TM distance: >3 FB   Neck ROM: full   Dental:    (+) partials      Pulmonary:Negative Pulmonary ROS and normal exam                               Cardiovascular:  Exercise tolerance: good (>4 METS),           Rhythm: regular  Rate: normal           Beta Blocker:  Not on Beta Blocker         Neuro/Psych:   Negative Neuro/Psych ROS              GI/Hepatic/Renal:   (+) GERD:,           Endo/Other: Negative Endo/Other ROS                    Abdominal:           Vascular: negative vascular ROS. Anesthesia Plan      general     ASA 3       Induction: intravenous. MIPS: Postoperative opioids intended and Prophylactic antiemetics administered. Anesthetic plan and risks discussed with patient. Plan discussed with CRNA. This pre-anesthesia assessment may be used as a history and physical.    DOS STAFF ADDENDUM:    Pt seen and examined, chart reviewed (including anesthesia, drug and allergy history). No interval changes to history and physical examination. Anesthetic plan, risks, benefits, alternatives, and personnel involved discussed with patient. Questions and concerns addressed. Patient(family) verbalized an understanding and agrees to proceed.       Katie Jackson MD  August 10, 2020  10:25 AM

## 2020-08-10 NOTE — ANESTHESIA POSTPROCEDURE EVALUATION
[facility] Department of Anesthesiology  Post-Anesthesia Note       Name:  Didier Dyer                                  Age:  80 y.o. MRN:  [id number]     Last Vitals & Oxygen Saturation: BP (!) 156/87   Pulse 81   Temp 97 °F (36.1 °C) (Temporal)   Resp 16   Ht 5' 8\" (1.727 m)   Wt 145 lb (65.8 kg)   SpO2 94%   BMI 22.05 kg/m²   Patient Vitals for the past 4 hrs:   BP Temp Temp src Pulse Resp SpO2   08/10/20 1449 (!) 156/87 -- -- 81 16 94 %   08/10/20 1351 (!) 158/73 -- -- 76 16 98 %   08/10/20 1325 (!) 170/97 97 °F (36.1 °C) Temporal 79 16 96 %   08/10/20 1320 (!) 178/97 -- -- 76 14 97 %   08/10/20 1315 (!) 158/99 -- -- 84 26 97 %   08/10/20 1312 (!) 155/94 -- -- 84 17 97 %   08/10/20 1311 (!) 142/85 -- -- 85 15 97 %   08/10/20 1310 -- -- -- 79 11 97 %   08/10/20 1309 (!) 142/85 -- -- 79 11 97 %   08/10/20 1308 -- -- -- -- -- 98 %   08/10/20 1306 -- 96.9 °F (36.1 °C) Temporal -- -- --       Level of consciousness:  Awake, alert    Respiratory: Respirations easy, no distress. Stable. Cardiovascular: Hemodynamically stable. Hydration: Adequate. PONV: Adequately managed. Post-op pain: Adequately controlled. Post-op assessment: Tolerated anesthetic well without complication. Complications:  None.     Tiffany Baca MD  August 10, 2020   4:12 PM

## 2020-08-10 NOTE — CONSULTS
[id number] [facility] Orthopedic Surgery  Consult Note        This patient is seen in consultation at the request of DAMI Cerda CNP    Reason for Consult:  Left wrist pain/ markedly displaced distal radius fracture. CHIEF COMPLAINT:  Left wrist pain/ fall. History Obtained From:  patient, electronic medical record    HISTORY OF PRESENT ILLNESS:    Ms. Dequan Luna is a 80 y.o.  female right handed who presents today for evaluation of a left wrist injury. The patient reports that this injury occurred when she fell at home when she was walking in the grass and she slipped falling forward onto an outstretched hand. She was first seen and evaluated in [address] [place] [address], came via EMS, when she was x-rayed and splinted, and I was consulted. The patient denies any other injuries. Rates pain a 8/10 VAS moderate, sharp, intermittent and show no change. Movement makes the pain worse, the splint and resting makes the pain better. Alleviating factors elevation and rest. No numbness or tingling sensation. Past Medical History:        Diagnosis Date    Allergic rhinitis     GERD (gastroesophageal reflux disease)     Hyperlipidemia     Laceration of head 06/23/2016       Past Surgical History:        Procedure Laterality Date    APPENDECTOMY      COLONOSCOPY  4-2011    FOREARM SURGERY Left 8/10/2020    OPEN REDUCTION INTERNAL FIXATION LEFT DISTAL RADIUS with mini c-arm performed by Lillian Pastor MD at [address] [address]  8/2010    left Ulna    KNEE SURGERY      left torn meniscus       Medications prior to admission:   Prior to Admission medications    Medication Sig Start Date End Date Taking?  Authorizing Provider   cephALEXin (KEFLEX) 500 MG capsule Take 1 capsule by mouth 2 times daily for 5 days 8/7/20 8/12/20 Yes DAMI Cerda CNP   aspirin 325 MG tablet Take 325 mg by mouth   Yes Historical Provider, MD   HYDROcodone-acetaminophen (NORCO) 5-325 MG per tablet Take 1 tablet by mouth every 6 hours as needed for Pain for up to 5 days. 8/7/20 8/12/20  DAMI Streeter - CNP   Omeprazole (PRILOSEC PO) Take by mouth    Historical Provider, MD       Current Medications:   No current facility-administered medications for this encounter.      Allergies:  Levofloxacin    Social History     Socioeconomic History    Marital status:      Spouse name: Not on file    Number of children: Not on file    Years of education: Not on file    Highest education level: Not on file   Occupational History    Not on file   Social Needs    Financial resource strain: Not on file    Food insecurity     Worry: Not on file     Inability: Not on file    Transportation needs     Medical: Not on file     Non-medical: Not on file   Tobacco Use    Smoking status: Never Smoker    Smokeless tobacco: Former User   Substance and Sexual Activity    Alcohol use: No    Drug use: No    Sexual activity: Not on file   Lifestyle    Physical activity     Days per week: Not on file     Minutes per session: Not on file    Stress: Not on file   Relationships    Social connections     Talks on phone: Not on file     Gets together: Not on file     Attends [employer] service: Not on file     Active member of club or organization: Not on file     Attends meetings of clubs or organizations: Not on file     Relationship status: Not on file    Intimate partner violence     Fear of current or ex partner: Not on file     Emotionally abused: Not on file     Physically abused: Not on file     Forced sexual activity: Not on file   Other Topics Concern    Not on file   Social History Narrative    Not on file       Family History:  Family History   Problem Relation Age of Onset    Arthritis Mother     Heart Attack Father          REVIEW OF SYSTEMS:   CONSTITUTIONAL: Denies unexplained weight loss, fevers, chills or fatigue  NEUROLOGICAL: Denies unsteady gait or progressive weakness    PSYCHOLOGICAL: Denies anxiety, depression   SKIN: Denies skin changes, delayed healing, rash, itching   HEMATOLOGIC: Denies easy bleeding or bruising  ENDOCRINE: Denies excessive thirst, urination, heat/cold  RESPIRATORY: Denies current dyspnea, cough  CARDIOVASCULAR: Negative for chest pain at this time. EYES: Negative for photophobia and visual disturbance. ENT:  Negative for rhinorrhea, epistaxis, sore throat, or hearing loss. GI: Denies nausea, vomiting, diarrhea   : Denies bowel or bladder issues   MUSCULOSKELETAL: Left wrist pain  All other ROS reviewed in chart or with patient or family and are grossly negative. PHYSICAL EXAMINATION:  Ms. Kirsten Orantes is a very pleasant 80 y.o. female who seen today in no acute distress, awake, alert, and oriented. She is well nourished and groomed. Patient with normal affect. Body mass index is 22.05 kg/m². . Skin warm and dry. Resting respiratory rate is 16. Resp deep and easy. Pulse is with regular rate and rhythm    BP (!) 173/74   Pulse 67   Temp 98.2 °F (36.8 °C) (Oral)   Resp 16   Ht 5' 8\" (1.727 m)   Wt 145 lb (65.8 kg)   SpO2 96%   BMI 22.05 kg/m²        Airway is intact  Chest: chest clear, no wheezing, rales, normal symmetric air entry, no tachypnea, retractions or cyanosis  Heart: regular rate and rhythm ; heart sounds normal   Hearing intact, pupil equal and reactive bilateral  Lymphatics; No groin or axillary enlarged lymph nodes. Neck; No swelling  Abdomen; soft, non distended. MUSCULOSKELETAL:   On evaluation of her left upper extremity, there is moderate deformity. There is moderate swelling and moderate ecchymosis. She is tender to palpation over the distal radius, and otherwise nontender over the remainder of the extremity. Range of motion is decreased secondary to pain over the left wrist, but no mechanical block. The skin overlying the left wrist with dorsal sutured laceration or abrasion. Distal pulses are 2+ and symmetric bilaterally.   Sensation is grossly intact to light touch

## 2020-08-10 NOTE — PROGRESS NOTES
md arebi called to bedside to give update to family. Pt/visitor given d/c instructions. Verb understanding. Denies questions.

## 2020-08-10 NOTE — H&P
Preoperative H&P Update    The patient's History and Physical in the medical record from 8/10/2020 was reviewed by me today. Past Medical History:   Diagnosis Date    Allergic rhinitis     GERD (gastroesophageal reflux disease)     Hyperlipidemia     Laceration of head 06/23/2016     Past Surgical History:   Procedure Laterality Date    APPENDECTOMY      COLONOSCOPY  4-2011    FRACTURE SURGERY  8/2010    left Ulna    KNEE SURGERY      left torn meniscus     No current facility-administered medications on file prior to encounter. Current Outpatient Medications on File Prior to Encounter   Medication Sig Dispense Refill    cephALEXin (KEFLEX) 500 MG capsule Take 1 capsule by mouth 2 times daily for 5 days 10 capsule 0    aspirin 325 MG tablet Take 325 mg by mouth      HYDROcodone-acetaminophen (NORCO) 5-325 MG per tablet Take 1 tablet by mouth every 6 hours as needed for Pain for up to 5 days. 15 tablet 0    Omeprazole (PRILOSEC PO) Take by mouth         Allergies   Allergen Reactions    Levofloxacin Nausea Only      I reviewed the HPI, medications, allergies, reason for surgery, diagnosis and treatment plan and there has been no change. The patient was evaluated by me today. Physical exam findings for this update include:    Vitals:    08/10/20 0921   BP: (!) 173/74   Pulse: 67   Resp: 16   Temp: 98.2 °F (36.8 °C)   SpO2: 96%     Airway is intact  Chest: chest clear, no wheezing, rales, normal symmetric air entry, no tachypnea, retractions or cyanosis  Heart: regular rate and rhythm ; heart sounds normal  Findings on exam of the body region where surgery is to be performed include:  Left distal radius fracture.     Electronically signed by Antione Perez on 8/10/2020 at 11:18 AM

## 2020-08-11 NOTE — BRIEF OP NOTE
Brief Postoperative Note      Patient: Jd Saleem  YOB: 1934  MRN: [id number]    Date of Procedure: 8/10/2020    Pre-Op Diagnosis:  CLOSED FRACTURE OF DISTAL RADIUS    Post-Op Diagnosis: Same       Procedure(s):  OPEN REDUCTION INTERNAL FIXATION LEFT DISTAL RADIUS with mini c-arm    Surgeon(s):  Lillian Pastor MD    Assistant:  Surgical Assistant: Madison Montes    Anesthesia: General    Estimated Blood Loss (mL): Minimal    Complications: None    Specimens:   * No specimens in log *    Implants:  Implant Name Type Inv. Item Serial No.  Lot No. LRB No. Used Action   PLATE VOLAR  INTRMED LT 10H XSHRT Screw/Plate/Nail/Jignesh PLATE VOLAR  INTRMED LT 10H XSHRT  SENDY: ORTH[employer]  Left 1 Implanted   SCREW LOCKING 2. [id number] Screw/Plate/Nail/Jignesh SCREW LOCKING 2. [id number]  SENDY: ORTHOPAEDICS  Left 1 Implanted   SCREW LOCKING 2. [id number] Screw/Plate/Nail/Jignesh SCREW LOCKING 2. [id number]  SENDY: ORTHOPAEDICS  Left 1 Implanted   SCREW LOCKING [id number] Screw/Plate/Nail/Jignesh SCREW LOCKING [id number]  SEDNY: ORTHOPAEDICS  Left 4 Implanted   SCREW LOCKING 2.1NRW20CK Screw/Plate/Nail/Jignesh SCREW LOCKING [id number]  SENDY: ORTHOPAEDICS  Left 2 Implanted   SCREW T8 BONE 2. [id number] Screw/Plate/Nail/Jignesh SCREW T8 BONE 2. [id number]  SENDY: ORTHOPAEDICS  Left 1 Implanted         Drains: * No LDAs found *    Findings: Same    Electronically signed by Lillian Pastor MD on 8/10/2020 at 8:14 PM

## 2020-08-11 NOTE — OP NOTE
[address] [place] [address]                                OPERATIVE REPORT    PATIENT NAME: Kobi Newman                     :        1934  MED REC NO:   [id number]                          ROOM:  ACCOUNT NO:   [de-identified]                           ADMIT DATE: 08/10/2020  PROVIDER:     Mariela Palacios MD    DATE OF PROCEDURE:  08/10/2020    PRIMARY CARE PHYSICIAN:  Petr Montoya MD    PREOPERATIVE DIAGNOSIS:  Left distal radius comminuted two-part  intra-articular displaced fracture. POSTOPERATIVE DIAGNOSIS:  Left distal radius comminuted two-part  intra-articular displaced fracture. OPERATION PERFORMED:  Open treatment of left distal radius two-part  intra-articular fracture with open reduction and internal fixation. SURGEON:  MD Emelina Cuevas Surgical Assistant    ANESTHESIA:  General anesthesia. ESTIMATED BLOOD LOSS:  Minimal.    COMPLICATIONS:  None. TOURNIQUET:  Left upper arm, 250 mmHg. IMPLANTS USED:  Dolph titanium intermediate volar locking plate with a  total of seven distal 2.7 locking screws and two proximal screws. INDICATIONS:  This is an 42-year-old white female, right-hand dominant,  who is still fairly active, who lives at home, sustained a fall with a  left wrist injury. She was found to have a severely comminuted  displaced distal radius fracture. All risks, benefits, and alternatives  were discussed with the patient. She agreed to proceed with the  surgical treatment. OPERATIVE PROCEDURE:  The patient's left wrist was marked. She received  2 gm of Ancef IV preoperatively. The patient was then brought to the  operating room and underwent general anesthesia. A well-padded  tourniquet was placed, left upper arm. The left upper extremity was  then prepped and draped in regular sterile routine fashion.   A time-out  was called, confirming the patient's French Whittington MD

## 2020-08-17 ENCOUNTER — OFFICE VISIT (OUTPATIENT)
Dept: FAMILY MEDICINE CLINIC | Age: 85
End: 2020-08-17
Payer: MEDICARE

## 2020-08-17 VITALS
OXYGEN SATURATION: 97 % | DIASTOLIC BLOOD PRESSURE: 76 MMHG | TEMPERATURE: 97.2 F | HEART RATE: 81 BPM | SYSTOLIC BLOOD PRESSURE: 132 MMHG

## 2020-08-17 PROCEDURE — G8420 CALC BMI NORM PARAMETERS: HCPCS | Performed by: INTERNAL MEDICINE

## 2020-08-17 PROCEDURE — 1036F TOBACCO NON-USER: CPT | Performed by: INTERNAL MEDICINE

## 2020-08-17 PROCEDURE — 1090F PRES/ABSN URINE INCON ASSESS: CPT | Performed by: INTERNAL MEDICINE

## 2020-08-17 PROCEDURE — 4040F PNEUMOC VAC/ADMIN/RCVD: CPT | Performed by: INTERNAL MEDICINE

## 2020-08-17 PROCEDURE — 1123F ACP DISCUSS/DSCN MKR DOCD: CPT | Performed by: INTERNAL MEDICINE

## 2020-08-17 PROCEDURE — G8399 PT W/DXA RESULTS DOCUMENT: HCPCS | Performed by: INTERNAL MEDICINE

## 2020-08-17 PROCEDURE — 99214 OFFICE O/P EST MOD 30 MIN: CPT | Performed by: INTERNAL MEDICINE

## 2020-08-17 PROCEDURE — G8427 DOCREV CUR MEDS BY ELIG CLIN: HCPCS | Performed by: INTERNAL MEDICINE

## 2020-08-17 ASSESSMENT — ENCOUNTER SYMPTOMS: RESPIRATORY NEGATIVE: 1

## 2020-08-17 ASSESSMENT — PATIENT HEALTH QUESTIONNAIRE - PHQ9: DEPRESSION UNABLE TO ASSESS: PT REFUSES

## 2020-08-17 NOTE — PROGRESS NOTES
Subjective:      Patient ID: Julinene Patterson is a 80 y.o. female. Patient presents with:  Referral - General: referral for PT, for walking. also fell last week, had surgery on left arm fracture. Medication Request: should she be on cholesterol meds? she took lipitor for a while but it gave her backache. Julienne Patterson is a 80 y.o. female with the following history as recorded in [facility]:  Patient Active Problem List    Closed fracture of left distal radius      GERD (gastroesophageal reflux disease)      Hyperlipidemia      Current Outpatient Medications:  aspirin 325 MG tablet, Take 325 mg by mouth, Disp: , Rfl:   Omeprazole (PRILOSEC PO), Take by mouth, Disp: , Rfl:     No current facility-administered medications for this visit. Allergies: Levofloxacin  Past Medical History:  No date: Allergic rhinitis  No date: GERD (gastroesophageal reflux disease)  No date: Hyperlipidemia  06/23/2016: Laceration of head  Past Surgical History:  No date: APPENDECTOMY  4-2011: COLONOSCOPY  8/10/2020: FOREARM SURGERY; Left      Comment:  OPEN REDUCTION INTERNAL FIXATION LEFT DISTAL RADIUS with               mini c-arm performed by Keyur Flowers MD at [place] [address]  8/2010: FRACTURE SURGERY      Comment:  left Ulna  No date: KNEE SURGERY      Comment:  left torn meniscus  Review of patient's family history indicates:  Problem: Arthritis      Relation: Mother          Age of Onset: (Not Specified)  Problem: Heart Attack      Relation: Father          Age of Onset: (Not Specified)    Social History    Tobacco Use      Smoking status: Never Smoker      Smokeless tobacco: Former User    Alcohol use: No      Fall   The accident occurred more than 1 week ago. The fall occurred while walking. She landed on grass. The point of impact was the left wrist. The pain is present in the left upper arm. The pain is moderate. She has tried immobilization (open reduction.) for the symptoms. The treatment provided significant relief. Review of Systems   Constitutional: Positive for activity change and fatigue. HENT: Negative. Respiratory: Negative. Genitourinary: Negative. Musculoskeletal: Positive for gait problem (weak leg muscles and loss of balance.) and myalgias. Lt lower arm fracture. Skin: Positive for wound. Neurological: Positive for light-headedness. Negative for dizziness and tremors. Psychiatric/Behavioral: The patient is not nervous/anxious. Objective:   Physical Exam  Constitutional:       General: She is not in acute distress. Appearance: She is well-developed. She is not diaphoretic. HENT:      Head: Normocephalic and atraumatic. Right Ear: External ear normal.      Left Ear: External ear normal.      Nose: Nose normal.      Mouth/Throat:      Pharynx: No oropharyngeal exudate. Eyes:      General: No scleral icterus. Right eye: No discharge. Left eye: No discharge. Conjunctiva/sclera: Conjunctivae normal.      Pupils: Pupils are equal, round, and reactive to light. Neck:      Musculoskeletal: Normal range of motion and neck supple. Thyroid: No thyromegaly. Vascular: No JVD. Trachea: No tracheal deviation. Cardiovascular:      Rate and Rhythm: Normal rate and regular rhythm. Heart sounds: Normal heart sounds. No murmur. No friction rub. No gallop. Pulmonary:      Effort: Pulmonary effort is normal. No respiratory distress. Breath sounds: Normal breath sounds. No stridor. No wheezing or rales. Chest:      Chest wall: No tenderness. Abdominal:      General: There is no distension. Palpations: Abdomen is soft. There is no mass. Tenderness: There is no abdominal tenderness. There is no guarding or rebound. Genitourinary:     Vagina: Normal. No vaginal discharge. Rectum: Guaiac result negative. Musculoskeletal: Normal range of motion. General: No tenderness. Comments:  Lt wrist cast after open reduction. Lymphadenopathy:      Cervical: No cervical adenopathy. Skin:     General: Skin is warm and dry. Coloration: Skin is not pale. Findings: No erythema or rash. Neurological:      Mental Status: She is alert and oriented to person, place, and time. Cranial Nerves: No cranial nerve deficit. Motor: No abnormal muscle tone. Coordination: Coordination normal.      Deep Tendon Reflexes: Reflexes are normal and symmetric. Reflexes normal.   Psychiatric:         Behavior: Behavior normal.         Thought Content: Thought content normal.         Judgment: Judgment normal.         Assessment:      Encounter Diagnoses   Name Primary?  Loss of balance Yes    Other closed fracture of distal end of left radius, sequela     Fall, sequela            Plan:      Marleen Villela was seen today for referral - general and medication request.    Diagnoses and all orders for this visit:    Loss of balance  -     Cancel: IA HOME VISIT NEW PATIENT MOD SEVERITY 30 MINUTES  -     Cancel: IA HOME VISIT NEW PATIENT MOD SEVERITY 30 MINUTES  -     External Referral To Home Health    Other closed fracture of distal end of left radius, sequela  -     External Referral To Home Health    Fall, sequela      Frequent fall with loss of balance and weakness of lower extremity.         Chey Figueroa MD

## 2020-08-24 ENCOUNTER — TELEPHONE (OUTPATIENT)
Dept: FAMILY MEDICINE CLINIC | Age: 85
End: 2020-08-24

## 2020-08-24 NOTE — TELEPHONE ENCOUNTER
Valeria Score with [id number] [facility] home care calling to get orders for patient .       Please call, [phone]

## 2020-08-25 ENCOUNTER — OFFICE VISIT (OUTPATIENT)
Dept: ORTHOPEDIC SURGERY | Age: 85
End: 2020-08-25
Payer: MEDICARE

## 2020-08-25 ENCOUNTER — TELEPHONE (OUTPATIENT)
Dept: FAMILY MEDICINE CLINIC | Age: 85
End: 2020-08-25

## 2020-08-25 VITALS — HEIGHT: 68 IN | WEIGHT: 145 LBS | BODY MASS INDEX: 21.98 KG/M2 | TEMPERATURE: 98.2 F

## 2020-08-25 PROCEDURE — 99024 POSTOP FOLLOW-UP VISIT: CPT | Performed by: NURSE PRACTITIONER

## 2020-08-25 PROCEDURE — L3908 WHO COCK-UP NONMOLDE PRE OTS: HCPCS | Performed by: ORTHOPAEDIC SURGERY

## 2020-08-25 NOTE — PROGRESS NOTES
DIAGNOSIS:  Left distal radius 2 parts intra-articular displaced comminuted fracture, status post ORIF. DATE OF SURGERY:  8/10/2020. HISTORY OF PRESENT ILLNESS:  Ms. Dequan Luna 80 y.o.  female right handed returns today  for 2 weeks postoperative visit. The patient denies any significant pain in the left wrist. Rates pain a 0-1/10 VAS mild, aching, intermittent and improving. Pain is worse with movement and better with rest and elevation. No numbness or tingling sensation. No fever or Chills. She  is in a splint. PHYSICAL EXAMINATION:  The incision is completely healed . The sutures on the dorsal wrist laceration were removed. No signs of any erythema or drainage. She  has no pain with the active or passive range of motion of the left wrist, but decrease ROM. She  has intact sensation, distally, and she  is neurovascularly intact. IMAGING:  Three views left wrist taken today in the office showed anatomic alignment of left distal radius, plate and screws in good position, no loosening. IMPRESSION:  2 weeks out from left distal radius ORIF and doing very well. PLAN:  I have told the patient to work on ROM, as well as strengthening exercises. A removable forearm brace applied. No heavy impact activities. The patient will come back for a follow up in 6 weeks. At that time, we will take 3 views of the left wrist. PT if needed then. As this patient has demonstrated risk factors for osteoporosis, such as age and evidence of a fracture, I have referred the patient back to the primary care physician for evaluation for osteoporosis, including consideration for DEXA scanning, if this is felt to be clinically indicated. The patient is advised to contact the primary care physician to follow-up for further evaluation. Procedures    Anna Odonnell Titan Wrist Long Forearm Brace     Patient was prescribed a Anna Odonnell Titan Wrist and Forearm Brace.    The left wrist will require stabilization / immobilization from this semi-rigid / rigid orthosis to improve their function. The orthosis will assist in protecting the affected area, provide functional support and facilitate healing. The patient was educated and fit by a healthcare professional with expert knowledge and specialization in brace application while under the direct supervision of the treating physician. Verbal and written instructions for the use of and application of this item were provided. They were instructed to contact the office immediately should the brace result in increased pain, decreased sensation, increased swelling or worsening of the condition.        Ksenia Carter, APRN - CNP

## 2020-08-26 ENCOUNTER — TELEPHONE (OUTPATIENT)
Dept: ORTHOPEDIC SURGERY | Age: 85
End: 2020-08-26

## 2020-08-26 NOTE — TELEPHONE ENCOUNTER
Called and spoke to [place], gave verbal. Informed pop nothing more than a coffee mug should be lifted.  Juni Oquendo in agreement to plan

## 2020-09-09 ENCOUNTER — TELEPHONE (OUTPATIENT)
Dept: FAMILY MEDICINE CLINIC | Age: 85
End: 2020-09-09

## 2020-09-09 NOTE — TELEPHONE ENCOUNTER
[facility] is requesting a verbal order   For home OT 2 times a week for 2 weeks and 1 time a week for 2 weeks     Pl advise [facility]   [phone]

## 2020-09-17 ENCOUNTER — TELEPHONE (OUTPATIENT)
Dept: FAMILY MEDICINE CLINIC | Age: 85
End: 2020-09-17

## 2020-09-17 NOTE — TELEPHONE ENCOUNTER
Kiana Johnson with [facility] calling to let you know patient is being discharged from home care today. She will need out patient physical therapy for her left wrist fracture. Patient wishes to to to [facility] physical therapy in [place]. Please call patient back when referral is finished.     Please advise, [phone]

## 2020-10-06 ENCOUNTER — OFFICE VISIT (OUTPATIENT)
Dept: ORTHOPEDIC SURGERY | Age: 85
End: 2020-10-06

## 2020-10-06 VITALS — WEIGHT: 145 LBS | BODY MASS INDEX: 21.98 KG/M2 | TEMPERATURE: 97.2 F | HEIGHT: 68 IN

## 2020-10-06 PROCEDURE — APPNB15 APP NON BILLABLE TIME 0-15 MINS: Performed by: NURSE PRACTITIONER

## 2020-10-06 PROCEDURE — 99024 POSTOP FOLLOW-UP VISIT: CPT | Performed by: NURSE PRACTITIONER

## 2020-10-06 NOTE — PROGRESS NOTES
DIAGNOSIS:  Left distal radius 2 parts intra-articular displaced comminuted fracture, status post ORIF. DATE OF SURGERY:  8/10/2020. HISTORY OF PRESENT ILLNESS:  Ms. Elmo Campos 80 y.o.  female right handed returns today  for  weeks postoperative visit. The patient denies any significant pain in the left wrist. Rates pain a 0/10 VAS mild, aching, intermittent and improving. Pain is worse with movement and better with rest and elevation. No numbness or tingling sensation. No fever or Chills. She  is in a brace. PHYSICAL EXAMINATION:  The incision is completely healed . The wrist laceration has completely healed. No signs of any erythema or drainage. She  has no pain with the active or passive range of motion of the left wrist, but decrease ROM. She  has intact sensation, distally, and she  is neurovascularly intact. IMAGING:  Three views left wrist taken today in the office showed anatomic alignment of left distal radius, plate and screws in good position, no loosening. There is an ulnar plate and screws from prior injury. There is a  distal ulnar fracture, distal from the hardware, with callus. IMPRESSION:  6 weeks out from left distal radius ORIF and doing very well. PLAN:  I have told the patient to work on ROM, as well as strengthening exercises. I discussed with the patient that I think that she would really benefit from a course of physical therapy for further strengthening and stretching. An Rx for physical therapy was given to the patient. She can discontinue the forearm brace. No heavy impact activities. The patient will come back for a follow up in 6 weeks.   At that time, we will take 3 views of the left wrist.     As this patient has demonstrated risk factors for osteoporosis, such as age and evidence of a fracture, I have referred the patient back to the primary care physician for evaluation for osteoporosis, including consideration for DEXA scanning, if this is felt to be clinically indicated. The patient is advised to contact the primary care physician to follow-up for further evaluation.          Cheikh Navarrete, APRN - CNP

## 2020-10-28 ENCOUNTER — HOSPITAL ENCOUNTER (OUTPATIENT)
Dept: PHYSICAL THERAPY | Age: 85
Setting detail: THERAPIES SERIES
Discharge: HOME | End: 2020-10-28
Payer: MEDICARE

## 2020-10-28 PROCEDURE — 97161 PT EVAL LOW COMPLEX 20 MIN: CPT

## 2020-10-28 PROCEDURE — 97110 THERAPEUTIC EXERCISES: CPT

## 2020-10-28 NOTE — FLOWSHEET NOTE
Physical Therapy Daily Treatment Note  Date:  10/28/2020    Patient Name:  George Funes    :  1934  MRN: [id number]    Restrictions/Precautions:  Restrictions/Precautions  Restrictions/Precautions: Fall Risk(minimal)  Pertinent Medical History:      Medical/Treatment Diagnosis Information:  · Diagnosis: Other closed fracture of distal end of radius, initial encounter (M50.960Z)  · Treatment Diagnosis: Decreased ADL status    Insurance/Certification information:  PT Insurance Information: Medicare  Physician Information:  Referring Practitioner: Dr. Chey Adame of care signed (Y/N):  Sent to in basket on 10/28/20    Visit# / total visits:    Pain level: /10     Functional Outcomes Measure:  Test:  Aury Eaton  Score: 10/28/20 54.5% dysfunction    Progress Note: []  Yes  []  No  Next due by: Visit #10      History of Injury:    Subjective:       Objective:   Observation:    Test measurements:     L wrist AROM  10/28/20    Flexion  60    extension  18 (PROM 21)    RD  10 (PROM 11)    UD  25    Pronation  50    Supination  75    Shoulder flexion  130    L wrist strength      Flexion  4+/5    Extension  4+/5    RD  4/5    UD  4/5    Pronators  4+/5    Supinators  4/5    Biceps  4+/5    Triceps  4+/5    Shoulder flexion  4-/5    Shoulder ABD  4-/5    Finger flexion digits 2 and 3  4/5    Finger flexion digit 4  3+/5    Finger flexion digit 5  3-/5    Dynamometer (lbs)  L 2, R 60        Exercises:  Exercise/Equipment Resistance/Repetitions Other comments   PROM L wrist 10x    PROM L sup/pronation 10x    [place] from floor     Roll ball on table flexion     Roll ball on table hor add          Digiflex                                           Other Therapeutic Activities:      Home Exercise Program:    10/28/20  Pt brought her HEP which included use of theraputty (pink), flexion of DIPs and PIPs, making a fist, thumb/finger opposition, finger ABD/ADD, elbow flex/ext, wand press overhead    Manual Treatments: consider STM of forearm    Modalities:     Progression Towards Functional goals:  [] Patient is progressing as expected towards functional goals listed. [] Progression is slowed due to complexities listed. [] Progression has been slowed due to co-morbidities. [x] Plan just implemented, too soon to assess goals progression  [] Other:    Charges: Therapeutic Exercise:  [x] (36254) Provided verbal/tactile cueing for activities to restore or maintain strength, flexibility, endurance, ROM for improvements with self-care, mobility, lifting and ambulation. Neuromuscular Re-Education  [] (55734) Provided verbal/tactile cueing for activities to restore or maintain balance, coordination, kinesthetic sense, posture, motor skill, proprioception for self-care, mobility, lifting, and ambulation. Therapeutic Activities:    [] (09499) Provided verbal/tactile cueing to address functional limitations related to loss of mobility, strength, balance, and coordination.      Gait Training:  [] (21177) Provided training and instruction to the patient for proper postural muscle recruitment and positioning with ambulation re-education     Home Exercise Program:    [] (26934) Reviewed/Progressed HEP activities related to strengthening, flexibility, endurance, ROM for functional self-care, mobility, lifting and ambulation   [] (04564) Reviewed/Progressed HEP activities related to improving balance, coordination, kinesthetic sense, posture, motor skill, proprioception for self-care, mobility, lifting, and ambulation      Manual Treatments:  MFR / STM / Oscillations-Mobs:  G-I, II, III, IV / Manipulation / MLD  [] (14346) Provided manual therapy to mobilize  soft tissue/joints/fluid for the purpose of modulating pain, promoting relaxation, increasing ROM, reducing/eliminating soft tissue swelling/inflammation/restriction, improving soft tissue extensibility and allowing for

## 2020-10-28 NOTE — PROGRESS NOTES
Physical Therapy  Initial Assessment  Date: 10/28/2020  Patient Name: Socorro Connelly  MRN: [id number]  : 1934     Treatment Diagnosis: Decreased ADL status    Restrictions  Restrictions/Precautions  Restrictions/Precautions: Fall Risk(minimal)    Subjective   General  Chart Reviewed: Yes  Referring Practitioner: Dr. Apoorva Montalvo  Referral Date : 10/06/20  Diagnosis: Other closed fracture of distal end of radius, initial encounter (S52.592A)  PT Visit Information  Onset Date: 20  PT Insurance Information: Medicare  Total # of Visits Approved: 18  Total # of Visits to Date: 1  Subjective  Subjective: Pt broke her L forearm . Since the fracture pt has had limited hand use, stiff fingers and wrist, pain (3/10). Pt has pain sometimes at rest, sometimes with a change in arm positions. Pt stated she cannot /hold items with the L hand- \"I don't have anything in here to hold with it. \"  Pt had L \"arm surgery\" for the forearm- \"these 2 bones were real bad, they were sticking out\" and pointed to radius and ulna. Pt had home therapy for walking and for L UE use. Pt has been using her cane regularly. Pt stated she has numbness of the L 4th and 5th digits.      Social/Functional History  Social/Functional History  Lives With: Alone  ADL Assistance: Independent  Homemaking Assistance: Independent  Active : Yes  Occupation: Retired    Objective   Pt brought her HEP which included use of theraputty (pink), flexion of DIPs and PIPs, making a fist, thumb/finger opposition, finger ABD/ADD, elbow flex/ext, wand press overhead  AROM LUE (degrees)  L Elbow Flexion 0-145: WFL  L Elbow Extension 145-0: min limit  L Forearm Pron 0-90: 50  L Forearm Supination  0-90: 75  L Wrist Flexion 0-80: 60 (PROM 60)  L Wrist Extension 0-70: 18 (PROM 21)  L Wrist Radial Deviation 0-20: 10 (PROM 11)  L Wrist Ulnar Deviation 0-45: 25 (PROM 25)  Strength LUE  L Shoulder Flexion: 4-/5  L Shoulder ABduction: 4-/5  L Elbow Flexion: 4+/5  L Elbow Extension: 4+/5  L Forearm Pron: 4+/5  L Forearm Sup: 4/5  L Wrist Flexion: 4+/5  L Wrist Extension: 4+/5  L Wrist Radial Deviation: 4/5  L Wrist Ulnar Deviation: 4/5  Strength Other  Other: Dynamometer: L 2 pounds, R 60 pounds; finger flexion: digits 2 and 3 4/5, digit 4 3+/5, digit 5 3-/5 L hand: digit extension 4/5. Assessment   Conditions Requiring Skilled Therapeutic Intervention  Body structures, Functions, Activity limitations: Decreased ADL status; Decreased ROM; Decreased strength; Increased pain  Assessment: Pt presented to PT s/p wrist fracture. PLOF: independent and active  Treatment Diagnosis: Decreased ADL status  Prognosis: Good  REQUIRES PT FOLLOW UP: Yes  Activity Tolerance  Activity Tolerance: Patient limited by pain         Plan   Plan  Times per week: PT 1-3x/week for 4-6 weeks  Current Treatment Recommendations: Strengthening, ROM, Neuromuscular Re-education, Manual Therapy - Soft Tissue Mobilization, Manual Therapy - Joint Manipulation, Home Exercise Program, Modalities    OutComes Score  QuickDASH Total Score: 35 (10/28/20 1505)       QuickDASH Disability/Symptom Score : 54.55 % (10/28/20 1505)    Goals  Long term goals  Time Frame for Long term goals : 6 weeks  Long term goal 1: Pt will rate L forearm pain 0-2/10. Long term goal 2: Pt will be able to use L hand to button her shirt. Long term goal 3: Pt will be able to cut food. Long term goal 4: Pt will be independent with HEP.   Patient Goals   Patient goals : \"I want to be able to use my hand\" including with eating, cutting, getting dressed (using buttons)       Therapy Time   Individual Concurrent Group Co-treatment   Time In           Time Out           Minutes                   Carlene Schwab, PT

## 2020-10-28 NOTE — PROGRESS NOTES
Outpatient Physical Therapy  [] [facility]    Phone: [phone]   Fax: [phone]   [] [facility]  Phone: [phone]              Fax: [phone]  [x] [facility]   Phone: [phone]   Fax: [phone]     To: Referring Practitioner: Dr. Kane Parsons      Patient: Rebekah Gandhi   : 1934   MRN: [id number]  Evaluation Date: 10/28/2020      Diagnosis Information:  · Diagnosis: Other closed fracture of distal end of radius, initial encounter (S52.444R)   · Treatment Diagnosis: Decreased ADL status     Physical Therapy Certification/Re-Certification Form  Dear Dr. Rickie Vasquez,  The following patient has been evaluated for physical therapy services and for therapy to continue, Medicare requires monthly physician review of the treatment plan. Please review the attached evaluation and/or summary of the patient's plan of care, and verify that you agree therapy should continue by signing the attached document and sending it back to our office. Plan of Care/Treatment to date:  [x] Therapeutic Exercise    [x] Modalities:  [] Therapeutic Activity     [] Ultrasound  [] Electrical Stimulation  [] Gait Training      [] Cervical Traction [] Lumbar Traction  [x] Neuromuscular Re-education    [] Cold/hotpack [] Iontophoresis   [x] Instruction in HEP     Other:  [x] Manual Therapy      []             [] Aquatic Therapy      []           ? Frequency/Duration:  # Days per week: [x] 1 day # Weeks: [] 1 week [] 5 weeks     [] 2 days? [] 2 weeks [x] 6 weeks     [x] 3 days   [] 3 weeks [] 7 weeks     [] 4 days   [x] 4 weeks [] 8 weeks    Rehab Potential: [] Excellent [x] Good [] Fair  [] Poor       Electronically signed by:  Markus Kaba PT      If you have any questions or concerns, please don't hesitate to call.   Thank you for your referral.      Physician Signature:________________________________Date:__________________  By signing above, therapists plan is approved by physician

## 2020-10-30 ENCOUNTER — HOSPITAL ENCOUNTER (OUTPATIENT)
Dept: PHYSICAL THERAPY | Age: 85
Setting detail: THERAPIES SERIES
Discharge: HOME | End: 2020-10-30
Payer: MEDICARE

## 2020-10-30 PROCEDURE — 97110 THERAPEUTIC EXERCISES: CPT

## 2020-10-30 PROCEDURE — 97140 MANUAL THERAPY 1/> REGIONS: CPT

## 2020-10-30 NOTE — FLOWSHEET NOTE
Physical Therapy Daily Treatment Note  Date:  10/30/2020    Patient Name:  Courtney Almanzar    :  1934  MRN: [id number]    Restrictions/Precautions:  Restrictions/Precautions  Restrictions/Precautions: Fall Risk(minimal)  Pertinent Medical History:      Medical/Treatment Diagnosis Information:  · Diagnosis: Other closed fracture of distal end of radius, initial encounter (N64.343T)  · Treatment Diagnosis: Decreased ADL status    Insurance/Certification information:  PT Insurance Information: Medicare  Physician Information:  Referring Practitioner: Dr. Judith Londono of care signed (Y/N):  Sent to in basket on 10/28/20    Visit# / total visits:    Pain level: /10     Functional Outcomes Measure:  Test:  Darby Hdez  Score: 10/28/20 54.5% dysfunction    Progress Note: []  Yes  []  No  Next due by: Visit #10      History of Injury:    Subjective:       Objective:   Observation:    Test measurements:     L wrist AROM  10/28/20    Flexion  60    extension  18 (PROM 21)    RD  10 (PROM 11)    UD  25    Pronation  50    Supination  75    Shoulder flexion  130    L wrist strength      Flexion  4+/5    Extension  4+/5    RD  4/5    UD  4/5    Pronators  4+/5    Supinators  4/5    Biceps  4+/5    Triceps  4+/5    Shoulder flexion  4-/5    Shoulder ABD  4-/5    Finger flexion digits 2 and 3  4/5    Finger flexion digit 4  3+/5    Finger flexion digit 5  3-/5    Dynamometer (lbs)  L 2, R 60        Exercises:  Exercise/Equipment Resistance/Repetitions Other comments   PROM L wrist 10x    PROM L sup/pronation 10x    [place] from floor 4-way x10 ea    Roll ball on table flexion x15    Roll ball on table hor add x15         Digiflex Red x10 all / x5 ind                                          Other Therapeutic Activities:      Home Exercise Program:    10/28/20  Pt brought her HEP which included use of theraputty (pink), flexion of DIPs and PIPs, making a fist, thumb/finger opposition, finger ABD/ADD, elbow flex/ext, wand press overhead    Manual Treatments: STM of forearm x10 min    Modalities:     Progression Towards Functional goals:  [] Patient is progressing as expected towards functional goals listed. [] Progression is slowed due to complexities listed. [] Progression has been slowed due to co-morbidities. [x] Plan just implemented, too soon to assess goals progression  [] Other:    Charges: Therapeutic Exercise:  [x] (21050) Provided verbal/tactile cueing for activities to restore or maintain strength, flexibility, endurance, ROM for improvements with self-care, mobility, lifting and ambulation. Neuromuscular Re-Education  [] (37674) Provided verbal/tactile cueing for activities to restore or maintain balance, coordination, kinesthetic sense, posture, motor skill, proprioception for self-care, mobility, lifting, and ambulation. Therapeutic Activities:    [] (14258) Provided verbal/tactile cueing to address functional limitations related to loss of mobility, strength, balance, and coordination.      Gait Training:  [] (03662) Provided training and instruction to the patient for proper postural muscle recruitment and positioning with ambulation re-education     Home Exercise Program:    [] (02691) Reviewed/Progressed HEP activities related to strengthening, flexibility, endurance, ROM for functional self-care, mobility, lifting and ambulation   [] (25382) Reviewed/Progressed HEP activities related to improving balance, coordination, kinesthetic sense, posture, motor skill, proprioception for self-care, mobility, lifting, and ambulation      Manual Treatments:  MFR / STM / Oscillations-Mobs:  G-I, II, III, IV / Manipulation / MLD  [] (23867) Provided manual therapy to mobilize  soft tissue/joints/fluid for the purpose of modulating pain, promoting relaxation, increasing ROM, reducing/eliminating soft tissue swelling/inflammation/restriction, improving soft tissue extensibility and allowing for proper ROM for normal function with self- care, mobility, lifting and ambulation. Timed Code Treatment Minutes: 40   Total Treatment Minutes: 40     [] EVAL (LOW) 66045   [] EVAL (MOD) 99127   [] EVAL (HIGH) 03982   [] RE-EVAL   [x] TE (58511) x   2  [] Aquatic (02505) x  [] NMR (56984)   x  [] Aquatic Group (78281) x  [x] Manual (54507) x    [] Ultrasound (38894) x  [] TA (53043) x  [] Mech Traction (00987)  [] Ionto (25134)           [] ES (un) (62316):   [] Vasopump (23875) [] Other:      Assessment  [] Patient tolerated treatment well [] Patient limited by fatigue  [] Patient limited by pain  [] Patient limited by other medical complications  [] Other:     Prognosis: [] Good [] Fair  [] Poor    Goals:       Long term goals  Time Frame for Long term goals : 6 weeks  Long term goal 1: Pt will rate L forearm pain 0-2/10. Long term goal 2: Pt will be able to use L hand to button her shirt. Long term goal 3: Pt will be able to cut food. Long term goal 4: Pt will be independent with HEP. Patient Goals   Patient goals : \"I want to be able to use my hand\" including with eating, cutting, getting dressed (using buttons)              Patient Requires Follow-up: [] Yes  [] No    Plan:   [] Continue per plan of care [] Alter current plan (see comments)  [x] Plan of care initiated [] Hold pending MD visit [] Discharge  Note: If patient does not return for scheduled/ recommended follow up visits, this note will serve as a discharge from care along with most recent update on progress.     Plan for Next Session:      Electronically signed by:  Hernesto Yeager PTA

## 2020-11-03 ENCOUNTER — HOSPITAL ENCOUNTER (OUTPATIENT)
Dept: PHYSICAL THERAPY | Age: 85
Setting detail: THERAPIES SERIES
Discharge: HOME | End: 2020-11-03
Payer: MEDICARE

## 2020-11-03 PROCEDURE — 97110 THERAPEUTIC EXERCISES: CPT

## 2020-11-03 PROCEDURE — 97140 MANUAL THERAPY 1/> REGIONS: CPT

## 2020-11-03 NOTE — FLOWSHEET NOTE
Physical Therapy Daily Treatment Note  Date:  11/3/2020    Patient Name:  Joey Ziegler    :  1934  MRN: [id number]    Restrictions/Precautions:  Restrictions/Precautions  Restrictions/Precautions: Fall Risk(minimal)  Pertinent Medical History:      Medical/Treatment Diagnosis Information:  · Diagnosis: Other closed fracture of distal end of radius, initial encounter (S52.924X)  · Treatment Diagnosis: Decreased ADL status    Insurance/Certification information:  PT Insurance Information: Medicare  Physician Information:  Referring Practitioner: Dr. Marcia Mathew of care signed (Y/N):  Sent to in basket on 10/28/20    Visit# / total visits:  3/18  Pain level: /10     Functional Outcomes Measure:  Test:  Soila Watson  Score: 10/28/20 54.5% dysfunction    Progress Note: []  Yes  []  No  Next due by: Visit #10      History of Injury:Pt broke her L forearm . Since the fracture pt has had limited hand use, stiff fingers and wrist, pain (3/10). Pt has pain sometimes at rest, sometimes with a change in arm positions. Pt stated she cannot /hold items with the L hand- \"I don't have anything in here to hold with it. \"  Pt had L \"arm surgery\" for the forearm- \"these 2 bones were real bad, they were sticking out\" and pointed to radius and ulna. Pt had home therapy for walking and for L UE use. Pt has been using her cane regularly. Pt stated she has numbness of the L 4th and 5th digits.     Subjective:     11/3/20 doing no better, no worse    Objective:   Observation:    Test measurements:     L wrist AROM  10/28/20  11/3/20    Flexion  60      extension  18 (PROM 21)  30 (PROM 30)    RD  10 (PROM 11)  18 (PRON 20)    UD  25  30 (PROM 30)    Pronation  50  55    Supination  75  80    Shoulder flexion  130      L wrist strength        Flexion  4+/5      Extension  4+/5      RD  4/5      UD  4/5      Pronators  4+/5      Supinators  4/5      Biceps  4+/5      Triceps  4+/5      Shoulder flexion  4-/5      Shoulder ABD  4-/5      Finger flexion digits 2 and 3  4/5      Finger flexion digit 4  3+/5      Finger flexion digit 5  3-/5      Dynamometer (lbs)  L 2, R 60          Exercises:  Exercise/Equipment Resistance/Repetitions Other comments   PROM L wrist 10x    PROM L sup/pronation 10x    [place] from floor 4-way x10 ea    Roll ball on table flexion x30    Roll ball on table hor add x30         Digiflex Red 2x15 all / x5 ind         AAROM wrist  RD, EXT, Flex 10x each    AAROM forearm pron/supination 10x each    Isometric wrist  Flexion  RD  Flexion   10x  10x  10x    Dumbbell  Wrist extension  Wrist flexion  Biceps curls   1#, 10x  1#, 10x  1#, 10x                Other Therapeutic Activities:      Home Exercise Program:    10/28/20  Pt brought her HEP which included use of theraputty (pink), flexion of DIPs and PIPs, making a fist, thumb/finger opposition, finger ABD/ADD, elbow flex/ext, wand press overhead  11/3/20 AROM supination/pronation    Manual Treatments: mob wrist PA, distraction mob wrist, stretched wrist flexors 4x20\"    Modalities:     Progression Towards Functional goals:  [] Patient is progressing as expected towards functional goals listed. [] Progression is slowed due to complexities listed. [] Progression has been slowed due to co-morbidities. [x] Plan just implemented, too soon to assess goals progression  [] Other:    Charges: Therapeutic Exercise:  [x] (65431) Provided verbal/tactile cueing for activities to restore or maintain strength, flexibility, endurance, ROM for improvements with self-care, mobility, lifting and ambulation. Neuromuscular Re-Education  [] (47647) Provided verbal/tactile cueing for activities to restore or maintain balance, coordination, kinesthetic sense, posture, motor skill, proprioception for self-care, mobility, lifting, and ambulation.      Therapeutic Activities:    [] (82465) Provided verbal/tactile cueing to address functional limitations related to loss of mobility, strength, balance, and coordination. Gait Training:  [] (03141) Provided training and instruction to the patient for proper postural muscle recruitment and positioning with ambulation re-education     Home Exercise Program:    [] (50474) Reviewed/Progressed HEP activities related to strengthening, flexibility, endurance, ROM for functional self-care, mobility, lifting and ambulation   [] (77178) Reviewed/Progressed HEP activities related to improving balance, coordination, kinesthetic sense, posture, motor skill, proprioception for self-care, mobility, lifting, and ambulation      Manual Treatments:  MFR / STM / Oscillations-Mobs:  G-I, II, III, IV / Manipulation / MLD  [] (74371) Provided manual therapy to mobilize  soft tissue/joints/fluid for the purpose of modulating pain, promoting relaxation, increasing ROM, reducing/eliminating soft tissue swelling/inflammation/restriction, improving soft tissue extensibility and allowing for proper ROM for normal function with self- care, mobility, lifting and ambulation. Timed Code Treatment Minutes: 40   Total Treatment Minutes: 40     [] EVAL (LOW) 05970   [] EVAL (MOD) 43127   [] EVAL (HIGH) 10032   [] RE-EVAL   [x] TE (20983) x   2  [] Aquatic (10493) x  [] NMR (32429)   x  [] Aquatic Group (46522) x  [x] Manual (18731) x    [] Ultrasound (73598) x  [] TA (63864) x  [] Mech Traction (04869)  [] Ionto (50965)           [] ES (un) (21505):   [] Vasopump (84819) [] Other:      Assessment  [] Patient tolerated treatment well [] Patient limited by fatigue  [] Patient limited by pain  [] Patient limited by other medical complications  [] Other:     Prognosis: [] Good [] Fair  [] Poor    Goals:       Long term goals  Time Frame for Long term goals : 6 weeks  Long term goal 1: Pt will rate L forearm pain 0-2/10.   Long term goal 2: Pt will be able to use L hand to button her shirt.  Long term goal 3: Pt will be able to cut food. Long term goal 4: Pt will be independent with HEP. Patient Goals   Patient goals : \"I want to be able to use my hand\" including with eating, cutting, getting dressed (using buttons)              Patient Requires Follow-up: [] Yes  [] No    Plan:   [] Continue per plan of care [] Alter current plan (see comments)  [x] Plan of care initiated [] Hold pending MD visit [] Discharge  Note: If patient does not return for scheduled/ recommended follow up visits, this note will serve as a discharge from care along with most recent update on progress.     Plan for Next Session:  Begin for HEP wrist isometrics extension, flexion, RD    Electronically signed by:  Kristina Manrique PT

## 2020-11-05 ENCOUNTER — HOSPITAL ENCOUNTER (OUTPATIENT)
Dept: PHYSICAL THERAPY | Age: 85
Setting detail: THERAPIES SERIES
Discharge: HOME | End: 2020-11-05
Payer: MEDICARE

## 2020-11-05 PROCEDURE — 97110 THERAPEUTIC EXERCISES: CPT

## 2020-11-05 PROCEDURE — 97140 MANUAL THERAPY 1/> REGIONS: CPT

## 2020-11-05 NOTE — FLOWSHEET NOTE
Physical Therapy Daily Treatment Note  Date:  2020    Patient Name:  Maria Luisa Muñiz    :  1934  MRN: [id number]    Restrictions/Precautions:  Restrictions/Precautions  Restrictions/Precautions: Fall Risk(minimal)  Pertinent Medical History:      Medical/Treatment Diagnosis Information:  · Diagnosis: Other closed fracture of distal end of radius, initial encounter (S52.649P)  · Treatment Diagnosis: Decreased ADL status    Insurance/Certification information:  PT Insurance Information: Medicare  Physician Information:  Referring Practitioner: Dr. Melyssa Chaudhry of care signed (Y/N):  Sent to in basket on 10/28/20    Visit# / total visits:    Pain level: /10     Functional Outcomes Measure:  Test:  Rosina Starch  Score: 10/28/20 54.5% dysfunction    Progress Note: []  Yes  []  No  Next due by: Visit #10      History of Injury:Pt broke her L forearm . Since the fracture pt has had limited hand use, stiff fingers and wrist, pain (3/10). Pt has pain sometimes at rest, sometimes with a change in arm positions. Pt stated she cannot /hold items with the L hand- \"I don't have anything in here to hold with it. \"  Pt had L \"arm surgery\" for the forearm- \"these 2 bones were real bad, they were sticking out\" and pointed to radius and ulna. Pt had home therapy for walking and for L UE use. Pt has been using her cane regularly. Pt stated she has numbness of the L 4th and 5th digits.     Subjective:     11/3/20 doing no better, no worse    Objective:   Observation:    Test measurements:     L wrist AROM  10/28/20  11/3/20    Flexion  60      extension  18 (PROM 21)  30 (PROM 30)    RD  10 (PROM 11)  18 (PRON 20)    UD  25  30 (PROM 30)    Pronation  50  55    Supination  75  80    Shoulder flexion  130      L wrist strength        Flexion  4+/5      Extension  4+/5      RD  4/5      UD  4/5      Pronators  4+/5      Supinators  4/5      Biceps  4+/5      Triceps  4+/5      Shoulder flexion  4-/5      Shoulder ABD  4-/5      Finger flexion digits 2 and 3  4/5      Finger flexion digit 4  3+/5      Finger flexion digit 5  3-/5      Dynamometer (lbs)  L 2, R 60          Exercises:  Exercise/Equipment Resistance/Repetitions Other comments   PROM L wrist 10x    PROM L sup/pronation 10x    [place] from floor 4-way x10 ea    Roll ball on table flexion x30    Roll ball on table hor add x30         Digiflex Red 2x15 all / x5 ind         AAROM wrist  RD, EXT, Flex 10x each    AAROM forearm pron/supination 10x each    Isometric wrist  Flexion  RD  Flexion   10x  10x  10x    Dumbbell  Wrist extension  Wrist flexion  Wrist RD  Biceps curls- palm up  Biceps curls- palm down  Pron/supination   1#, 10x2  1#, 10x2  1#, 10x2  1#, 10x; 2# 10x  1#, 10x2  1#, 10x2                Other Therapeutic Activities:      Home Exercise Program:    10/28/20  Pt brought her HEP which included use of theraputty (pink), flexion of DIPs and PIPs, making a fist, thumb/finger opposition, finger ABD/ADD, elbow flex/ext, wand press overhead  11/3/20 AROM supination/pronation    Manual Treatments: mob wrist PA, distraction mob wrist, stretched wrist flexors 4x20\", STM wrist flexors    Modalities:     Progression Towards Functional goals:  [x] Patient is progressing as expected towards functional goals listed. [] Progression is slowed due to complexities listed. [] Progression has been slowed due to co-morbidities. [] Plan just implemented, too soon to assess goals progression  [] Other:    Charges: Therapeutic Exercise:  [x] (55150) Provided verbal/tactile cueing for activities to restore or maintain strength, flexibility, endurance, ROM for improvements with self-care, mobility, lifting and ambulation.     Neuromuscular Re-Education  [] (04781) Provided verbal/tactile cueing for activities to restore or maintain balance, coordination, kinesthetic sense, posture, motor skill, proprioception for self-care, mobility, lifting, and ambulation. Therapeutic Activities:    [] (00590) Provided verbal/tactile cueing to address functional limitations related to loss of mobility, strength, balance, and coordination. Gait Training:  [] (02186) Provided training and instruction to the patient for proper postural muscle recruitment and positioning with ambulation re-education     Home Exercise Program:    [] (16738) Reviewed/Progressed HEP activities related to strengthening, flexibility, endurance, ROM for functional self-care, mobility, lifting and ambulation   [] (45126) Reviewed/Progressed HEP activities related to improving balance, coordination, kinesthetic sense, posture, motor skill, proprioception for self-care, mobility, lifting, and ambulation      Manual Treatments:  MFR / STM / Oscillations-Mobs:  G-I, II, III, IV / Manipulation / MLD  [] (18221) Provided manual therapy to mobilize  soft tissue/joints/fluid for the purpose of modulating pain, promoting relaxation, increasing ROM, reducing/eliminating soft tissue swelling/inflammation/restriction, improving soft tissue extensibility and allowing for proper ROM for normal function with self- care, mobility, lifting and ambulation.         Timed Code Treatment Minutes: 40   Total Treatment Minutes: 40     [] EVAL (LOW) 02483   [] EVAL (MOD) 72710   [] EVAL (HIGH) 60447   [] RE-EVAL   [x] TE (60526) x   2  [] Aquatic (41975) x  [] NMR (37392)   x  [] Aquatic Group (42439) x  [x] Manual (40307) x    [] Ultrasound (15481) x  [] TA (05792) x  [] Mech Traction (73342)  [] Ionto (91708)           [] ES (un) (23267):   [] Vasopump (57268) [] Other:      Assessment  [] Patient tolerated treatment well [] Patient limited by fatigue  [] Patient limited by pain  [] Patient limited by other medical complications  [] Other:     Prognosis: [] Good [] Fair  [] Poor    Goals:       Long term goals  Time Frame for Long term goals : 6 weeks  Long term goal 1: Pt will rate L forearm pain 0-2/10. Long term goal 2: Pt will be able to use L hand to button her shirt. Long term goal 3: Pt will be able to cut food. Long term goal 4: Pt will be independent with HEP. Patient Goals   Patient goals : \"I want to be able to use my hand\" including with eating, cutting, getting dressed (using buttons)              Patient Requires Follow-up: [] Yes  [] No    Plan:   [] Continue per plan of care [] Alter current plan (see comments)  [x] Plan of care initiated [] Hold pending MD visit [] Discharge  Note: If patient does not return for scheduled/ recommended follow up visits, this note will serve as a discharge from care along with most recent update on progress.     Plan for Next Session:  Begin for HEP wrist isometrics extension, flexion, RD    Electronically signed by:  Kristina Manrique, PT

## 2020-11-09 ENCOUNTER — APPOINTMENT (OUTPATIENT)
Dept: PHYSICAL THERAPY | Age: 85
End: 2020-11-09
Payer: MEDICARE

## 2020-11-10 ENCOUNTER — APPOINTMENT (OUTPATIENT)
Dept: PHYSICAL THERAPY | Age: 85
End: 2020-11-10
Payer: MEDICARE

## 2020-11-12 ENCOUNTER — HOSPITAL ENCOUNTER (OUTPATIENT)
Dept: PHYSICAL THERAPY | Age: 85
Setting detail: THERAPIES SERIES
Discharge: HOME | End: 2020-11-12
Payer: MEDICARE

## 2020-11-12 PROCEDURE — 97110 THERAPEUTIC EXERCISES: CPT

## 2020-11-12 PROCEDURE — 97140 MANUAL THERAPY 1/> REGIONS: CPT

## 2020-11-12 NOTE — FLOWSHEET NOTE
Physical Therapy Daily Treatment Note  Date:  2020    Patient Name:  Agata Chapin    :  1934  MRN: [id number]    Restrictions/Precautions:  Restrictions/Precautions  Restrictions/Precautions: Fall Risk(minimal)  Pertinent Medical History:      Medical/Treatment Diagnosis Information:  · Diagnosis: Other closed fracture of distal end of radius, initial encounter (S52.943S)  · Treatment Diagnosis: Decreased ADL status    Insurance/Certification information:  PT Insurance Information: Medicare  Physician Information:  Referring Practitioner: Dr. Uriarte [facility] of care signed (Y/N):  Sent to in basket on 10/28/20     Visit# / total visits:    Pain level: /10     Functional Outcomes Measure:  Test:  Primitivo Mclain  Score: 10/28/20 54.5% dysfunction    Progress Note: []  Yes  []  No  Next due by: Visit #10      History of Injury:Pt broke her L forearm . Since the fracture pt has had limited hand use, stiff fingers and wrist, pain (3/10). Pt has pain sometimes at rest, sometimes with a change in arm positions. Pt stated she cannot /hold items with the L hand- \"I don't have anything in here to hold with it. \"  Pt had L \"arm surgery\" for the forearm- \"these 2 bones were real bad, they were sticking out\" and pointed to radius and ulna. Pt had home therapy for walking and for L UE use. Pt has been using her cane regularly. Pt stated she has numbness of the L 4th and 5th digits. Subjective:     11/3/20 doing no better, no worse  20 progress is \"slow\". Pt stated she is improving in that she is able to use her L hand more while showering, \"I can  my bar of soap now. \"  Pt can use L hand for kitchen duties, but cannot lift anything with the L hand. Pt stated L wrist pain is improving and rated it 0/10. Pt has 5th digit numbness at the ulnar border.   Pt stated the 5th digit has minimal pain and it limits her function more than the rest of the wrist/hand.     Objective:   Observation:    Test measurements:     L wrist AROM  10/28/20  11/3/20  11/12/20    Flexion  60  NT  70    extension  18 (PROM 21)  30 (PROM 30)  35    RD  10 (PROM 11)  18 (PROM 20)  27    UD  25  30 (PROM 30)  30    Pronation  50  55  55    Supination  75  80  80    Shoulder flexion  130    130 (R 130 as well)    L wrist strength          Flexion  4+/5    4+/5    Extension  4+/5    4+/5    RD  4/5    4+/5    UD  4/5    4+/5    Pronators  4+/5    4+/5    Supinators  4/5    4+/5    Biceps  4+/5    5/5    Triceps  4+/5    5/5    Shoulder flexion  4-/5    4+/5    Shoulder ABD  4-/5    4/5    Finger flexion digits 2 and 3  4/5    4+/5    Finger flexion digit 4  3+/5    4/5    Finger flexion digit 5  3-/5    3+/5    Dynamometer (lbs)  L 2, R 60    L 15, 20, 12 pounds        Exercises:  Exercise/Equipment Resistance/Repetitions Other comments   PROM L wrist 10x    PROM L sup/pronation 10x    [place] from floor 4-way x10 ea    Roll ball on table flexion x30    Roll ball on table hor add x30         Digiflex Red 2x15 all / x5 ind         AAROM wrist  RD, EXT, Flex 10x each    AAROM forearm pron/supination 10x each    Isometric wrist  Flexion  RD  Flexion   10x  10x  10x    Dumbbell  Wrist extension  Wrist flexion  Wrist RD  Biceps curls- palm up  Biceps curls- palm down  Pron/supination   1#, 10x2  1#, 10x2  1#, 10x2  1#, 10x; 2# 10x  1#, 10x2  1#, 10x2                Other Therapeutic Activities:      Home Exercise Program:    10/28/20  Pt brought her HEP which included use of theraputty (pink), flexion of DIPs and PIPs, making a fist, thumb/finger opposition, finger ABD/ADD, elbow flex/ext, wand press overhead  11/3/20 AROM supination/pronation    Manual Treatments: mob wrist PA, distraction mob wrist, stretched wrist flexors 4x20\", STM wrist flexors    Modalities:     Progression Towards Functional goals:  [x] Patient is progressing as expected towards functional goals listed. [] Progression is slowed due to complexities listed. [] Progression has been slowed due to co-morbidities. [] Plan just implemented, too soon to assess goals progression  [] Other:    Charges: Therapeutic Exercise:  [x] (09568) Provided verbal/tactile cueing for activities to restore or maintain strength, flexibility, endurance, ROM for improvements with self-care, mobility, lifting and ambulation. Neuromuscular Re-Education  [] (36601) Provided verbal/tactile cueing for activities to restore or maintain balance, coordination, kinesthetic sense, posture, motor skill, proprioception for self-care, mobility, lifting, and ambulation. Therapeutic Activities:    [] (77891) Provided verbal/tactile cueing to address functional limitations related to loss of mobility, strength, balance, and coordination. Gait Training:  [] (19467) Provided training and instruction to the patient for proper postural muscle recruitment and positioning with ambulation re-education     Home Exercise Program:    [] (01460) Reviewed/Progressed HEP activities related to strengthening, flexibility, endurance, ROM for functional self-care, mobility, lifting and ambulation   [] (92994) Reviewed/Progressed HEP activities related to improving balance, coordination, kinesthetic sense, posture, motor skill, proprioception for self-care, mobility, lifting, and ambulation      Manual Treatments:  MFR / STM / Oscillations-Mobs:  G-I, II, III, IV / Manipulation / MLD  [] (56110) Provided manual therapy to mobilize  soft tissue/joints/fluid for the purpose of modulating pain, promoting relaxation, increasing ROM, reducing/eliminating soft tissue swelling/inflammation/restriction, improving soft tissue extensibility and allowing for proper ROM for normal function with self- care, mobility, lifting and ambulation.         Timed Code Treatment Minutes: 40   Total

## 2020-11-12 NOTE — PROGRESS NOTES
Outpatient Physical Therapy  [] [facility]    Phone: [phone]   Fax: [phone]   [] [facility]  Phone: [phone]   Fax: [phone]  [x] [facility]              Phone: [phone]   Fax: [phone]     Physical Therapy Progress Note  Date: 2020        Patient Name:  Yelitza Solomon                          :  1934                   MRN: [id number]     Restrictions/Precautions:  Restrictions/Precautions  Restrictions/Precautions: Fall Risk(minimal)  Pertinent Medical History:       Medical/Treatment Diagnosis Information:  · Diagnosis: Other closed fracture of distal end of radius, initial encounter (M68.364S)  · Treatment Diagnosis: Decreased ADL status     Insurance/Certification information:  PT Insurance Information: Medicare  Physician Information:  Referring Practitioner: Dr. Lizzy Tinajero of care signed (Y/N):  Sent to in basket on 10/28/20     Visit# / total visits:    Pain level:      10            Functional Outcomes Measure:  Test:  Tevin Maciasor  Score: 10/28/20 54.5% dysfunction   Time Period for Report:  10/28/20-20  Cancels/No-shows to date:  0    Plan of Care/Treatment to date:  [x] Therapeutic Exercise    [x] Modalities:  [] Therapeutic Activity     [] Ultrasound  [] Electrical Stimulation  [] Gait Training      [] Cervical Traction    [] Lumbar Traction  [x] Neuromuscular Re-education  [] Cold/hotpack [] Iontophoresis  [x] Instruction in HEP      Other:  [x] Manual Therapy       []    [] Aquatic Therapy       []                          Significant Findings At Last Visit/Comments:    Subjective:     20 progress is \"slow\". Pt stated she is improving in that she is able to use her L hand more while showering, \"I can  my bar of soap now. \"  Pt can use L hand for kitchen duties, but cannot lift anything with the L hand. Pt stated L wrist pain is improving and rated it 0/10. Pt has 5th digit numbness at the ulnar border.   Pt stated the 5th digit has minimal pain and it limits her function more than the rest of the wrist/hand. Objective:   Observation:   Test measurements:    L wrist AROM 10/28/20 11/12/20   Flexion 60 70   extension 18 (PROM 21) 35   RD 10 (PROM 11) 27   UD 25 30   Pronation 50 55   Supination 75 80   Shoulder flexion 130 130 (R 130 as well)   L wrist strength       Flexion 4+/5 4+/5   Extension 4+/5 4+/5   RD 4/5 4+/5   UD 4/5 4+/5   Pronators 4+/5 4+/5   Supinators 4/5 4+/5   Biceps 4+/5 5/5   Triceps 4+/5 5/5   Shoulder flexion 4-/5 4+/5   Shoulder ABD 4-/5 4/5   Finger flexion digits 2 and 3 4/5 4+/5   Finger flexion digit 4 3+/5 4/5   Finger flexion digit 5 3-/5 3+/5   Dynamometer (lbs) L 2, R 60 L 15, 20, 12 pounds        Assessment:   Summary: ROM, strength, function, pain are all improving. Pt has limited function. Progression Towards Functional goals:  [x] Patient is progressing as expected towards functional goals listed. [] Progression is slowed due to complexities listed. [] Progression has been slowed due to co-morbidities. [] Plan just implemented, too soon to assess goals progression  [] Other:    Rehab Potential: [] Excellent [x] Good [] Fair  [] Poor     Goal Status:  [] Achieved [x] Partially Achieved  [] Not Achieved       Patient Status: [x] Continue per initial plan of Care     [] Patient now discharged     [] Additional visits requested, Please re-certify for additional visits:      Requested frequency/duration:  X/week for weeks    Electronically signed by:  Sunny De Anda, PT    If you have any questions or concerns, please don't hesitate to call.   Thank you for your referral.    Physician Signature:________________________________Date:__________________  By signing above, therapists plan is approved by physician

## 2020-11-17 ENCOUNTER — HOSPITAL ENCOUNTER (OUTPATIENT)
Dept: PHYSICAL THERAPY | Age: 85
Setting detail: THERAPIES SERIES
Discharge: HOME | End: 2020-11-17
Payer: MEDICARE

## 2020-11-17 PROCEDURE — 97110 THERAPEUTIC EXERCISES: CPT

## 2020-11-17 PROCEDURE — 97140 MANUAL THERAPY 1/> REGIONS: CPT

## 2020-11-17 NOTE — FLOWSHEET NOTE
Physical Therapy Daily Treatment Note  Date:  2020    Patient Name:  Mirian Day    :  1934  MRN: [id number]    Restrictions/Precautions:  Restrictions/Precautions  Restrictions/Precautions: Fall Risk(minimal)  Pertinent Medical History:      Medical/Treatment Diagnosis Information:  · Diagnosis: Other closed fracture of distal end of radius, initial encounter (S52.590P)  · Treatment Diagnosis: Decreased ADL status    Insurance/Certification information:  PT Insurance Information: Medicare  Physician Information:  Referring Practitioner: Dr. Denise Galindo of care signed (Y/N):  Sent to in basket on 10/28/20     Visit# / total visits:    Pain level: /10     Functional Outcomes Measure:  Test:  Gisela Jefry  Score: 10/28/20 54.5% dysfunction    Progress Note: []  Yes  []  No  Next due by: Visit #10      History of Injury:Pt broke her L forearm . Since the fracture pt has had limited hand use, stiff fingers and wrist, pain (3/10). Pt has pain sometimes at rest, sometimes with a change in arm positions. Pt stated she cannot /hold items with the L hand- \"I don't have anything in here to hold with it. \"  Pt had L \"arm surgery\" for the forearm- \"these 2 bones were real bad, they were sticking out\" and pointed to radius and ulna. Pt had home therapy for walking and for L UE use. Pt has been using her cane regularly. Pt stated she has numbness of the L 4th and 5th digits. Subjective:     11/3/20 doing no better, no worse  20 progress is \"slow\". Pt stated she is improving in that she is able to use her L hand more while showering, \"I can  my bar of soap now. \"  Pt can use L hand for kitchen duties, but cannot lift anything with the L hand. Pt stated L wrist pain is improving and rated it 0/10. Pt has 5th digit numbness at the ulnar border. Pt stated the 5th digit has minimal pain and it limits her function more than the rest of the wrist/hand.   20 wrist is doing well.   C/o problems with digits 4 and 5- \"they don't do nothing\" and \"they get real cold\" and \"they hurt\"    Objective:   Observation:    Test measurements:     L wrist AROM  10/28/20  11/3/20  11/12/20    Flexion  60  NT  70    extension  18 (PROM 21)  30 (PROM 30)  28    RD  10 (PROM 11)  18 (PROM 20)  32    UD  25  30 (PROM 30)  30    Pronation  50  55  55    Supination  75  80  80    Shoulder flexion  130    130 (R 130 as well)    L wrist strength          Flexion  4+/5    4+/5    Extension  4+/5    4+/5    RD  4/5    4+/5    UD  4/5    4+/5    Pronators  4+/5    4+/5    Supinators  4/5    4+/5    Biceps  4+/5    5/5    Triceps  4+/5    5/5    Shoulder flexion  4-/5    4+/5    Shoulder ABD  4-/5    4/5    Finger flexion digits 2 and 3  4/5    4+/5    Finger flexion digit 4  3+/5    4/5    Finger flexion digit 5  3-/5    3+/5    Dynamometer (lbs)  L 2, R 60    L 15, 20, 12 pounds        Exercises:  Exercise/Equipment Resistance/Repetitions Other comments   PROM L wrist 10x    PROM L sup/pronation 10x    [place] from floor 4-way x10 ea    Roll ball on table flexion x30    Roll ball on table hor add x30         Digiflex Red 20x  Green 20x    T-slide  Rows  Triceps curls  Shoulder extension     AAROM wrist  RD, EXT, Flex 10x each    AAROM forearm pron/supination 10x each    Isometric wrist  Flexion  Pronation  Flexion   10x  5x  10x    Dumbbell  Wrist extension  Wrist flexion  Wrist RD  Biceps curls- palm up  Biceps curls- palm down  Pron/supination   1#, 15x, 2# 10x  1#, 15x, 2# 10x  1#, 15x, 2# 10x  1#, 10x; 2# 10x, 3# 10x  1#, 10x2  1#, 15x2    AAROM digit 5 add 10x    AROM MCP flexion 10x    Isometric digit 4 and 5  MCP flexion  ADD   10x  3x      Other Therapeutic Activities:      Home Exercise Program:    10/28/20  Pt brought her HEP which included use of theraputty (pink), flexion of DIPs and PIPs, making a fist, thumb/finger opposition, finger ABD/ADD, elbow flex/ext, wand press overhead  11/3/20 AROM supination/pronation    Manual Treatments: mob wrist PA, distraction mob wrist, stretched wrist flexors 4x20\", STM wrist flexors    Modalities:     Progression Towards Functional goals:  [x] Patient is progressing as expected towards functional goals listed. [] Progression is slowed due to complexities listed. [] Progression has been slowed due to co-morbidities. [] Plan just implemented, too soon to assess goals progression  [] Other:    Charges: Therapeutic Exercise:  [x] (11587) Provided verbal/tactile cueing for activities to restore or maintain strength, flexibility, endurance, ROM for improvements with self-care, mobility, lifting and ambulation. Neuromuscular Re-Education  [] (34213) Provided verbal/tactile cueing for activities to restore or maintain balance, coordination, kinesthetic sense, posture, motor skill, proprioception for self-care, mobility, lifting, and ambulation. Therapeutic Activities:    [] (64566) Provided verbal/tactile cueing to address functional limitations related to loss of mobility, strength, balance, and coordination.      Gait Training:  [] (09193) Provided training and instruction to the patient for proper postural muscle recruitment and positioning with ambulation re-education     Home Exercise Program:    [] (36097) Reviewed/Progressed HEP activities related to strengthening, flexibility, endurance, ROM for functional self-care, mobility, lifting and ambulation   [] (27968) Reviewed/Progressed HEP activities related to improving balance, coordination, kinesthetic sense, posture, motor skill, proprioception for self-care, mobility, lifting, and ambulation      Manual Treatments:  MFR / STM / Oscillations-Mobs:  G-I, II, III, IV / Manipulation / MLD  [] (17192) Provided manual therapy to mobilize  soft tissue/joints/fluid for the purpose of modulating pain, promoting relaxation, increasing ROM, reducing/eliminating soft tissue swelling/inflammation/restriction, improving soft tissue extensibility and allowing for proper ROM for normal function with self- care, mobility, lifting and ambulation. Timed Code Treatment Minutes: 43   Total Treatment Minutes: 43     [] EVAL (LOW) 07230   [] EVAL (MOD) 96492   [] EVAL (HIGH) 84704   [] RE-EVAL   [x] TE (93974) x   2  [] Aquatic (69881) x  [] NMR (02058)   x  [] Aquatic Group (19678) x  [x] Manual (21758) x    [] Ultrasound (70305) x  [] TA (69505) x  [] Mech Traction (84338)  [] Ionto (62902)           [] ES (un) (42039):   [] Vasopump (75403) [] Other:      Assessment  [] Patient tolerated treatment well [] Patient limited by fatigue  [] Patient limited by pain  [] Patient limited by other medical complications  [] Other:     Prognosis: [] Good [] Fair  [] Poor    Goals:       Long term goals  Time Frame for Long term goals : 6 weeks  Long term goal 1: Pt will rate L forearm pain 0-2/10. Long term goal 2: Pt will be able to use L hand to button her shirt. Long term goal 3: Pt will be able to cut food. Long term goal 4: Pt will be independent with HEP. Patient Goals   Patient goals : \"I want to be able to use my hand\" including with eating, cutting, getting dressed (using buttons)              Patient Requires Follow-up: [] Yes  [] No    Plan:   [x] Continue per plan of care [] Alter current plan (see comments)  [] Plan of care initiated [] Hold pending MD visit [] Discharge  Note: If patient does not return for scheduled/ recommended follow up visits, this note will serve as a discharge from care along with most recent update on progress.     Plan for Next Session:  Begin for HEP wrist isometrics extension, flexion, RD    Electronically signed by:  Arian Granado PT

## 2020-11-18 ENCOUNTER — OFFICE VISIT (OUTPATIENT)
Dept: ORTHOPEDIC SURGERY | Age: 85
End: 2020-11-18
Payer: MEDICARE

## 2020-11-18 VITALS — HEIGHT: 68 IN | BODY MASS INDEX: 21.98 KG/M2 | WEIGHT: 145 LBS | TEMPERATURE: 97.2 F

## 2020-11-18 PROCEDURE — 1090F PRES/ABSN URINE INCON ASSESS: CPT | Performed by: ORTHOPAEDIC SURGERY

## 2020-11-18 PROCEDURE — 1036F TOBACCO NON-USER: CPT | Performed by: ORTHOPAEDIC SURGERY

## 2020-11-18 PROCEDURE — 1123F ACP DISCUSS/DSCN MKR DOCD: CPT | Performed by: ORTHOPAEDIC SURGERY

## 2020-11-18 PROCEDURE — 99213 OFFICE O/P EST LOW 20 MIN: CPT | Performed by: ORTHOPAEDIC SURGERY

## 2020-11-18 PROCEDURE — G8484 FLU IMMUNIZE NO ADMIN: HCPCS | Performed by: ORTHOPAEDIC SURGERY

## 2020-11-18 PROCEDURE — 4040F PNEUMOC VAC/ADMIN/RCVD: CPT | Performed by: ORTHOPAEDIC SURGERY

## 2020-11-18 PROCEDURE — G8420 CALC BMI NORM PARAMETERS: HCPCS | Performed by: ORTHOPAEDIC SURGERY

## 2020-11-18 PROCEDURE — G8427 DOCREV CUR MEDS BY ELIG CLIN: HCPCS | Performed by: ORTHOPAEDIC SURGERY

## 2020-11-18 NOTE — PROGRESS NOTES
DIAGNOSIS:  Left distal radius 2 parts intra-articular displaced comminuted fracture, status post ORIF. DATE OF SURGERY:  8/10/2020. HISTORY OF PRESENT ILLNESS:  Claykvng Console 80 y.o.  female right handed returns today  for 3 months postoperative visit. The patient denies any significant pain in the left wrist. Rates pain a 0-2/10 VAS mild, aching, intermittent and improving. Pain is worse with working with PT and with using her 4th and 5th fingers and better with rest and stretching. No numbness or tingling sensation. No fever or Chills. He has been working with PT.       Past Medical History:   Diagnosis Date    Allergic rhinitis     GERD (gastroesophageal reflux disease)     Hyperlipidemia     Laceration of head 06/23/2016       Past Surgical History:   Procedure Laterality Date    APPENDECTOMY      COLONOSCOPY  4-2011    FOREARM SURGERY Left 8/10/2020    OPEN REDUCTION INTERNAL FIXATION LEFT DISTAL RADIUS with mini c-arm performed by Emiliano Menjivar MD at [address] [address]  8/2010    left Ulna    KNEE SURGERY      left torn meniscus       Social History     Socioeconomic History    Marital status:      Spouse name: Not on file    Number of children: Not on file    Years of education: Not on file    Highest education level: Not on file   Occupational History    Not on file   Social Needs    Financial resource strain: Not on file    Food insecurity     Worry: Not on file     Inability: Not on file    Transportation needs     Medical: Not on file     Non-medical: Not on file   Tobacco Use    Smoking status: Never Smoker    Smokeless tobacco: Former User   Substance and Sexual Activity    Alcohol use: No    Drug use: No    Sexual activity: Not on file   Lifestyle    Physical activity     Days per week: Not on file     Minutes per session: Not on file    Stress: Not on file   Relationships    Social connections     Talks on phone: Not on file     Gets together: Not on file     Attends [employer] service: Not on file     Active member of club or organization: Not on file     Attends meetings of clubs or organizations: Not on file     Relationship status: Not on file    Intimate partner violence     Fear of current or ex partner: Not on file     Emotionally abused: Not on file     Physically abused: Not on file     Forced sexual activity: Not on file   Other Topics Concern    Not on file   Social History Narrative    Not on file       Family History   Problem Relation Age of Onset    Arthritis Mother     Heart Attack Father        Current Outpatient Medications on File Prior to Visit   Medication Sig Dispense Refill    aspirin 325 MG tablet Take 325 mg by mouth      Omeprazole (PRILOSEC PO) Take by mouth       No current facility-administered medications on file prior to visit. Pertinent items are noted in HPI  Review of systems reviewed from Patient History Form dated on 10/6/2020 and available in the patient's chart under the Media tab. No change noted. PHYSICAL EXAMINATION:  Ms. Armando Nath is a very pleasant 80 y.o.  female who presents today in no acute distress, awake, alert, and oriented. She is well dressed, nourished and  groomed. Patient with normal affect. Height is  5' 8\" (1.727 m), weight is 145 lb (65.8 kg), Body mass index is 22.05 kg/m². Resting respiratory rate is 16. The patient ambulates with a slow gait using a cane. The incision is completely healed . The wrist laceration has completely healed. No signs of any erythema or drainage. She  has no pain with the active or passive range of motion of the left wrist, minimally decrease ROM with supination and in her fourth and fifth fingers DIP and PIP joints. She is able to make a full fist.  She  has intact sensation, distally, and she  is neurovascularly intact.     IMAGING:  Three views left wrist taken today in the office showed anatomic alignment of left distal radius, plate and screws in good position, no loosening. There is an ulnar plate and screws from prior injury. There is a  distal ulnar fracture, distal from the hardware, with callus. IMPRESSION: 3 months out from left distal radius ORIF and doing very well. PLAN:  I have told the patient to work on ROM, as well as strengthening exercises with physical therapy and to be given a home exercise program. She can go back to normal activity with no restrictions. I told the patient that it is not unusual to have some achy pain and swelling for up to a year after a fracture. The patient will come back for a follow up in 3 months. At that time, we will take 3 views of the left wrist.     As this patient has demonstrated risk factors for osteoporosis, such as age and evidence of a fracture, I have referred the patient back to the primary care physician for evaluation for osteoporosis, including consideration for DEXA scanning, if this is felt to be clinically indicated. The patient is advised to contact the primary care physician to follow-up for further evaluation.        Heddy Sacks, MD

## 2020-11-19 ENCOUNTER — HOSPITAL ENCOUNTER (OUTPATIENT)
Dept: PHYSICAL THERAPY | Age: 85
Setting detail: THERAPIES SERIES
Discharge: HOME | End: 2020-11-19
Payer: MEDICARE

## 2020-11-19 PROCEDURE — 97140 MANUAL THERAPY 1/> REGIONS: CPT

## 2020-11-19 PROCEDURE — 97110 THERAPEUTIC EXERCISES: CPT

## 2020-11-19 NOTE — FLOWSHEET NOTE
Physical Therapy Daily Treatment Note  Date:  2020    Patient Name:  Sindy Martínez    :  1934  MRN: [id number]    Restrictions/Precautions:  Restrictions/Precautions  Restrictions/Precautions: Fall Risk(minimal)  Pertinent Medical History:      Medical/Treatment Diagnosis Information:  · Diagnosis: Other closed fracture of distal end of radius, initial encounter (S52.592A)  · Treatment Diagnosis: Decreased ADL status    Insurance/Certification information:  PT Insurance Information: Medicare  Physician Information:  Referring Practitioner: Dr. Marcel Godwin of care signed (Y/N):  Sent to in basket on 10/28/20     Visit# / total visits:    Pain level: /10     Functional Outcomes Measure:  Test:  Laury Bora  Score: 10/28/20 54.5% dysfunction    Progress Note: []  Yes  []  No  Next due by: Visit #10      History of Injury:Pt broke her L forearm . Since the fracture pt has had limited hand use, stiff fingers and wrist, pain (3/10). Pt has pain sometimes at rest, sometimes with a change in arm positions. Pt stated she cannot /hold items with the L hand- \"I don't have anything in here to hold with it. \"  Pt had L \"arm surgery\" for the forearm- \"these 2 bones were real bad, they were sticking out\" and pointed to radius and ulna. Pt had home therapy for walking and for L UE use. Pt has been using her cane regularly. Pt stated she has numbness of the L 4th and 5th digits. Subjective:     11/3/20 doing no better, no worse  20 progress is \"slow\". Pt stated she is improving in that she is able to use her L hand more while showering, \"I can  my bar of soap now. \"  Pt can use L hand for kitchen duties, but cannot lift anything with the L hand. Pt stated L wrist pain is improving and rated it 0/10. Pt has 5th digit numbness at the ulnar border. Pt stated the 5th digit has minimal pain and it limits her function more than the rest of the wrist/hand.   20 wrist is doing well. C/o problems with digits 4 and 5- \"they don't do nothing\" and \"they get real cold\" and \"they hurt\"  11/19/20 pt stated she saw Dr. Kaleigh Silveira. Wrist is healing well.     Objective:   Observation:    Test measurements:     L wrist AROM  10/28/20  11/3/20  11/12/20    Flexion  60  NT  70    extension  18 (PROM 21)  30 (PROM 30)  35    RD  10 (PROM 11)  18 (PROM 20)  27    UD  25  30 (PROM 30)  30    Pronation  50  55  55    Supination  75  80  80    Shoulder flexion  130    130 (R 130 as well)    L wrist strength          Flexion  4+/5    4+/5    Extension  4+/5    4+/5    RD  4/5    4+/5    UD  4/5    4+/5    Pronators  4+/5    4+/5    Supinators  4/5    4+/5    Biceps  4+/5    5/5    Triceps  4+/5    5/5    Shoulder flexion  4-/5    4+/5    Shoulder ABD  4-/5    4/5    Finger flexion digits 2 and 3  4/5    4+/5    Finger flexion digit 4  3+/5    4/5    Finger flexion digit 5  3-/5    3+/5    Dynamometer (lbs)  L 2, R 60    L 15, 20, 12 pounds        Exercises:  Exercise/Equipment Resistance/Repetitions Other comments   PROM L wrist 10x    PROM L sup/pronation 10x    [place] from floor 4-way x10 ea    Roll ball on table flexion x30    Roll ball on table hor add x30         Digiflex Red 20x  Green 20x    T-slide  Rows  Triceps curls  Shoulder extension   Yellow 10x  Yellow 10x  Yellow 10x    AAROM wrist  RD, EXT, Flex 10x each    AAROM forearm pron/supination 10x each    Isometric wrist  Flexion  Pronation  Flexion   10x  5x  10x    Dumbbell  Wrist extension  Wrist flexion  Wrist RD  Biceps curls- palm up  Biceps curls- palm down  Pron/supination   1#, 15x, 2# 12x  1#, 15x, 2# 12x  1#, 15x, 2# 12x  1#, 10x; 2# 12x, 3# 10x  1#, 10x1  1#, 15x2    AAROM digit 5 add 10x         Isometric digit 4 and 5  MCP flexion  Isometric digit 2,3,4, 5 ADD   10x  5x each    Open/close hand  AROM MCP flexion/ext 10x  10x, with min A Other Therapeutic Activities:      Home Exercise Program:    10/28/20  Pt brought her HEP which included use of theraputty (pink), flexion of DIPs and PIPs, making a fist, thumb/finger opposition, finger ABD/ADD, elbow flex/ext, wand press overhead  11/3/20 AROM supination/pronation    Manual Treatments: mob wrist PA, distraction mob wrist, stretched wrist flexors 4x20\", STM wrist flexors    Modalities:     Progression Towards Functional goals:  [x] Patient is progressing as expected towards functional goals listed. [] Progression is slowed due to complexities listed. [] Progression has been slowed due to co-morbidities. [] Plan just implemented, too soon to assess goals progression  [] Other:    Charges: Therapeutic Exercise:  [x] (83416) Provided verbal/tactile cueing for activities to restore or maintain strength, flexibility, endurance, ROM for improvements with self-care, mobility, lifting and ambulation. Neuromuscular Re-Education  [] (13463) Provided verbal/tactile cueing for activities to restore or maintain balance, coordination, kinesthetic sense, posture, motor skill, proprioception for self-care, mobility, lifting, and ambulation. Therapeutic Activities:    [] (85560) Provided verbal/tactile cueing to address functional limitations related to loss of mobility, strength, balance, and coordination.      Gait Training:  [] (96924) Provided training and instruction to the patient for proper postural muscle recruitment and positioning with ambulation re-education     Home Exercise Program:    [] (90413) Reviewed/Progressed HEP activities related to strengthening, flexibility, endurance, ROM for functional self-care, mobility, lifting and ambulation   [] (68437) Reviewed/Progressed HEP activities related to improving balance, coordination, kinesthetic sense, posture, motor skill, proprioception for self-care, mobility, lifting, and ambulation      Manual Treatments:  MFR / STM / Oscillations-Mobs:  G-I, II, III, IV / Manipulation / MLD  [] (53933) Provided manual therapy to mobilize  soft tissue/joints/fluid for the purpose of modulating pain, promoting relaxation, increasing ROM, reducing/eliminating soft tissue swelling/inflammation/restriction, improving soft tissue extensibility and allowing for proper ROM for normal function with self- care, mobility, lifting and ambulation. Timed Code Treatment Minutes: 43   Total Treatment Minutes: 43     [] EVAL (LOW) 62774   [] EVAL (MOD) 32122   [] EVAL (HIGH) 35884   [] RE-EVAL   [x] TE (84078) x   2  [] Aquatic (10887) x  [] NMR (09372)   x  [] Aquatic Group (48484) x  [x] Manual (16564) x    [] Ultrasound (55700) x  [] TA (14635) x  [] Mech Traction (29884)  [] Ionto (63341)           [] ES (un) (54287):   [] Vasopump (32895) [] Other:      Assessment  [] Patient tolerated treatment well [] Patient limited by fatigue  [] Patient limited by pain  [] Patient limited by other medical complications  [] Other:     Prognosis: [] Good [] Fair  [] Poor    Goals:       Long term goals  Time Frame for Long term goals : 6 weeks  Long term goal 1: Pt will rate L forearm pain 0-2/10. Long term goal 2: Pt will be able to use L hand to button her shirt. Long term goal 3: Pt will be able to cut food. Long term goal 4: Pt will be independent with HEP. Patient Goals   Patient goals : \"I want to be able to use my hand\" including with eating, cutting, getting dressed (using buttons)              Patient Requires Follow-up: [] Yes  [] No    Plan:   [x] Continue per plan of care [] Alter current plan (see comments)  [] Plan of care initiated [] Hold pending MD visit [] Discharge  Note: If patient does not return for scheduled/ recommended follow up visits, this note will serve as a discharge from care along with most recent update on progress.     Plan for Next Session:  Reassess, has 1 more PT session scheduled    Electronically signed by:  Choco Cordon PT

## 2020-11-24 ENCOUNTER — HOSPITAL ENCOUNTER (OUTPATIENT)
Dept: PHYSICAL THERAPY | Age: 85
Setting detail: THERAPIES SERIES
Discharge: HOME | End: 2020-11-24
Payer: MEDICARE

## 2020-11-24 PROCEDURE — 97112 NEUROMUSCULAR REEDUCATION: CPT

## 2020-11-24 PROCEDURE — 97110 THERAPEUTIC EXERCISES: CPT

## 2020-11-24 NOTE — FLOWSHEET NOTE
Physical Therapy Daily Treatment Note  Date:  2020    Patient Name:  Rebekah Gandhi    :  1934  MRN: [id number]    Restrictions/Precautions:  Restrictions/Precautions  Restrictions/Precautions: Fall Risk(minimal)  Pertinent Medical History:      Medical/Treatment Diagnosis Information:  · Diagnosis: Other closed fracture of distal end of radius, initial encounter (S52.301L)  · Treatment Diagnosis: Decreased ADL status    Insurance/Certification information:  PT Insurance Information: Medicare  Physician Information:  Referring Practitioner: Dr. Binh Peña of care signed (Y/N):  Sent to in basket on 10/28/20     Visit# / total visits:    Pain level: /10     Functional Outcomes Measure:  Test:  Shanna Alireza  Score: 10/28/20 54.5% dysfunction    Progress Note: []  Yes  []  No  Next due by: Visit #10      History of Injury:Pt broke her L forearm . Since the fracture pt has had limited hand use, stiff fingers and wrist, pain (3/10). Pt has pain sometimes at rest, sometimes with a change in arm positions. Pt stated she cannot /hold items with the L hand- \"I don't have anything in here to hold with it. \"  Pt had L \"arm surgery\" for the forearm- \"these 2 bones were real bad, they were sticking out\" and pointed to radius and ulna. Pt had home therapy for walking and for L UE use. Pt has been using her cane regularly. Pt stated she has numbness of the L 4th and 5th digits. Subjective:     11/3/20 doing no better, no worse  20 progress is \"slow\". Pt stated she is improving in that she is able to use her L hand more while showering, \"I can  my bar of soap now. \"  Pt can use L hand for kitchen duties, but cannot lift anything with the L hand. Pt stated L wrist pain is improving and rated it 0/10. Pt has 5th digit numbness at the ulnar border. Pt stated the 5th digit has minimal pain and it limits her function more than the rest of the wrist/hand.   20 wrist is down  Pron/supination   1#, 15x, 2# 12x  1#, 15x, 2# 12x  1#, 15x, 2# 12x  1#, 10x; 2# 12x, 3# 10x  1#, 10x1  1#, 15x2    AAROM digit 5 add 10x         Isometric digit 4 and 5  MCP flexion  Isometric digit 2,3,4, 5 ADD   10x  5x each    Open/close hand  AROM MCP flexion/ext 10x  10x, with min A      Other Therapeutic Activities:      Home Exercise Program:    10/28/20  Pt brought her HEP which included use of theraputty (pink), flexion of DIPs and PIPs, making a fist, thumb/finger opposition, finger ABD/ADD, elbow flex/ext, wand press overhead  11/3/20 AROM supination/pronation    Manual Treatments:     Modalities:     Progression Towards Functional goals:  [x] Patient is progressing as expected towards functional goals listed. [] Progression is slowed due to complexities listed. [] Progression has been slowed due to co-morbidities. [] Plan just implemented, too soon to assess goals progression  [] Other:    Charges: Therapeutic Exercise:  [x] (19060) Provided verbal/tactile cueing for activities to restore or maintain strength, flexibility, endurance, ROM for improvements with self-care, mobility, lifting and ambulation. Neuromuscular Re-Education  [] (95979) Provided verbal/tactile cueing for activities to restore or maintain balance, coordination, kinesthetic sense, posture, motor skill, proprioception for self-care, mobility, lifting, and ambulation. Therapeutic Activities:    [] (83117) Provided verbal/tactile cueing to address functional limitations related to loss of mobility, strength, balance, and coordination.      Gait Training:  [] (60129) Provided training and instruction to the patient for proper postural muscle recruitment and positioning with ambulation re-education     Home Exercise Program:    [] (75256) Reviewed/Progressed HEP activities related to strengthening, flexibility, endurance, ROM for functional self-care, mobility, lifting and ambulation   [] (68079) Reviewed/Progressed HEP activities related to improving balance, coordination, kinesthetic sense, posture, motor skill, proprioception for self-care, mobility, lifting, and ambulation      Manual Treatments:  MFR / STM / Oscillations-Mobs:  G-I, II, III, IV / Manipulation / MLD  [] (75509) Provided manual therapy to mobilize  soft tissue/joints/fluid for the purpose of modulating pain, promoting relaxation, increasing ROM, reducing/eliminating soft tissue swelling/inflammation/restriction, improving soft tissue extensibility and allowing for proper ROM for normal function with self- care, mobility, lifting and ambulation. Timed Code Treatment Minutes: 40   Total Treatment Minutes: 40     [] EVAL (LOW) 22231   [] EVAL (MOD) 33153   [] EVAL (HIGH) 62271   [] RE-EVAL   [x] TE (97830) x   2  [] Aquatic (70665) x  [x] NMR (06745)   x  [] Aquatic Group (24541) x  [] Manual (29514) x    [] Ultrasound (81290) x  [] TA (30121) x  [] Mech Traction (23697)  [] Ionto (15167)           [] ES (un) (22668):   [] Vasopump (26997) [] Other:      Assessment  [] Patient tolerated treatment well [] Patient limited by fatigue  [] Patient limited by pain  [] Patient limited by other medical complications  [] Other:     Prognosis: [] Good [] Fair  [] Poor    Goals:       Long term goals  Time Frame for Long term goals : 6 weeks  Long term goal 1: Pt will rate L forearm pain 0-2/10. Long term goal 2: Pt will be able to use L hand to button her shirt. Long term goal 3: Pt will be able to cut food. Long term goal 4: Pt will be independent with HEP.   Patient Goals   Patient goals : \"I want to be able to use my hand\" including with eating, cutting, getting dressed (using buttons)              Patient Requires Follow-up: [] Yes  [] No    Plan:   [x] Continue per plan of care [] Alter current plan (see comments)  [] Plan of care initiated [] Hold pending MD visit [] Discharge  Note: If patient does not return for scheduled/ recommended follow up visits, this note will serve as a discharge from care along with most recent update on progress.     Plan for Next Session:      Electronically signed by:  Chrystal Madrid PT

## 2020-11-30 ENCOUNTER — HOSPITAL ENCOUNTER (OUTPATIENT)
Dept: PHYSICAL THERAPY | Age: 85
Setting detail: THERAPIES SERIES
Discharge: HOME | End: 2020-11-30
Payer: MEDICARE

## 2020-11-30 PROCEDURE — 97110 THERAPEUTIC EXERCISES: CPT

## 2020-11-30 PROCEDURE — 97112 NEUROMUSCULAR REEDUCATION: CPT

## 2020-11-30 NOTE — FLOWSHEET NOTE
Physical Therapy Daily Treatment Note  Date:  2020    Patient Name:  Socorro Connelly    :  1934  MRN: [id number]    Restrictions/Precautions:  Restrictions/Precautions  Restrictions/Precautions: Fall Risk(minimal)  Pertinent Medical History:      Medical/Treatment Diagnosis Information:  · Diagnosis: Other closed fracture of distal end of radius, initial encounter (S52.580J)  · Treatment Diagnosis: Decreased ADL status    Insurance/Certification information:  PT Insurance Information: Medicare  Physician Information:  Referring Practitioner: Dr. Edmar Bravo of care signed (Y/N):  Sent to in basket on 10/28/20      Visit# / total visits:    Pain level: /10     Functional Outcomes Measure:  Test:  [facility]  Score: 10/28/20 54.5% dysfunction    Progress Note: []  Yes  []  No  Next due by: Visit #10      History of Injury:Pt broke her L forearm . Since the fracture pt has had limited hand use, stiff fingers and wrist, pain (3/10). Pt has pain sometimes at rest, sometimes with a change in arm positions. Pt stated she cannot /hold items with the L hand- \"I don't have anything in here to hold with it. \"  Pt had L \"arm surgery\" for the forearm- \"these 2 bones were real bad, they were sticking out\" and pointed to radius and ulna. Pt had home therapy for walking and for L UE use. Pt has been using her cane regularly. Pt stated she has numbness of the L 4th and 5th digits. Subjective:     11/3/20 doing no better, no worse  20 progress is \"slow\". Pt stated she is improving in that she is able to use her L hand more while showering, \"I can  my bar of soap now. \"  Pt can use L hand for kitchen duties, but cannot lift anything with the L hand. Pt stated L wrist pain is improving and rated it 0/10. Pt has 5th digit numbness at the ulnar border. Pt stated the 5th digit has minimal pain and it limits her function more than the rest of the wrist/hand.   20 wrist is doing well. C/o problems with digits 4 and 5- \"they don't do nothing\" and \"they get real cold\" and \"they hurt\"  11/19/20 pt stated she saw Dr. Emily Mata. Wrist is healing well. 11/24/20 pt is using the L arm to do more activities at home, but has continued difficulty with the pinky  11/30/20 numbness of the ulnar side of the hand is decreasing.     Objective:   Observation:    Test measurements:     L wrist AROM  10/28/20  11/3/20  11/12/20  11/24/20    Flexion  60  NT  70  70    extension  18 (PROM 21)  30 (PROM 30)  35  45    RD  10 (PROM 11)  18 (PROM 20)  27  27    UD  25  30 (PROM 30)  30  32    Pronation  50  55  55  68    Supination  75  80  80  80    Shoulder flexion  130    130 (R 130 as well)      L wrist strength            Flexion  4+/5    4+/5  5/5    Extension  4+/5    4+/5  5/5    RD  4/5    4+/5  5/5    UD  4/5    4+/5  5/5    Pronators  4+/5    4+/5  4+/5    Supinators  4/5    4+/5  4+/5    Biceps  4+/5    5/5  5/5    Triceps  4+/5    5/5  5/5    Shoulder flexion  4-/5    4+/5  4+/5    Shoulder ABD  4-/5    4/5  4/5    Finger flexion digits 2 and 3  4/5    4+/5  4+/5    Finger flexion digit 4  3+/5    4/5  4/5    Finger flexion digit 5  3-/5    3+/5  3+/5    Dynamometer (lbs)  L 2, R 60    L 15, 20, 12 pounds  13        Exercises:  Exercise/Equipment Resistance/Repetitions Other comments   PROM L wrist 10x    PROM L sup/pronation 10x    Stem from floor 4-way x10 ea    Roll ball on table flexion x30    Roll ball on table hor add x30    Roll red ball on wall     Digiflex Red 20x  Green 20x    T-slide  Rows  Triceps curls  Shoulder extension  Biceps curls   Yellow 10x2  Yellow 10x2  Yellow 10x2  Yellow 10x2    AAROM wrist  RD, EXT 10x each    AAROM forearm pronation 10x     Isometric wrist  Flexion  Pronation  Flexion   10x  5x  10x    Dumbbell  Wrist extension  Wrist flexion  Wrist ambulation   [] (15365) Reviewed/Progressed HEP activities related to improving balance, coordination, kinesthetic sense, posture, motor skill, proprioception for self-care, mobility, lifting, and ambulation      Manual Treatments:  MFR / STM / Oscillations-Mobs:  G-I, II, III, IV / Manipulation / MLD  [] (56469) Provided manual therapy to mobilize  soft tissue/joints/fluid for the purpose of modulating pain, promoting relaxation, increasing ROM, reducing/eliminating soft tissue swelling/inflammation/restriction, improving soft tissue extensibility and allowing for proper ROM for normal function with self- care, mobility, lifting and ambulation. Timed Code Treatment Minutes: 42   Total Treatment Minutes: 42     [] EVAL (LOW) 24277   [] EVAL (MOD) 45282   [] EVAL (HIGH) 75761   [] RE-EVAL   [x] TE (45487) x   2  [] Aquatic (06834) x  [x] NMR (55053)   x  [] Aquatic Group (10212) x  [] Manual (38603) x    [] Ultrasound (50585) x  [] TA (86161) x  [] Mech Traction (96559)  [] Ionto (26732)           [] ES (un) (35119):   [] Vasopump (75335) [] Other:      Assessment  [] Patient tolerated treatment well [] Patient limited by fatigue  [] Patient limited by pain  [] Patient limited by other medical complications  [] Other:     Prognosis: [] Good [] Fair  [] Poor    Goals:       Long term goals  Time Frame for Long term goals : 6 weeks  Long term goal 1: Pt will rate L forearm pain 0-2/10. Long term goal 2: Pt will be able to use L hand to button her shirt. Long term goal 3: Pt will be able to cut food. Long term goal 4: Pt will be independent with HEP.   Patient Goals   Patient goals : \"I want to be able to use my hand\" including with eating, cutting, getting dressed (using buttons)              Patient Requires Follow-up: [] Yes  [] No    Plan:   [x] Continue per plan of care [] Alter current plan (see comments)  [] Plan of care initiated [] Hold pending MD visit [] Discharge  Note: If patient does not return for scheduled/ recommended follow up visits, this note will serve as a discharge from care along with most recent update on progress.     Plan for Next Session:      Electronically signed by:  Sunny De Anda PT

## 2020-12-02 ENCOUNTER — HOSPITAL ENCOUNTER (OUTPATIENT)
Dept: PHYSICAL THERAPY | Age: 85
Setting detail: THERAPIES SERIES
Discharge: HOME | End: 2020-12-02
Payer: MEDICARE

## 2020-12-02 PROCEDURE — 97110 THERAPEUTIC EXERCISES: CPT

## 2020-12-02 PROCEDURE — 97112 NEUROMUSCULAR REEDUCATION: CPT

## 2020-12-07 ENCOUNTER — HOSPITAL ENCOUNTER (OUTPATIENT)
Dept: PHYSICAL THERAPY | Age: 85
Setting detail: THERAPIES SERIES
Discharge: HOME | End: 2020-12-07
Payer: MEDICARE

## 2020-12-07 PROCEDURE — 97112 NEUROMUSCULAR REEDUCATION: CPT

## 2020-12-07 PROCEDURE — 97110 THERAPEUTIC EXERCISES: CPT

## 2020-12-07 NOTE — FLOWSHEET NOTE
Physical Therapy Daily Treatment Note  Date:  2020    Patient Name:  King Callejas    :  1934  MRN: [id number]    Restrictions/Precautions:  Restrictions/Precautions  Restrictions/Precautions: Fall Risk(minimal)  Pertinent Medical History:      Medical/Treatment Diagnosis Information:  · Diagnosis: Other closed fracture of distal end of radius, initial encounter (S52.464R)  · Treatment Diagnosis: Decreased ADL status    Insurance/Certification information:  PT Insurance Information: Medicare  Physician Information:  Referring Practitioner: Dr. Gregory [facility] of care signed (Y/N):  Sent to in basket on 10/28/20      Visit# / total visits:    Pain level: /10     Functional Outcomes Measure:  Test:  Higinio Tenaod  Score: 10/28/20 54.5% dysfunction; 20 23% dysfunction    Progress Note: []  Yes  []  No  Next due by: Visit #10      History of Injury:Pt broke her L forearm . Since the fracture pt has had limited hand use, stiff fingers and wrist, pain (3/10). Pt has pain sometimes at rest, sometimes with a change in arm positions. Pt stated she cannot /hold items with the L hand- \"I don't have anything in here to hold with it. \"  Pt had L \"arm surgery\" for the forearm- \"these 2 bones were real bad, they were sticking out\" and pointed to radius and ulna. Pt had home therapy for walking and for L UE use. Pt has been using her cane regularly. Pt stated she has numbness of the L 4th and 5th digits. Subjective:     11/3/20 doing no better, no worse  20 progress is \"slow\". Pt stated she is improving in that she is able to use her L hand more while showering, \"I can  my bar of soap now. \"  Pt can use L hand for kitchen duties, but cannot lift anything with the L hand. Pt stated L wrist pain is improving and rated it 0/10. Pt has 5th digit numbness at the ulnar border.   Pt stated the 5th digit has minimal pain and it limits her function more than the rest of the wrist  Flexion  Pronation  Flexion   10x  5x  10x    Dumbbell  Wrist extension  Wrist flexion  Wrist RD  Biceps curls- palm up  Biceps curls- palm down  Pron/supination   1#, 15x, 2# 15x  1#, 15x, 2# 15x  1#, 15x, 2# 15x  1#, 10x; 2# 12x, 3# 10x  1#, 10x1  1#, 15x, 2# 15x    AAROM digit 5 add 10x         Isometric digit 4 and 5  MCP flexion  Isometric digit 2,3,4, 5 ADD   10x  5x each    Open/close hand  AROM MCP flexion/ext 10x  10x, with min A      Other Therapeutic Activities:      Home Exercise Program:    10/28/20  Pt brought her HEP which included use of theraputty (pink), flexion of DIPs and PIPs, making a fist, thumb/finger opposition, finger ABD/ADD, elbow flex/ext, wand press overhead  11/3/20 AROM supination/pronation    Manual Treatments:     Modalities:     Progression Towards Functional goals:  [x] Patient is progressing as expected towards functional goals listed. [] Progression is slowed due to complexities listed. [] Progression has been slowed due to co-morbidities. [] Plan just implemented, too soon to assess goals progression  [] Other:    Charges: Therapeutic Exercise:  [x] (22294) Provided verbal/tactile cueing for activities to restore or maintain strength, flexibility, endurance, ROM for improvements with self-care, mobility, lifting and ambulation. Neuromuscular Re-Education  [] (62315) Provided verbal/tactile cueing for activities to restore or maintain balance, coordination, kinesthetic sense, posture, motor skill, proprioception for self-care, mobility, lifting, and ambulation. Therapeutic Activities:    [] (91545) Provided verbal/tactile cueing to address functional limitations related to loss of mobility, strength, balance, and coordination.      Gait Training:  [] (57500) Provided training and instruction to the patient for proper postural muscle recruitment and positioning with ambulation re-education     Home Exercise Program:    [] (90272) Reviewed/Progressed HEP activities related to strengthening, flexibility, endurance, ROM for functional self-care, mobility, lifting and ambulation   [] (16558) Reviewed/Progressed HEP activities related to improving balance, coordination, kinesthetic sense, posture, motor skill, proprioception for self-care, mobility, lifting, and ambulation      Manual Treatments:  MFR / STM / Oscillations-Mobs:  G-I, II, III, IV / Manipulation / MLD  [] (74430) Provided manual therapy to mobilize  soft tissue/joints/fluid for the purpose of modulating pain, promoting relaxation, increasing ROM, reducing/eliminating soft tissue swelling/inflammation/restriction, improving soft tissue extensibility and allowing for proper ROM for normal function with self- care, mobility, lifting and ambulation. Timed Code Treatment Minutes: 42   Total Treatment Minutes: 42     [] EVAL (LOW) 87879   [] EVAL (MOD) 58734   [] EVAL (HIGH) 46367   [] RE-EVAL   [x] TE (15957) x   2  [] Aquatic (31008) x  [x] NMR (24227)   x  [] Aquatic Group (96000) x  [] Manual (20107) x    [] Ultrasound (84024) x  [] TA (85376) x  [] Mech Traction (06410)  [] Ionto (14311)           [] ES (un) (74635):   [] Vasopump (13342) [] Other:      Assessment  [] Patient tolerated treatment well [] Patient limited by fatigue  [] Patient limited by pain  [] Patient limited by other medical complications  [] Other:     Prognosis: [] Good [] Fair  [] Poor    Goals:       Long term goals  Time Frame for Long term goals : 6 weeks  Long term goal 1: Pt will rate L forearm pain 0-2/10. Long term goal 2: Pt will be able to use L hand to button her shirt. Long term goal 3: Pt will be able to cut food. Long term goal 4: Pt will be independent with HEP.   Patient Goals   Patient goals : \"I want to be able to use my hand\" including with eating, cutting, getting dressed (using buttons)              Patient Requires Follow-up: [] Yes  [] No    Plan:   [x] Continue per plan of care [] Alter current plan (see comments)  [] Plan of care initiated [] Hold pending MD visit [] Discharge  Note: If patient does not return for scheduled/ recommended follow up visits, this note will serve as a discharge from care along with most recent update on progress.     Plan for Next Session:  Reassess on 12/14/20    Electronically signed by:  Cleaster Gilford, PT

## 2020-12-09 ENCOUNTER — HOSPITAL ENCOUNTER (OUTPATIENT)
Dept: PHYSICAL THERAPY | Age: 85
Setting detail: THERAPIES SERIES
Discharge: HOME | End: 2020-12-09
Payer: MEDICARE

## 2020-12-09 PROCEDURE — 97112 NEUROMUSCULAR REEDUCATION: CPT

## 2020-12-09 PROCEDURE — 97110 THERAPEUTIC EXERCISES: CPT

## 2020-12-09 NOTE — FLOWSHEET NOTE
Physical Therapy Daily Treatment Note  Date:  2020    Patient Name:  David Mera    :  1934  MRN: [id number]    Restrictions/Precautions:  Restrictions/Precautions  Restrictions/Precautions: Fall Risk(minimal)  Pertinent Medical History:      Medical/Treatment Diagnosis Information:  · Diagnosis: Other closed fracture of distal end of radius, initial encounter (S52.265Q)  · Treatment Diagnosis: Decreased ADL status    Insurance/Certification information:  PT Insurance Information: Medicare  Physician Information:  Referring Practitioner: Dr. Peggy Polo of care signed (Y/N):  Sent to in basket on 10/28/20      Visit# / total visits:    Pain level: /10     Functional Outcomes Measure:  Test:  Juana Jacome  Score: 10/28/20 54.5% dysfunction; 20 23% dysfunction    Progress Note: []  Yes  []  No  Next due by: Visit #10      History of Injury:Pt broke her L forearm . Since the fracture pt has had limited hand use, stiff fingers and wrist, pain (3/10). Pt has pain sometimes at rest, sometimes with a change in arm positions. Pt stated she cannot /hold items with the L hand- \"I don't have anything in here to hold with it. \"  Pt had L \"arm surgery\" for the forearm- \"these 2 bones were real bad, they were sticking out\" and pointed to radius and ulna. Pt had home therapy for walking and for L UE use. Pt has been using her cane regularly. Pt stated she has numbness of the L 4th and 5th digits. Subjective:     11/3/20 doing no better, no worse  20 progress is \"slow\". Pt stated she is improving in that she is able to use her L hand more while showering, \"I can  my bar of soap now. \"  Pt can use L hand for kitchen duties, but cannot lift anything with the L hand. Pt stated L wrist pain is improving and rated it 0/10. Pt has 5th digit numbness at the ulnar border.   Pt stated the 5th digit has minimal pain and it limits her function more than the rest of the wrist/hand. 11/17/20 wrist is doing well. C/o problems with digits 4 and 5- \"they don't do nothing\" and \"they get real cold\" and \"they hurt\"  11/19/20 pt stated she saw Dr. Emily Mata. Wrist is healing well. 11/24/20 pt is using the L arm to do more activities at home, but has continued difficulty with the pinky  11/30/20 numbness of the ulnar side of the hand is decreasing.   12/9/20  Doing well, still has difficulty controlling pinky    Objective:   Observation:    Test measurements:     L wrist AROM  10/28/20  11/3/20  11/12/20  11/24/20  12/9/20    Flexion  60  NT  70  70  70    extension  18 (PROM 21)  30 (PROM 30)  35  45  45    RD  10 (PROM 11)  18 (PROM 20)  27  27  27    UD  25  30 (PROM 30)  30  32  34    Pronation  50  55  55  68  60    Supination  75  80  80  80  80    Shoulder flexion  130    130 (R 130 as well)    130 (R 130 as well)    L wrist strength              Flexion  4+/5    4+/5  5/5  5/5    Extension  4+/5    4+/5  5/5  5/5    RD  4/5    4+/5  5/5  5/5    UD  4/5    4+/5  5/5  5/5    Pronators  4+/5    4+/5  4+/5  5/5    Supinators  4/5    4+/5  4+/5  4+/5    Biceps  4+/5    5/5  5/5  5/5    Triceps  4+/5    5/5  5/5  5/5    Shoulder flexion  4-/5    4+/5  4+/5  4+/5    Shoulder ABD  4-/5    4/5  4/5  4/5    Finger flexion digits 2 and 3  4/5    4+/5  4+/5  4+/5    Finger flexion digit 4  3+/5    4/5  4/5  4/5    Finger flexion digit 5  3-/5    3+/5  3+/5  3+/5    Dynamometer (lbs)  L 2, R 60    L 15, 20, 12 pounds  13  19, 11, 12        Exercises:  Exercise/Equipment Resistance/Repetitions Other comments   PROM L wrist 10x    PROM L sup/pronation 10x    [place] from floor 4-way x10 ea    Roll ball on table flexion x30    Roll ball on table hor add x30    Roll red ball on wall     Digiflex Red 20x  Green 20x    T-slide  Rows  Triceps curls  Shoulder extension  Shoulder adduction  Biceps curls   Yellow 20x  Yellow 10x2  Yellow 20x  Yellow 20x  Yellow 10x2    AAROM wrist  RD, EXT 10x each    AAROM forearm pronation 10x     Isometric wrist  Flexion  Pronation  Flexion   10x  5x  10x    Dumbbell  Wrist extension  Wrist flexion  Wrist RD  Biceps curls- palm up  Biceps curls- palm down  Pron/supination   1#, 15x, 2# 15x  1#, 15x, 2# 15x  1#, 15x, 2# 15x  1#, 10x; 2# 12x, 3# 10x  1#, 10x1  1#, 15x, 2# 15x    AAROM digit 5 add 10x         Isometric digit 4 and 5  MCP flexion  Isometric digit 2,3,4, 5 ADD   10x  5x each    Open/close hand  AROM MCP flexion/ext 10x  10x, with min A      Other Therapeutic Activities:      Home Exercise Program:    10/28/20  Pt brought her HEP which included use of theraputty (pink), flexion of DIPs and PIPs, making a fist, thumb/finger opposition, finger ABD/ADD, elbow flex/ext, wand press overhead  11/3/20 AROM supination/pronation    Manual Treatments:     Modalities:     Progression Towards Functional goals:  [x] Patient is progressing as expected towards functional goals listed. [] Progression is slowed due to complexities listed. [] Progression has been slowed due to co-morbidities. [] Plan just implemented, too soon to assess goals progression  [] Other:    Charges: Therapeutic Exercise:  [x] (48024) Provided verbal/tactile cueing for activities to restore or maintain strength, flexibility, endurance, ROM for improvements with self-care, mobility, lifting and ambulation. Neuromuscular Re-Education  [] (95789) Provided verbal/tactile cueing for activities to restore or maintain balance, coordination, kinesthetic sense, posture, motor skill, proprioception for self-care, mobility, lifting, and ambulation. Therapeutic Activities:    [] (14274) Provided verbal/tactile cueing to address functional limitations related to loss of mobility, strength, balance, and coordination.      Gait Training:  [] (06544) Provided training and instruction to the patient for proper postural muscle recruitment and positioning with ambulation re-education     Home Exercise Program:    [] (61483) Reviewed/Progressed HEP activities related to strengthening, flexibility, endurance, ROM for functional self-care, mobility, lifting and ambulation   [] (16215) Reviewed/Progressed HEP activities related to improving balance, coordination, kinesthetic sense, posture, motor skill, proprioception for self-care, mobility, lifting, and ambulation      Manual Treatments:  MFR / STM / Oscillations-Mobs:  G-I, II, III, IV / Manipulation / MLD  [] (65513) Provided manual therapy to mobilize  soft tissue/joints/fluid for the purpose of modulating pain, promoting relaxation, increasing ROM, reducing/eliminating soft tissue swelling/inflammation/restriction, improving soft tissue extensibility and allowing for proper ROM for normal function with self- care, mobility, lifting and ambulation. Timed Code Treatment Minutes: 42   Total Treatment Minutes: 42     [] EVAL (LOW) 92813   [] EVAL (MOD) 83374   [] EVAL (HIGH) 35275   [] RE-EVAL   [x] TE (60636) x   2  [] Aquatic (46082) x  [x] NMR (21533)   x  [] Aquatic Group (25102) x  [] Manual (40040) x    [] Ultrasound (42214) x  [] TA (18875) x  [] Mech Traction (75257)  [] Ionto (68425)           [] ES (un) (67230):   [] Vasopump (24804) [] Other:      Assessment  [] Patient tolerated treatment well [] Patient limited by fatigue  [] Patient limited by pain  [] Patient limited by other medical complications  [] Other:     Prognosis: [] Good [] Fair  [] Poor    Goals:       Long term goals  Time Frame for Long term goals : 6 weeks  Long term goal 1: Pt will rate L forearm pain 0-2/10. Long term goal 2: Pt will be able to use L hand to button her shirt. Long term goal 3: Pt will be able to cut food. Long term goal 4: Pt will be independent with HEP.   Patient Goals   Patient goals : \"I want to be able to use my hand\" including with eating, cutting, getting dressed (using buttons)              Patient Requires Follow-up: [] Yes  [] No    Plan:   [x] Continue per plan of care [] Alter current plan (see comments)  [] Plan of care initiated [] Hold pending MD visit [] Discharge  Note: If patient does not return for scheduled/ recommended follow up visits, this note will serve as a discharge from care along with most recent update on progress.     Plan for Next Session:  Reassess on 12/14/20    Electronically signed by:  Ezequiel Cooley PT

## 2020-12-14 ENCOUNTER — HOSPITAL ENCOUNTER (OUTPATIENT)
Dept: PHYSICAL THERAPY | Age: 85
Setting detail: THERAPIES SERIES
Discharge: HOME | End: 2020-12-14
Payer: MEDICARE

## 2020-12-14 PROCEDURE — 97112 NEUROMUSCULAR REEDUCATION: CPT

## 2020-12-14 PROCEDURE — 97110 THERAPEUTIC EXERCISES: CPT

## 2020-12-14 NOTE — FLOWSHEET NOTE
Physical Therapy Daily Treatment Note  Date:  2020    Patient Name:  George Funes    :  1934  MRN: [id number]    Restrictions/Precautions:  Restrictions/Precautions  Restrictions/Precautions: Fall Risk(minimal)  Pertinent Medical History:      Medical/Treatment Diagnosis Information:  · Diagnosis: Other closed fracture of distal end of radius, initial encounter (S52.597P)  · Treatment Diagnosis: Decreased ADL status    Insurance/Certification information:  PT Insurance Information: Medicare  Physician Information:  Referring Practitioner: Dr. Chey Adame of care signed (Y/N):  Sent to in basket on 10/28/20      Visit# / total visits:    Pain level: /10     Functional Outcomes Measure:  Test:  Aury Crane  Score: 10/28/20 54.5% dysfunction; 20 23% dysfunction    Progress Note: []  Yes  []  No  Next due by: Visit #10      History of Injury:Pt broke her L forearm . Since the fracture pt has had limited hand use, stiff fingers and wrist, pain (3/10). Pt has pain sometimes at rest, sometimes with a change in arm positions. Pt stated she cannot /hold items with the L hand- \"I don't have anything in here to hold with it. \"  Pt had L \"arm surgery\" for the forearm- \"these 2 bones were real bad, they were sticking out\" and pointed to radius and ulna. Pt had home therapy for walking and for L UE use. Pt has been using her cane regularly. Pt stated she has numbness of the L 4th and 5th digits. Subjective:     11/3/20 doing no better, no worse  20 progress is \"slow\". Pt stated she is improving in that she is able to use her L hand more while showering, \"I can  my bar of soap now. \"  Pt can use L hand for kitchen duties, but cannot lift anything with the L hand. Pt stated L wrist pain is improving and rated it 0/10. Pt has 5th digit numbness at the ulnar border.   Pt stated the 5th digit has minimal pain and it limits her function more than the rest of the wrist/hand. 11/17/20 wrist is doing well. C/o problems with digits 4 and 5- \"they don't do nothing\" and \"they get real cold\" and \"they hurt\"  11/19/20 pt stated she saw Dr. Kaleigh Silveira. Wrist is healing well. 11/24/20 pt is using the L arm to do more activities at home, but has continued difficulty with the pinky  11/30/20 numbness of the ulnar side of the hand is decreasing.   12/9/20  Doing well, still has difficulty controlling pinky    Objective:   Observation:    Test measurements:     L wrist AROM  10/28/20  11/3/20  11/12/20  11/24/20  12/9/20    Flexion  60  NT  70  70  70    extension  18 (PROM 21)  30 (PROM 30)  35  45  45    RD  10 (PROM 11)  18 (PROM 20)  27  27  27    UD  25  30 (PROM 30)  30  32  34    Pronation  50  55  55  68  60    Supination  75  80  80  80  80    Shoulder flexion  130    130 (R 130 as well)    130 (R 130 as well)    L wrist strength              Flexion  4+/5    4+/5  5/5  5/5    Extension  4+/5    4+/5  5/5  5/5    RD  4/5    4+/5  5/5  5/5    UD  4/5    4+/5  5/5  5/5    Pronators  4+/5    4+/5  4+/5  5/5    Supinators  4/5    4+/5  4+/5  4+/5    Biceps  4+/5    5/5  5/5  5/5    Triceps  4+/5    5/5  5/5  5/5    Shoulder flexion  4-/5    4+/5  4+/5  4+/5    Shoulder ABD  4-/5    4/5  4/5  4/5    Finger flexion digits 2 and 3  4/5    4+/5  4+/5  4+/5    Finger flexion digit 4  3+/5    4/5  4/5  4/5    Finger flexion digit 5  3-/5    3+/5  3+/5  3+/5    Dynamometer (lbs)  L 2, R 60    L 15, 20, 12 pounds  13  19, 11, 12        Exercises:  Exercise/Equipment Resistance/Repetitions Other comments   PROM L wrist 10x    PROM L sup/pronation 10x    [place] from floor 4-way x10 ea    Roll ball on table flexion x30    Roll ball on table hor add x30    Roll red ball on wall     Digiflex Red 20x  Green 20x    T-slide  Rows  Triceps curls  Shoulder 20y  s/p  PPD #1 of a viable male infant.   Post-partum CBC reviewed, shows anemia. Patient asymptomatic at this time. Will start on PO Iron + VItC  VSS. extension  Shoulder adduction  Biceps curls   Yellow 20x  Yellow 10x2  Yellow 20x  Yellow 20x  Yellow 10x2    AAROM wrist  RD, EXT 10x each    AAROM forearm pronation 10x     Isometric wrist  Flexion  Pronation  Flexion   10x  5x  10x    Dumbbell  Wrist extension  Wrist flexion  Wrist RD  Biceps curls- palm up  Biceps curls- palm down  Pron/supination   1#, 15x, 2# 15x  1#, 15x, 2# 15x  1#, 15x, 2# 15x  1#, 10x; 2# 12x, 3# 10x  1#, 10x1  1#, 15x, 2# 15x    AAROM digit 5 add 10x         Isometric digit 4 and 5  MCP flexion  Isometric digit 2,3,4, 5 ADD   10x  5x each    Open/close hand  AROM MCP flexion/ext 10x  10x, with min A      Other Therapeutic Activities:      Home Exercise Program:    10/28/20  Pt brought her HEP which included use of theraputty (pink), flexion of DIPs and PIPs, making a fist, thumb/finger opposition, finger ABD/ADD, elbow flex/ext, wand press overhead  11/3/20 AROM supination/pronation    Manual Treatments:     Modalities:     Progression Towards Functional goals:  [x] Patient is progressing as expected towards functional goals listed. [] Progression is slowed due to complexities listed. [] Progression has been slowed due to co-morbidities. [] Plan just implemented, too soon to assess goals progression  [] Other:    Charges: Therapeutic Exercise:  [x] (90946) Provided verbal/tactile cueing for activities to restore or maintain strength, flexibility, endurance, ROM for improvements with self-care, mobility, lifting and ambulation. Neuromuscular Re-Education  [] (67713) Provided verbal/tactile cueing for activities to restore or maintain balance, coordination, kinesthetic sense, posture, motor skill, proprioception for self-care, mobility, lifting, and ambulation. Therapeutic Activities:    [] (83119) Provided verbal/tactile cueing to address functional limitations related to loss of mobility, strength, balance, and coordination.      Gait Training:  [] (34378) Provided training including with eating, cutting, getting dressed (using buttons)              Patient Requires Follow-up: [] Yes  [] No    Plan:   [x] Continue per plan of care [] Alter current plan (see comments)  [] Plan of care initiated [] Hold pending MD visit [] Discharge  Note: If patient does not return for scheduled/ recommended follow up visits, this note will serve as a discharge from care along with most recent update on progress.     Plan for Next Session:  Treat 1 more session    Electronically signed by:  Sunny De Anda PT

## 2020-12-15 ENCOUNTER — TELEPHONE (OUTPATIENT)
Dept: FAMILY MEDICINE CLINIC | Age: 85
End: 2020-12-15

## 2020-12-15 NOTE — TELEPHONE ENCOUNTER
Pt fell and has surgery on her arm and would like to talk w/ Dr. Miguel Grove about this, she feels like he is needing to know what is going on. Pl advise.    [phone]

## 2020-12-16 ENCOUNTER — HOSPITAL ENCOUNTER (OUTPATIENT)
Dept: PHYSICAL THERAPY | Age: 85
Setting detail: THERAPIES SERIES
Discharge: HOME | End: 2020-12-16
Payer: MEDICARE

## 2020-12-16 PROCEDURE — 97112 NEUROMUSCULAR REEDUCATION: CPT

## 2020-12-16 PROCEDURE — 97110 THERAPEUTIC EXERCISES: CPT

## 2020-12-16 NOTE — PROGRESS NOTES
Outpatient Physical Therapy  [] [facility]    Phone: [phone]   Fax: [phone]   [] [facility]  Phone: [phone]   Fax: [phone]  [x] [facility]              Phone: [phone]   Fax: [phone]     Physical Therapy Discharge Note  Date: 2020        Patient Name: Kam Larkin                          :  1934                   MRN: [id number]     Restrictions/Precautions:  Restrictions/Precautions  Restrictions/Precautions: Fall Risk(minimal)  Pertinent Medical History:       Medical/Treatment Diagnosis Information:  · Diagnosis: Other closed fracture of distal end of radius, initial encounter (S52.677O)  · Treatment Diagnosis: Decreased ADL status     Insurance/Certification information:  PT Insurance Information: Medicare  Physician Information:  Referring Practitioner: Dr. Joel Man  care signed (Y/N):  Sent to in basket on 10/28/20      Visit# / total visits:  14  Pain level:      /10            Functional Outcomes Measure:  Test:  Leatha Coulter  Score: 10/28/20 54.5% dysfunction; 20 23% dysfunction   Time Period for Report:  10/28/20-20  Cancels/No-shows to date:  0    Plan of Care/Treatment to date:  [x] Therapeutic Exercise    [x] Modalities:  [x] Therapeutic Activity     [] Ultrasound  [] Electrical Stimulation  [] Gait Training      [] Cervical Traction    [] Lumbar Traction  [x] Neuromuscular Re-education  [] Cold/hotpack [] Iontophoresis  [x] Instruction in HEP      Other:  [x] Manual Therapy       []    [] Aquatic Therapy       []                          Significant Findings At Last Visit/Comments:    Subjective:     20 pt is using the L arm to do more activities at home, but has continued difficulty with the pinky  20 numbness of the ulnar side of the hand is decreasing. 20  Doing well, still has difficulty controlling pinky  20: Pinky is the same.  Lacking control      Objective:   Observation:   Test measurements: L wrist AROM 10/28/20 12/9/20   Flexion 60 70   extension 18 (PROM 21) 45   RD 10 (PROM 11) 27   UD 25 34   Pronation 50 60   Supination 75 80   Shoulder flexion 130 130 (R 130 as well)   L wrist strength       Flexion 4+/5 5/5   Extension 4+/5 5/5   RD 4/5 5/5   UD 4/5 5/5   Pronators 4+/5 5/5   Supinators 4/5 4+/5   Biceps 4+/5 5/5   Triceps 4+/5 5/5   Shoulder flexion 4-/5 4+/5   Shoulder ABD 4-/5 4/5   Finger flexion digits 2 and 3 4/5 4+/5   Finger flexion digit 4 3+/5 4/5   Finger flexion digit 5 3-/5 3+/5   Dynamometer (lbs) L 2, R 60 19, 11, 12   ?     L 5th digit adductors 3-/5     Assessment:   Summary: Function, ROM, strength, pain all improved    Progression Towards Functional goals:  [x] Patient is progressing as expected towards functional goals listed. [] Progression is slowed due to complexities listed. [] Progression has been slowed due to co-morbidities. [] Plan just implemented, too soon to assess goals progression  [] Other:    Goals: met all goals  Long term goals  Time Frame for Long term goals : 6 weeks  Long term goal 1: Pt will rate L forearm pain 0-2/10. Long term goal 2: Pt will be able to use L hand to button her shirt. Long term goal 3: Pt will be able to cut food. Long term goal 4: Pt will be independent with HEP. Patient Goals   Patient goals : \"I want to be able to use my hand\" including with eating, cutting, getting dressed (using buttons)    Rehab Potential: [] Excellent [x] Good [] Fair  [] Poor     Goal Status:  [x] Achieved [] Partially Achieved  [] Not Achieved     Patient Status: [x] Patient now discharged         Electronically signed by:  Toña Blanchard PT    If you have any questions or concerns, please don't hesitate to call.   Thank you for your referral.

## 2020-12-16 NOTE — FLOWSHEET NOTE
Physical Therapy Daily Treatment Note  Date:  2020    Patient Name:  George Funes    :  1934  MRN: [id number]    Restrictions/Precautions:  Restrictions/Precautions  Restrictions/Precautions: Fall Risk(minimal)  Pertinent Medical History:      Medical/Treatment Diagnosis Information:  · Diagnosis: Other closed fracture of distal end of radius, initial encounter (S52.392U)  · Treatment Diagnosis: Decreased ADL status    Insurance/Certification information:  PT Insurance Information: Medicare  Physician Information:  Referring Practitioner: Dr. Chey Adame of care signed (Y/N):  Sent to in basket on 10/28/20      Visit# / total visits:    Pain level: /10     Functional Outcomes Measure:  Test:  Aury Callaway  Score: 10/28/20 54.5% dysfunction; 20 23% dysfunction    Progress Note: []  Yes  []  No  Next due by: Visit #10      History of Injury:Pt broke her L forearm . Since the fracture pt has had limited hand use, stiff fingers and wrist, pain (3/10). Pt has pain sometimes at rest, sometimes with a change in arm positions. Pt stated she cannot /hold items with the L hand- \"I don't have anything in here to hold with it. \"  Pt had L \"arm surgery\" for the forearm- \"these 2 bones were real bad, they were sticking out\" and pointed to radius and ulna. Pt had home therapy for walking and for L UE use. Pt has been using her cane regularly. Pt stated she has numbness of the L 4th and 5th digits. Subjective:     11/3/20 doing no better, no worse  20 progress is \"slow\". Pt stated she is improving in that she is able to use her L hand more while showering, \"I can  my bar of soap now. \"  Pt can use L hand for kitchen duties, but cannot lift anything with the L hand. Pt stated L wrist pain is improving and rated it 0/10. Pt has 5th digit numbness at the ulnar border.   Pt stated the 5th digit has minimal pain and it limits her function more than the rest of the wrist/hand. 11/17/20 wrist is doing well. C/o problems with digits 4 and 5- \"they don't do nothing\" and \"they get real cold\" and \"they hurt\"  11/19/20 pt stated she saw Dr. Avery Casas. Wrist is healing well. 11/24/20 pt is using the L arm to do more activities at home, but has continued difficulty with the pinky  11/30/20 numbness of the ulnar side of the hand is decreasing. 12/9/20  Doing well, still has difficulty controlling pinky  12/16/20: Pinky is the same.  Lacking control    Objective:   Observation:    Test measurements:     L wrist AROM  10/28/20  11/3/20  11/12/20  11/24/20  12/9/20    Flexion  60  NT  70  70  70    extension  18 (PROM 21)  30 (PROM 30)  35  45  45    RD  10 (PROM 11)  18 (PROM 20)  27  27  27    UD  25  30 (PROM 30)  30  32  34    Pronation  50  55  55  68  60    Supination  75  80  80  80  80    Shoulder flexion  130    130 (R 130 as well)    130 (R 130 as well)    L wrist strength              Flexion  4+/5    4+/5  5/5  5/5    Extension  4+/5    4+/5  5/5  5/5    RD  4/5    4+/5  5/5  5/5    UD  4/5    4+/5  5/5  5/5    Pronators  4+/5    4+/5  4+/5  5/5    Supinators  4/5    4+/5  4+/5  4+/5    Biceps  4+/5    5/5  5/5  5/5    Triceps  4+/5    5/5  5/5  5/5    Shoulder flexion  4-/5    4+/5  4+/5  4+/5    Shoulder ABD  4-/5    4/5  4/5  4/5    Finger flexion digits 2 and 3  4/5    4+/5  4+/5  4+/5    Finger flexion digit 4  3+/5    4/5  4/5  4/5    Finger flexion digit 5  3-/5    3+/5  3+/5  3+/5    Dynamometer (lbs)  L 2, R 60    L 15, 20, 12 pounds  13  19, 11, 12        Exercises:  Exercise/Equipment Resistance/Repetitions Other comments   PROM L wrist 10x    PROM L sup/pronation 10x    [place] from floor 4-way x10 ea    Roll ball on table flexion x30    Roll ball on table hor add x30    Roll red ball on wall     Digiflex Red 20x  Green 20x Training:  [] (33445) Provided training and instruction to the patient for proper postural muscle recruitment and positioning with ambulation re-education     Home Exercise Program:    [] (48116) Reviewed/Progressed HEP activities related to strengthening, flexibility, endurance, ROM for functional self-care, mobility, lifting and ambulation   [] (35578) Reviewed/Progressed HEP activities related to improving balance, coordination, kinesthetic sense, posture, motor skill, proprioception for self-care, mobility, lifting, and ambulation      Manual Treatments:  MFR / STM / Oscillations-Mobs:  G-I, II, III, IV / Manipulation / MLD  [] (65094) Provided manual therapy to mobilize  soft tissue/joints/fluid for the purpose of modulating pain, promoting relaxation, increasing ROM, reducing/eliminating soft tissue swelling/inflammation/restriction, improving soft tissue extensibility and allowing for proper ROM for normal function with self- care, mobility, lifting and ambulation. Timed Code Treatment Minutes: 40   Total Treatment Minutes: 42     [] EVAL (LOW) 81673   [] EVAL (MOD) 19020   [] EVAL (HIGH) 33402   [] RE-EVAL   [x] TE (73511) x     [] Aquatic (41389) x  [x] NMR (65742)   x  [] Aquatic Group (17633) x  [] Manual (05479) x    [] Ultrasound (11329) x  [] TA (24490) x  [] Mech Traction (56764)  [] Ionto (35262)           [] ES (un) (75006):   [] Vasopump (12595) [] Other:      Assessment  [] Patient tolerated treatment well [] Patient limited by fatigue  [] Patient limited by pain  [] Patient limited by other medical complications  [] Other:     Prognosis: [] Good [] Fair  [] Poor    Goals:       Long term goals  Time Frame for Long term goals : 6 weeks  Long term goal 1: Pt will rate L forearm pain 0-2/10. Long term goal 2: Pt will be able to use L hand to button her shirt. Long term goal 3: Pt will be able to cut food. Long term goal 4: Pt will be independent with HEP.   Patient Goals   Patient goals : \"I want to be able to use my hand\" including with eating, cutting, getting dressed (using buttons)              Patient Requires Follow-up: [] Yes  [] No    Plan:   [] Continue per plan of care [] Alter current plan (see comments)  [] Plan of care initiated [] Hold pending MD visit [x] Discharge  Note: If patient does not return for scheduled/ recommended follow up visits, this note will serve as a discharge from care along with most recent update on progress.     Plan for Next Session:  D/c with HEP  Electronically signed by:  Aspen Villareal PTA

## 2020-12-18 ENCOUNTER — OFFICE VISIT (OUTPATIENT)
Dept: FAMILY MEDICINE CLINIC | Age: 85
End: 2020-12-18
Payer: MEDICARE

## 2020-12-18 VITALS
HEART RATE: 80 BPM | TEMPERATURE: 98 F | WEIGHT: 135 LBS | SYSTOLIC BLOOD PRESSURE: 140 MMHG | OXYGEN SATURATION: 98 % | BODY MASS INDEX: 20.53 KG/M2 | DIASTOLIC BLOOD PRESSURE: 76 MMHG

## 2020-12-18 PROBLEM — I15.8 OTHER SECONDARY HYPERTENSION: Status: ACTIVE | Noted: 2020-12-18

## 2020-12-18 PROCEDURE — 1123F ACP DISCUSS/DSCN MKR DOCD: CPT | Performed by: INTERNAL MEDICINE

## 2020-12-18 PROCEDURE — G8484 FLU IMMUNIZE NO ADMIN: HCPCS | Performed by: INTERNAL MEDICINE

## 2020-12-18 PROCEDURE — 1036F TOBACCO NON-USER: CPT | Performed by: INTERNAL MEDICINE

## 2020-12-18 PROCEDURE — G8420 CALC BMI NORM PARAMETERS: HCPCS | Performed by: INTERNAL MEDICINE

## 2020-12-18 PROCEDURE — 99214 OFFICE O/P EST MOD 30 MIN: CPT | Performed by: INTERNAL MEDICINE

## 2020-12-18 PROCEDURE — 4040F PNEUMOC VAC/ADMIN/RCVD: CPT | Performed by: INTERNAL MEDICINE

## 2020-12-18 PROCEDURE — 1090F PRES/ABSN URINE INCON ASSESS: CPT | Performed by: INTERNAL MEDICINE

## 2020-12-18 PROCEDURE — G8427 DOCREV CUR MEDS BY ELIG CLIN: HCPCS | Performed by: INTERNAL MEDICINE

## 2020-12-18 ASSESSMENT — ENCOUNTER SYMPTOMS: RESPIRATORY NEGATIVE: 1

## 2020-12-18 ASSESSMENT — PATIENT HEALTH QUESTIONNAIRE - PHQ9: DEPRESSION UNABLE TO ASSESS: URGENT/EMERGENT SITUATION

## 2020-12-18 NOTE — PROGRESS NOTES
Subjective:      Patient ID: Nimo Smith is a 80 y.o. female. Patient presents with:  Hypertension: bp up and down. physical therapist thinks she may have Parkinson's because she doesn't move her arms when she walks. Nimo Smith is a 80 y.o. female with the following history as recorded in [employer]:  Patient Active Problem List    Closed fracture of left distal radius      GERD (gastroesophageal reflux disease)      Hyperlipidemia      Current Outpatient Medications:    aspirin 325 MG tablet, Take 325 mg by mouth, Disp: , Rfl:     No current facility-administered medications for this visit. Allergies: Levofloxacin  Past Medical History:  No date: Allergic rhinitis  No date: GERD (gastroesophageal reflux disease)  No date: Hyperlipidemia  06/23/2016: Laceration of head  Past Surgical History:  No date: APPENDECTOMY  4-2011: COLONOSCOPY  8/10/2020: FOREARM SURGERY; Left      Comment:  OPEN REDUCTION INTERNAL FIXATION LEFT DISTAL RADIUS with               mini c-arm performed by Charu Mckinney MD at [place] [address]  8/2010: FRACTURE SURGERY      Comment:  left Ulna  No date: KNEE SURGERY      Comment:  left torn meniscus  Review of patient's family history indicates:  Problem: Arthritis      Relation: Mother          Age of Onset: (Not Specified)  Problem: Heart Attack      Relation: Father          Age of Onset: (Not Specified)    Social History    Tobacco Use      Smoking status: Never Smoker      Smokeless tobacco: Former User    Alcohol use: No      Hypertension  This is a new problem. The current episode started more than 1 month ago. The problem has been resolved since onset. The problem is controlled. There are no associated agents to hypertension. There are no known risk factors for coronary artery disease. Past treatments include nothing. The current treatment provides significant improvement. There are no compliance problems. There is no history of chronic renal disease.        Review of Systems Constitutional: Negative for appetite change and fever. HENT: Negative. Respiratory: Negative. Cardiovascular: Negative. Musculoskeletal: Positive for arthralgias and gait problem. Psychiatric/Behavioral: Negative for sleep disturbance. Objective:   Physical Exam  Constitutional:       General: She is not in acute distress. Appearance: She is well-developed. She is not diaphoretic. HENT:      Head: Normocephalic and atraumatic. Right Ear: External ear normal.      Left Ear: External ear normal.      Nose: Nose normal.      Mouth/Throat:      Pharynx: No oropharyngeal exudate. Eyes:      General: No scleral icterus. Right eye: No discharge. Left eye: No discharge. Conjunctiva/sclera: Conjunctivae normal.      Pupils: Pupils are equal, round, and reactive to light. Neck:      Musculoskeletal: Normal range of motion and neck supple. Thyroid: No thyromegaly. Vascular: No JVD. Trachea: No tracheal deviation. Cardiovascular:      Rate and Rhythm: Normal rate and regular rhythm. Heart sounds: Normal heart sounds. No murmur. No friction rub. No gallop. Pulmonary:      Effort: Pulmonary effort is normal. No respiratory distress. Breath sounds: Normal breath sounds. No stridor. No wheezing or rales. Chest:      Chest wall: No tenderness. Abdominal:      General: There is no distension. Palpations: Abdomen is soft. There is no mass. Tenderness: There is no abdominal tenderness. There is no guarding or rebound. Genitourinary:     Vagina: Normal. No vaginal discharge. Rectum: Guaiac result negative. Musculoskeletal: Normal range of motion. General: No tenderness. Comments: Good facial expression. No resting tremors. Walking is fine. Lymphadenopathy:      Cervical: No cervical adenopathy. Skin:     General: Skin is warm and dry. Coloration: Skin is not pale. Findings: No erythema or rash. Neurological:      Mental Status: She is alert and oriented to person, place, and time. Cranial Nerves: No cranial nerve deficit. Motor: No abnormal muscle tone. Coordination: Coordination normal.      Deep Tendon Reflexes: Reflexes are normal and symmetric. Reflexes normal.   Psychiatric:         Behavior: Behavior normal.         Thought Content: Thought content normal.         Judgment: Judgment normal.         Assessment:      Encounter Diagnoses   Name Primary?  Other secondary hypertension Yes    Closed fracture of distal end of left radius with routine healing, unspecified fracture morphology, subsequent encounter            Plan:      Renee Hollis was seen today for hypertension. Diagnoses and all orders for this visit:    Other secondary hypertension    Closed fracture of distal end of left radius with routine healing, unspecified fracture morphology, subsequent encounter      No sign of movement disorder. Blood pressure was high due to anxiety and others.         Ana Cristina José MD

## 2020-12-18 NOTE — PATIENT INSTRUCTIONS
Please call your pharmacy if you need any refills of your medication(s). Please call our office at [phone] if you don't hear from us about your test results. Bring an accurate list of your medications with you at every appointment to ensure that we have the correct information.     Our office hours are: Monday - Friday 8:30 am- 5 pm    Phone lines turn on at 8:30 am

## 2020-12-21 ENCOUNTER — APPOINTMENT (OUTPATIENT)
Dept: PHYSICAL THERAPY | Age: 85
End: 2020-12-21
Payer: MEDICARE

## 2020-12-23 ENCOUNTER — APPOINTMENT (OUTPATIENT)
Dept: PHYSICAL THERAPY | Age: 85
End: 2020-12-23
Payer: MEDICARE

## 2021-02-24 ENCOUNTER — OFFICE VISIT (OUTPATIENT)
Dept: ORTHOPEDIC SURGERY | Age: 86
End: 2021-02-24
Payer: MEDICARE

## 2021-02-24 VITALS
WEIGHT: 135 LBS | DIASTOLIC BLOOD PRESSURE: 81 MMHG | SYSTOLIC BLOOD PRESSURE: 156 MMHG | BODY MASS INDEX: 20.46 KG/M2 | TEMPERATURE: 98.1 F | HEIGHT: 68 IN

## 2021-02-24 DIAGNOSIS — S52.592A OTHER CLOSED FRACTURE OF DISTAL END OF LEFT RADIUS, INITIAL ENCOUNTER: Primary | ICD-10-CM

## 2021-02-24 PROCEDURE — 99213 OFFICE O/P EST LOW 20 MIN: CPT | Performed by: ORTHOPAEDIC SURGERY

## 2021-02-24 PROCEDURE — 4040F PNEUMOC VAC/ADMIN/RCVD: CPT | Performed by: ORTHOPAEDIC SURGERY

## 2021-02-24 PROCEDURE — G8484 FLU IMMUNIZE NO ADMIN: HCPCS | Performed by: ORTHOPAEDIC SURGERY

## 2021-02-24 PROCEDURE — G8420 CALC BMI NORM PARAMETERS: HCPCS | Performed by: ORTHOPAEDIC SURGERY

## 2021-02-24 PROCEDURE — G8427 DOCREV CUR MEDS BY ELIG CLIN: HCPCS | Performed by: ORTHOPAEDIC SURGERY

## 2021-02-24 PROCEDURE — 1090F PRES/ABSN URINE INCON ASSESS: CPT | Performed by: ORTHOPAEDIC SURGERY

## 2021-02-24 PROCEDURE — 1036F TOBACCO NON-USER: CPT | Performed by: ORTHOPAEDIC SURGERY

## 2021-02-24 PROCEDURE — 1123F ACP DISCUSS/DSCN MKR DOCD: CPT | Performed by: ORTHOPAEDIC SURGERY

## 2021-02-24 NOTE — PROGRESS NOTES
DIAGNOSIS:  Left distal radius 2 parts intra-articular displaced comminuted fracture, status post ORIF. DATE OF SURGERY:  8/10/2020. HISTORY OF PRESENT ILLNESS:  Speedy Kate 80 y.o.  female right handed returns today  for 3 months postoperative visit. The patient denies any significant pain in the left wrist. Rates pain a 0/10 VAS and is doing much better. She has some mild intermittent pain only when she is doing something heavy. She does have mild numbness and tingling sensation in her left baby finger but no numbness and tingling in the rest of her hand or fingers. No fever or Chills. She has completed PT with good improvement.       Past Medical History:   Diagnosis Date    Allergic rhinitis     GERD (gastroesophageal reflux disease)     Hyperlipidemia     Laceration of head 06/23/2016       Past Surgical History:   Procedure Laterality Date    APPENDECTOMY      COLONOSCOPY  4-2011    FOREARM SURGERY Left 8/10/2020    OPEN REDUCTION INTERNAL FIXATION LEFT DISTAL RADIUS with mini c-arm performed by Gato Duran MD at [address] [address]  8/2010    left Ulna    KNEE SURGERY      left torn meniscus       Social History     Socioeconomic History    Marital status:      Spouse name: Not on file    Number of children: Not on file    Years of education: Not on file    Highest education level: Not on file   Occupational History    Not on file   Social Needs    Financial resource strain: Not on file    Food insecurity     Worry: Not on file     Inability: Not on file    Transportation needs     Medical: Not on file     Non-medical: Not on file   Tobacco Use    Smoking status: Never Smoker    Smokeless tobacco: Former User   Substance and Sexual Activity    Alcohol use: No    Drug use: No    Sexual activity: Not on file   Lifestyle    Physical activity     Days per week: Not on file     Minutes per session: Not on file    Stress: Not on file   Relationships    Social connections     Talks on phone: Not on file     Gets together: Not on file     Attends Bahai service: Not on file     Active member of club or organization: Not on file     Attends meetings of clubs or organizations: Not on file     Relationship status: Not on file    Intimate partner violence     Fear of current or ex partner: Not on file     Emotionally abused: Not on file     Physically abused: Not on file     Forced sexual activity: Not on file   Other Topics Concern    Not on file   Social History Narrative    Not on file       Family History   Problem Relation Age of Onset    Arthritis Mother     Heart Attack Father        Current Outpatient Medications on File Prior to Visit   Medication Sig Dispense Refill    aspirin 325 MG tablet Take 325 mg by mouth       No current facility-administered medications on file prior to visit. Pertinent items are noted in HPI  Review of systems reviewed from Patient History Form dated on 10/6/2020 and available in the patient's chart under the Media tab. No change noted. PHYSICAL EXAMINATION:  Ms. Maria G Coon is a very pleasant 80 y.o.  female who presents today in no acute distress, awake, alert, and oriented. She is well dressed, nourished and  groomed. Patient with normal affect. Height is  5' 8\" (1.727 m), weight is 135 lb (61.2 kg), Body mass index is 20.53 kg/m². Resting respiratory rate is 16. The patient ambulates with a slow gait using a cane. The incision is completely healed . The wrist laceration has completely healed. No signs of any erythema or drainage. She  has no pain with the active or passive range of motion of the left wrist, minimally decrease ROM with supination and in her fourth and fifth fingers DIP and PIP joints. She is able to make a full fist.  She  has intact sensation, distally, and she  is neurovascularly intact. Good strength, and no instability both upper and lower extremities.       IMAGING:  Three views left wrist taken today in the office showed anatomic alignment of left distal radius, plate and screws in good position, no loosening. There is an ulnar plate and screws from prior injury. There is a  distal ulnar fracture, distal from the hardware, with callus. IMPRESSION: 6 months out from left distal radius ORIF and doing very well. PLAN:  I have told the patient to work on ROM, as well as strengthening exercises. She can go back to normal activity with no restrictions. I told the patient that it is not unusual to have some achy pain and swelling for up to a year after a fracture. The patient will come back for a follow up as needed if her numbness and tingling do not improve. As this patient has demonstrated risk factors for osteoporosis, such as age and evidence of a fracture, I have referred the patient back to the primary care physician for evaluation for osteoporosis, including consideration for DEXA scanning, if this is felt to be clinically indicated. The patient is advised to contact the primary care physician to follow-up for further evaluation.        Abbie Haro MD

## 2022-05-30 ENCOUNTER — APPOINTMENT (OUTPATIENT)
Dept: GENERAL RADIOLOGY | Age: 87
DRG: 965 | End: 2022-05-30
Attending: INTERNAL MEDICINE
Payer: MEDICARE

## 2022-05-30 ENCOUNTER — APPOINTMENT (OUTPATIENT)
Dept: GENERAL RADIOLOGY | Age: 87
End: 2022-05-30
Payer: MEDICARE

## 2022-05-30 ENCOUNTER — APPOINTMENT (OUTPATIENT)
Dept: CT IMAGING | Age: 87
DRG: 965 | End: 2022-05-30
Attending: INTERNAL MEDICINE
Payer: MEDICARE

## 2022-05-30 ENCOUNTER — APPOINTMENT (OUTPATIENT)
Dept: CT IMAGING | Age: 87
End: 2022-05-30
Payer: MEDICARE

## 2022-05-30 ENCOUNTER — HOSPITAL ENCOUNTER (EMERGENCY)
Age: 87
Discharge: ANOTHER HOSPITAL | End: 2022-05-30
Attending: STUDENT IN AN ORGANIZED HEALTH CARE EDUCATION/TRAINING PROGRAM
Payer: MEDICARE

## 2022-05-30 ENCOUNTER — HOSPITAL ENCOUNTER (INPATIENT)
Age: 87
LOS: 7 days | Discharge: NURSING, REHAB OR LONG-TERM CARE | DRG: 965 | End: 2022-06-06
Attending: INTERNAL MEDICINE | Admitting: INTERNAL MEDICINE
Payer: MEDICARE

## 2022-05-30 VITALS
SYSTOLIC BLOOD PRESSURE: 168 MMHG | RESPIRATION RATE: 15 BRPM | TEMPERATURE: 98.5 F | WEIGHT: 134.48 LBS | OXYGEN SATURATION: 100 % | BODY MASS INDEX: 20.45 KG/M2 | HEART RATE: 67 BPM | DIASTOLIC BLOOD PRESSURE: 89 MMHG

## 2022-05-30 DIAGNOSIS — I60.9 SAH (SUBARACHNOID HEMORRHAGE) (HCC): Primary | ICD-10-CM

## 2022-05-30 LAB
A/G RATIO: 1.7 (ref 1.1–2.2)
ALBUMIN SERPL-MCNC: 4.2 G/DL (ref 3.4–5)
ALP BLD-CCNC: 43 U/L (ref 40–129)
ALT SERPL-CCNC: 11 U/L (ref 10–40)
ANION GAP SERPL CALCULATED.3IONS-SCNC: 15 MMOL/L (ref 3–16)
APTT: 26.3 SEC (ref 23–34.3)
AST SERPL-CCNC: 19 U/L (ref 15–37)
BACTERIA: ABNORMAL /HPF
BASOPHILS ABSOLUTE: 0 K/UL (ref 0–0.2)
BASOPHILS RELATIVE PERCENT: 0.5 %
BILIRUB SERPL-MCNC: 0.3 MG/DL (ref 0–1)
BILIRUBIN URINE: NEGATIVE
BLOOD, URINE: ABNORMAL
BUN BLDV-MCNC: 18 MG/DL (ref 7–20)
CALCIUM SERPL-MCNC: 9.1 MG/DL (ref 8.3–10.6)
CHLORIDE BLD-SCNC: 100 MMOL/L (ref 99–110)
CLARITY: CLEAR
CO2: 22 MMOL/L (ref 21–32)
COLOR: YELLOW
CREAT SERPL-MCNC: 1.2 MG/DL (ref 0.6–1.2)
EOSINOPHILS ABSOLUTE: 0.2 K/UL (ref 0–0.6)
EOSINOPHILS RELATIVE PERCENT: 3.6 %
EPITHELIAL CELLS, UA: ABNORMAL /HPF (ref 0–5)
GFR AFRICAN AMERICAN: 51
GFR NON-AFRICAN AMERICAN: 42
GLUCOSE BLD-MCNC: 117 MG/DL (ref 70–99)
GLUCOSE BLD-MCNC: 124 MG/DL (ref 70–99)
GLUCOSE URINE: NEGATIVE MG/DL
HCT VFR BLD CALC: 37.8 % (ref 36–48)
HEMOGLOBIN: 12.7 G/DL (ref 12–16)
INR BLD: 1.03 (ref 0.87–1.14)
KETONES, URINE: NEGATIVE MG/DL
LACTIC ACID: 0.9 MMOL/L (ref 0.4–2)
LEUKOCYTE ESTERASE, URINE: ABNORMAL
LYMPHOCYTES ABSOLUTE: 1 K/UL (ref 1–5.1)
LYMPHOCYTES RELATIVE PERCENT: 21.9 %
MCH RBC QN AUTO: 32.2 PG (ref 26–34)
MCHC RBC AUTO-ENTMCNC: 33.6 G/DL (ref 31–36)
MCV RBC AUTO: 95.8 FL (ref 80–100)
MICROSCOPIC EXAMINATION: YES
MONOCYTES ABSOLUTE: 0.4 K/UL (ref 0–1.3)
MONOCYTES RELATIVE PERCENT: 9 %
NEUTROPHILS ABSOLUTE: 3.1 K/UL (ref 1.7–7.7)
NEUTROPHILS RELATIVE PERCENT: 65 %
NITRITE, URINE: NEGATIVE
PDW BLD-RTO: 12.7 % (ref 12.4–15.4)
PERFORMED ON: ABNORMAL
PH UA: 7.5 (ref 5–8)
PLATELET # BLD: 195 K/UL (ref 135–450)
PMV BLD AUTO: 9.4 FL (ref 5–10.5)
POTASSIUM REFLEX MAGNESIUM: 4.4 MMOL/L (ref 3.5–5.1)
PROTEIN UA: NEGATIVE MG/DL
PROTHROMBIN TIME: 13.4 SEC (ref 11.7–14.5)
RBC # BLD: 3.95 M/UL (ref 4–5.2)
RBC UA: ABNORMAL /HPF (ref 0–4)
SODIUM BLD-SCNC: 137 MMOL/L (ref 136–145)
SPECIFIC GRAVITY UA: 1.01 (ref 1–1.03)
TOTAL PROTEIN: 6.7 G/DL (ref 6.4–8.2)
TROPONIN: <0.01 NG/ML
URINE REFLEX TO CULTURE: ABNORMAL
URINE TYPE: ABNORMAL
UROBILINOGEN, URINE: 0.2 E.U./DL
WBC # BLD: 4.8 K/UL (ref 4–11)
WBC UA: ABNORMAL /HPF (ref 0–5)

## 2022-05-30 PROCEDURE — 70450 CT HEAD/BRAIN W/O DYE: CPT

## 2022-05-30 PROCEDURE — 6360000002 HC RX W HCPCS: Performed by: STUDENT IN AN ORGANIZED HEALTH CARE EDUCATION/TRAINING PROGRAM

## 2022-05-30 PROCEDURE — 99285 EMERGENCY DEPT VISIT HI MDM: CPT

## 2022-05-30 PROCEDURE — 93005 ELECTROCARDIOGRAM TRACING: CPT | Performed by: STUDENT IN AN ORGANIZED HEALTH CARE EDUCATION/TRAINING PROGRAM

## 2022-05-30 PROCEDURE — 85025 COMPLETE CBC W/AUTO DIFF WBC: CPT

## 2022-05-30 PROCEDURE — 71045 X-RAY EXAM CHEST 1 VIEW: CPT

## 2022-05-30 PROCEDURE — 81001 URINALYSIS AUTO W/SCOPE: CPT

## 2022-05-30 PROCEDURE — 85610 PROTHROMBIN TIME: CPT

## 2022-05-30 PROCEDURE — 2580000003 HC RX 258: Performed by: STUDENT IN AN ORGANIZED HEALTH CARE EDUCATION/TRAINING PROGRAM

## 2022-05-30 PROCEDURE — 2000000000 HC ICU R&B

## 2022-05-30 PROCEDURE — 96375 TX/PRO/DX INJ NEW DRUG ADDON: CPT

## 2022-05-30 PROCEDURE — 72125 CT NECK SPINE W/O DYE: CPT

## 2022-05-30 PROCEDURE — 51798 US URINE CAPACITY MEASURE: CPT

## 2022-05-30 PROCEDURE — 73502 X-RAY EXAM HIP UNI 2-3 VIEWS: CPT

## 2022-05-30 PROCEDURE — 96374 THER/PROPH/DIAG INJ IV PUSH: CPT

## 2022-05-30 PROCEDURE — 2500000003 HC RX 250 WO HCPCS: Performed by: STUDENT IN AN ORGANIZED HEALTH CARE EDUCATION/TRAINING PROGRAM

## 2022-05-30 PROCEDURE — 73080 X-RAY EXAM OF ELBOW: CPT

## 2022-05-30 PROCEDURE — 70498 CT ANGIOGRAPHY NECK: CPT

## 2022-05-30 PROCEDURE — 51701 INSERT BLADDER CATHETER: CPT

## 2022-05-30 PROCEDURE — 73030 X-RAY EXAM OF SHOULDER: CPT

## 2022-05-30 PROCEDURE — 80053 COMPREHEN METABOLIC PANEL: CPT

## 2022-05-30 PROCEDURE — 36415 COLL VENOUS BLD VENIPUNCTURE: CPT

## 2022-05-30 PROCEDURE — 83605 ASSAY OF LACTIC ACID: CPT

## 2022-05-30 PROCEDURE — 6360000004 HC RX CONTRAST MEDICATION: Performed by: STUDENT IN AN ORGANIZED HEALTH CARE EDUCATION/TRAINING PROGRAM

## 2022-05-30 PROCEDURE — 2500000003 HC RX 250 WO HCPCS

## 2022-05-30 PROCEDURE — 84484 ASSAY OF TROPONIN QUANT: CPT

## 2022-05-30 PROCEDURE — 85730 THROMBOPLASTIN TIME PARTIAL: CPT

## 2022-05-30 RX ORDER — ONDANSETRON 2 MG/ML
4 INJECTION INTRAMUSCULAR; INTRAVENOUS EVERY 6 HOURS PRN
Status: DISCONTINUED | OUTPATIENT
Start: 2022-05-30 | End: 2022-06-06 | Stop reason: HOSPADM

## 2022-05-30 RX ORDER — LABETALOL HYDROCHLORIDE 5 MG/ML
10 INJECTION, SOLUTION INTRAVENOUS ONCE
Status: COMPLETED | OUTPATIENT
Start: 2022-05-30 | End: 2022-05-30

## 2022-05-30 RX ORDER — LEVETIRACETAM 5 MG/ML
500 INJECTION INTRAVASCULAR ONCE
Status: COMPLETED | OUTPATIENT
Start: 2022-05-30 | End: 2022-05-30

## 2022-05-30 RX ORDER — ONDANSETRON 4 MG/1
4 TABLET, ORALLY DISINTEGRATING ORAL EVERY 8 HOURS PRN
Status: DISCONTINUED | OUTPATIENT
Start: 2022-05-30 | End: 2022-06-06 | Stop reason: HOSPADM

## 2022-05-30 RX ORDER — LIDOCAINE HYDROCHLORIDE 10 MG/ML
INJECTION, SOLUTION EPIDURAL; INFILTRATION; INTRACAUDAL; PERINEURAL
Status: COMPLETED
Start: 2022-05-30 | End: 2022-05-30

## 2022-05-30 RX ORDER — ACETAMINOPHEN 325 MG/1
650 TABLET ORAL EVERY 4 HOURS PRN
Status: DISCONTINUED | OUTPATIENT
Start: 2022-05-30 | End: 2022-06-06 | Stop reason: HOSPADM

## 2022-05-30 RX ADMIN — SODIUM CHLORIDE 2.5 MG/HR: 9 INJECTION, SOLUTION INTRAVENOUS at 15:56

## 2022-05-30 RX ADMIN — ONDANSETRON 4 MG: 2 INJECTION INTRAMUSCULAR; INTRAVENOUS at 17:38

## 2022-05-30 RX ADMIN — LEVETIRACETAM 500 MG: 100 INJECTION, SOLUTION, CONCENTRATE INTRAVENOUS at 20:44

## 2022-05-30 RX ADMIN — LEVETIRACETAM 500 MG: 5 INJECTION INTRAVENOUS at 14:45

## 2022-05-30 RX ADMIN — Medication 5 ML: at 15:26

## 2022-05-30 RX ADMIN — LABETALOL HYDROCHLORIDE 10 MG: 5 INJECTION, SOLUTION INTRAVENOUS at 14:37

## 2022-05-30 RX ADMIN — IOPAMIDOL 75 ML: 755 INJECTION, SOLUTION INTRAVENOUS at 14:30

## 2022-05-30 RX ADMIN — LIDOCAINE HYDROCHLORIDE 5 ML: 10 INJECTION, SOLUTION EPIDURAL; INFILTRATION; INTRACAUDAL; PERINEURAL at 15:26

## 2022-05-30 ASSESSMENT — PAIN DESCRIPTION - PAIN TYPE: TYPE: ACUTE PAIN

## 2022-05-30 ASSESSMENT — ENCOUNTER SYMPTOMS
ABDOMINAL PAIN: 0
VOMITING: 0
NAUSEA: 0
SORE THROAT: 0
EYE DISCHARGE: 0
SHORTNESS OF BREATH: 0
WHEEZING: 0
VOICE CHANGE: 0
COUGH: 0

## 2022-05-30 ASSESSMENT — PAIN SCALES - GENERAL
PAINLEVEL_OUTOF10: 0

## 2022-05-30 ASSESSMENT — PAIN - FUNCTIONAL ASSESSMENT: PAIN_FUNCTIONAL_ASSESSMENT: 0-10

## 2022-05-30 NOTE — PROGRESS NOTES
Clinical Pharmacy Consult Note    80 y.o. female admitted with Subarachnoid bleed. Pharmacy has been asked by Dr. Ene Menendez to adjust all drips to normal saline as appropriate based on compatibility to avoid fluid shifts since D5 is osmotically active. The following intermittent IV drips/infusions have been adjusted to saline:  Keppra    The following medications must remain in D5W due to incompatibility with normal saline:  Amphotericin  Mycophenolate  Nitroprusside  Penicillin G Potassium    Please be aware that patient has D5W ordered as part of hypoglycemia orderset. [facility] will follow daily to ensure all new IVPBs + drips are in NS. Please call with questions.   Kavita Huber, PharmD  Main Pharmacy: [id number]

## 2022-05-30 NOTE — ED NOTES
Nicardipine drip started on EMT pump at 2.5 mg/hr or 25 ml/hr prior to transfer.       Jennifer Adhikari RN  05/30/22 5410

## 2022-05-30 NOTE — PROGRESS NOTES
Pt admitted to room 4503. Neuro exam stable. NIHSS 1 for dysarthria. Pt is alert and oriented, equal strength and full sensation to all extremities. Pt had no c/o headache. Call light within reach and bed wheels locked. Standard safety measures in place. SCD's placed on pt's BLE. Will monitor.

## 2022-05-30 NOTE — H&P
ICU HISTORY AND PHYSICAL       Hospital Day: 0    ICU Day:0                                                          Code:Full Code  Admit Date: 5/30/2022  PCP: Diandra Carey MD                                  CC: Fall to head    HISTORY OF PRESENT ILLNESS:     Janette Yang is an 71-year-old female presents to the hospital secondary to a fall to the head. PMH includes HTN, history CVA? (lacunar stroke), HLD, baseline confusion. Patient was at [facility] earlier today and was found on the floor with a laceration to the forehead. Ultimately brought to [facility] for CT head was completed showing a right frontal subarachnoid hemorrhage extending into the sylvian fissure. Neurosurgery was consulted recommending Keppra and CTA head and neck and transferred to [facility]. On discussion with the patient has had multiple falls in the last year. Uses a cane but did not have cane when fall occurred. Denies recollection of the fall but no light headedness before, no loss of bowel or bladder after and denies any tongue biting. Video camera at location appears to be mechanical fall in nature per niece.  Otherwise well right now with no major complaints    PAST HISTORY:     Past Medical History:   Diagnosis Date    Allergic rhinitis     GERD (gastroesophageal reflux disease)     Hyperlipidemia     Laceration of head 06/23/2016       Past Surgical History:   Procedure Laterality Date    APPENDECTOMY      COLONOSCOPY  4-2011    FOREARM SURGERY Left 8/10/2020    OPEN REDUCTION INTERNAL FIXATION LEFT DISTAL RADIUS with mini c-arm performed by Dionicia Frankel, MD at [address] [address]  8/2010    left Ulna    KNEE SURGERY      left torn meniscus       SocialHistory:   The patient lives at home by self    Alcohol: none  Illicit drugs: no use  Tobacco:  none    Family History:  Family History   Problem Relation Age of Onset    Arthritis Mother     Heart Attack Father        MEDICATIONS:     No current facility-administered medications on file prior to encounter. Current Outpatient Medications on File Prior to Encounter   Medication Sig Dispense Refill    aspirin 325 MG tablet Take 325 mg by mouth           Scheduled Meds:   levETIRAcetam  500 mg IntraVENous Q12H      Continuous Infusions:   niCARdipine 2.5 mg/hr (05/30/22 1627)     PRN Meds:acetaminophen, ondansetron **OR** ondansetron, perflutren lipid microspheres    Allergies: Allergies   Allergen Reactions    Levofloxacin Nausea Only    Atorvastatin Other (See Comments)       REVIEW OF SYSTEMS:       History obtained from the patient    Review of Systems   Constitutional: Negative for chills, diaphoresis and fever. HENT: Negative for sore throat and voice change. Eyes: Negative for discharge. Respiratory: Negative for cough, shortness of breath and wheezing. Cardiovascular: Negative for chest pain and palpitations. Gastrointestinal: Negative for abdominal pain, nausea and vomiting. Genitourinary: Negative for dysuria and urgency. Musculoskeletal: Positive for arthralgias and myalgias. Skin: Positive for wound. Neurological: Negative for dizziness. PHYSICAL EXAM:       Vitals: BP (!) 144/67   Pulse 72   Temp 98.7 °F (37.1 °C) (Oral)   Resp 19   Ht 5' 8\" (1.727 m)   Wt 132 lb 4.4 oz (60 kg)   SpO2 93%   BMI 20.11 kg/m²     I/O:  No intake or output data in the 24 hours ending 05/30/22 1729  No intake/output data recorded. No intake/output data recorded. Physical Examination:     Physical Exam  Constitutional:       General: She is not in acute distress. Appearance: She is not ill-appearing or diaphoretic. HENT:      Head: Normocephalic and atraumatic. Eyes:      Extraocular Movements: Extraocular movements intact. Pupils: Pupils are equal, round, and reactive to light. Cardiovascular:      Rate and Rhythm: Normal rate and regular rhythm. Pulses: Normal pulses.       Heart sounds: No murmur heard.      Pulmonary:      Breath sounds: No wheezing or rales. Abdominal:      General: Abdomen is flat. Bowel sounds are normal.      Palpations: Abdomen is soft. Tenderness: There is no abdominal tenderness. There is no guarding. Musculoskeletal:         General: No swelling or tenderness. Comments: cn2-12 intact with sensation and strength intact upper and lower extrem   Skin:     Coloration: Skin is not jaundiced or pale. Comments: Mild abrasion on the right shoulder and large echymossis on right gluteus    Neurological:      Mental Status: She is alert and oriented to person, place, and time. Psychiatric:         Mood and Affect: Mood normal.         Behavior: Behavior normal.         Thought Content: Thought content normal.       No results for input(s): PHART, DUQ8ISV, PO2ART in the last 72 hours. DATA:       Labs:  CBC:   Recent Labs     05/30/22  1317   WBC 4.8   HGB 12.7   HCT 37.8          BMP:   Recent Labs     05/30/22  1317      K 4.4      CO2 22   BUN 18   CREATININE 1.2   GLUCOSE 124*     LFT's:   Recent Labs     05/30/22  1317   AST 19   ALT 11   BILITOT 0.3   ALKPHOS 43     Troponin:   Recent Labs     05/30/22  1317   TROPONINI <0.01     BNP:No results for input(s): BNP in the last 72 hours. ABGs: No results for input(s): PHART, QKT9BMK, PO2ART in the last 72 hours. INR:   Recent Labs     05/30/22  1317   INR 1.03       U/A:No results for input(s): NITRITE, COLORU, PHUR, LABCAST, WBCUA, RBCUA, MUCUS, TRICHOMONAS, YEAST, BACTERIA, CLARITYU, SPECGRAV, LEUKOCYTESUR, UROBILINOGEN, BILIRUBINUR, BLOODU, GLUCOSEU, AMORPHOUS in the last 72 hours. Invalid input(s): Vikas An    CT head without contrast    (Results Pending)         ASSESSMENT AND PLAN:   Tuan Hong is a 80 y.o. female, who has a PMH of: admitted to the hospital after a fall     Right subarachnoid hemorrhage  -Extends into the right sylvian fissure of the right temporal region.   With right frontal lobe intraparenchymal hemorrhage  -Mild right frontal subdural hematoma  -Not on blood thinners but on aspirin 325     -PT/OT/SLP on board with neurosurgery and neurology  - Q1H neurochecks, HOB >30, SBP <160  -Continue on Keppra 500 twice daily per NSGY  -We will complete 6-hour CT scanner after CTA. If CTA shows signs of aneurysm will need to contact Vascular neurosurgery for possible intervention  -Orthostatic blood pressures when able to stand  -F/U echocardiogram  -F/U TSH, lipid panel, HgA1c, U/A  - Continue cardene drip for SBP goal    Fall  - Recommend Rolator walker on discharge. - Xray  Right elbow and right hip    HTN  -No home meds, will start antihypertensive as necessary  - See above for cardene.  If continues to require cardene late tomorrow will consider starting lisinopril 5mg    Code Status:Limited code x4  FEN: Diet NPO after swallow and 6 hour scan will resume diet  PPX:  SCD  DISPO: ICU likely discharge to  tomorrow    This patient has been staffed and discussed with Dr. Benito Mari,   -----------------------------  Daxa Cline DO, PGY-1  5/30/2022  5:29 PM

## 2022-05-30 NOTE — ED TRIAGE NOTES
Pt arrived to dept via EMS from a laundry mat. Per EMS and son and daughter in law pt was found down but awake with two lacerations on right side of her forehead. Per son pt has baseline confusion and per EMS pt was confused on scene. Pt unsure of how she fell but denies pain or thinners. States that she is unsure if she lost consciousness. Pt awake and alert but initially did not know the date that she was born or the state that she lives in. After a few minutes she was able to correctly say her birthdate, that she lives in [place], the year and that she is in [facility] [employer] [facility].  Skin warm and dry/normal color for ethnicity. Resp easy and unlabored. Pt placed in gown and on cardiac monitor. Call light in reach. Will continue to monitor.

## 2022-05-30 NOTE — ED PROVIDER NOTES
Primary Care Physician: Evan Frey MD   Attending Physician: No att. providers found     History   Chief Complaint   Patient presents with    Fall     Pt c/o fall. Two lacerations on right forehead. Pt unsure how she fell, found on the floor, unkown LOC. Baseline confusion per son. NO thinners.  Head Injury        HPI   Mirian Day  is a 80 y.o. female with history of hypertension, no anticoagulation who presents brought by EMS after a fall. Patient had a fall that was unwitnessed. She had been dropped off by the family member. She was found down with a laceration on the forehead. Patient is demented at baseline and information or history is not reliable. She denies any headaches chest pain shortness of breath fevers chills nausea vomiting abdominal pain leg pain arm pain or back pain.     Past Medical History:   Diagnosis Date    Allergic rhinitis     GERD (gastroesophageal reflux disease)     Hyperlipidemia     Laceration of head 06/23/2016        Past Surgical History:   Procedure Laterality Date    APPENDECTOMY      COLONOSCOPY  4-2011    FOREARM SURGERY Left 8/10/2020    OPEN REDUCTION INTERNAL FIXATION LEFT DISTAL RADIUS with mini c-arm performed by Vijay Reyes MD at [address] [facility]  8/2010    left Ulna    KNEE SURGERY      left torn meniscus        Family History   Problem Relation Age of Onset    Arthritis Mother     Heart Attack Father         Social History     Socioeconomic History    Marital status:      Spouse name: None    Number of children: None    Years of education: None    Highest education level: None   Occupational History    None   Tobacco Use    Smoking status: Never Smoker    Smokeless tobacco: Former User   Vaping Use    Vaping Use: Never used   Substance and Sexual Activity    Alcohol use: No    Drug use: No    Sexual activity: None   Other Topics Concern    None   Social History Narrative    None     Social Determinants of Health Financial Resource Strain:     Difficulty of Paying Living Expenses: Not on file   Food Insecurity:     Worried About Running Out of Food in the Last Year: Not on file    Lui of Food in the Last Year: Not on file   Transportation Needs:     Lack of Transportation (Medical): Not on file    Lack of Transportation (Non-Medical): Not on file   Physical Activity:     Days of Exercise per Week: Not on file    Minutes of Exercise per Session: Not on file   Stress:     Feeling of Stress : Not on file   Social Connections:     Frequency of Communication with Friends and Family: Not on file    Frequency of Social Gatherings with Friends and Family: Not on file    Attends Latter-day Services: Not on file    Active Member of [address] [place] [address] [employer] or Organizations: Not on file    Attends Club or Organization Meetings: Not on file    Marital Status: Not on file   Intimate Partner Violence:     Fear of Current or Ex-Partner: Not on file    Emotionally Abused: Not on file    Physically Abused: Not on file    Sexually Abused: Not on file   Housing Stability:     Unable to Pay for Housing in the Last Year: Not on file    Number of [place] in the Last Year: Not on file    Unstable Housing in the Last Year: Not on file        Review of Systems   10 total systems reviewed and found to be negative unless otherwise noted in HPI     Physical Exam   BP (!) 168/89   Pulse 67   Temp 98.5 °F (36.9 °C) (Oral)   Resp 15   Wt 134 lb 7.7 oz (61 kg)   SpO2 100%   BMI 20.45 kg/m²      CONSTITUTIONAL: Well appearing, in no acute distress   HEAD: normocephalic laceration on the right forehead  EYES: PERRL, No injection, discharge or scleral icterus. ENT: Moist mucous membranes. NECK: Normal ROM, NO LAD   CARDIOVASCULAR: Regular rate and rhythm. No murmurs or gallop. PULMONARY/CHEST: Airway patent. No retractions. Breath sounds clear with good air entry bilaterally.    ABDOMEN: Soft, Non-distended and non-tender, without guarding or rebound. SKIN: Acyanotic, warm, dry   MUSCULOSKELETAL: No swelling, tenderness or deformity   NEUROLOGICAL: Awake and oriented x 3. Pulses intact. Grossly nonfocal   Nursing note and vitals reviewed. ED Course & Medical Decision Making   Medications   levETIRAcetam (KEPPRA) 500 mg/100 mL IVPB (0 mg IntraVENous Stopped 5/30/22 1500)   labetalol (NORMODYNE;TRANDATE) injection 10 mg (10 mg IntraVENous Given 5/30/22 1437)   iopamidol (ISOVUE-370) 76 % injection 75 mL (75 mLs IntraVENous Given 5/30/22 1430)   lidocaine 1 % injection 5 mL (5 mLs IntraDERmal Given by Other 5/30/22 1526)      Labs Reviewed   CBC WITH AUTO DIFFERENTIAL - Abnormal; Notable for the following components:       Result Value    RBC 3.95 (*)     All other components within normal limits   COMPREHENSIVE METABOLIC PANEL W/ REFLEX TO MG FOR LOW K - Abnormal; Notable for the following components:    Glucose 124 (*)     GFR Non- 42 (*)     GFR  51 (*)     All other components within normal limits   TROPONIN   LACTIC ACID   PROTIME-INR   APTT   URINALYSIS WITH REFLEX TO CULTURE      CTA HEAD NECK W CONTRAST   Preliminary Result   No aneurysm or vascular malformation. Multifocal severe intracranial arterial stenoses likely due to   atherosclerosis. Bilateral extracranial vertebral artery stenoses could be due to   atherosclerosis or grade 1 blunt cerebrovascular injuries. XR SHOULDER RIGHT (MIN 2 VIEWS)   Final Result   No acute finding of the right shoulder. XR CHEST PORTABLE   Final Result   No significant findings in the chest.         CT Head WO Contrast   Final Result   1. Mild-to-moderate acute right frontal subarachnoid hemorrhage extending   inferiorly along the right sylvian fissure into the right temporal region. 2. Small acute right frontal lobe intraparenchymal hemorrhage. 3. Mild right frontal subdural hematoma. 4. No mass effect or midline shift.    5. Chronic further evaluation and treatment. ClINICAL IMPRESSION:  1. SAH (subarachnoid hemorrhage) ([address]        DISPOSITION    Transfer to [facility]  ___________________________________________________________________________________  CRITICAL CARE NOTE:  There was a high probability of clinically significant life-threatening deterioration of the patient's condition requiring my urgent intervention. This includes multiple reevaluations, vital sign monitoring, pulse oximetry monitoring, telemetry monitoring, clinical response to the IV medications, reviewing the nursing notes, consultation time, dictation/documentation time, and interpretation of the labwork. (This time excludes time spent performing procedures). I personally saw the patient and independently provided 35 minutes of non-concurrent critical care out of the total shared critical care time provided. _________________________________________________________________________________________  This record is transcribed utilizing voice recognition technology. There are inherent limitations in this technology. In addition, there may be limitations in editing of this report. If there are any discrepancies, please contact me directly.         Carlos Berry MD  05/30/22 0687

## 2022-05-30 NOTE — ED NOTES
Report given to transport team. All questions answered.       [facility], RN  05/30/22 5003

## 2022-05-30 NOTE — ED NOTES
Notified Dr. Chelly Bill of CT results. Orders for neurosurgery consult placed.       Melinda Driscoll RN  05/30/22 8860

## 2022-05-30 NOTE — ED NOTES
[facility] to assume care when pt arrives to 4503. Pt remains alert and intermittently confused which is her baseline according to her family. Pain score and pt condition reviewed. All questions answered.       Nazia Lara RN  05/30/22 0309

## 2022-05-31 ENCOUNTER — APPOINTMENT (OUTPATIENT)
Dept: CT IMAGING | Age: 87
DRG: 965 | End: 2022-05-31
Attending: INTERNAL MEDICINE
Payer: MEDICARE

## 2022-05-31 ENCOUNTER — APPOINTMENT (OUTPATIENT)
Dept: MRI IMAGING | Age: 87
DRG: 965 | End: 2022-05-31
Attending: INTERNAL MEDICINE
Payer: MEDICARE

## 2022-05-31 ENCOUNTER — APPOINTMENT (OUTPATIENT)
Dept: GENERAL RADIOLOGY | Age: 87
DRG: 965 | End: 2022-05-31
Attending: INTERNAL MEDICINE
Payer: MEDICARE

## 2022-05-31 LAB
ALBUMIN SERPL-MCNC: 4.3 G/DL (ref 3.4–5)
ANION GAP SERPL CALCULATED.3IONS-SCNC: 12 MMOL/L (ref 3–16)
BUN BLDV-MCNC: 16 MG/DL (ref 7–20)
CALCIUM SERPL-MCNC: 9.4 MG/DL (ref 8.3–10.6)
CHLORIDE BLD-SCNC: 103 MMOL/L (ref 99–110)
CHOLESTEROL, TOTAL: 183 MG/DL (ref 0–199)
CO2: 26 MMOL/L (ref 21–32)
CREAT SERPL-MCNC: 1.2 MG/DL (ref 0.6–1.2)
EKG ATRIAL RATE: 76 BPM
EKG DIAGNOSIS: NORMAL
EKG Q-T INTERVAL: 382 MS
EKG QRS DURATION: 54 MS
EKG QTC CALCULATION (BAZETT): 426 MS
EKG R AXIS: 57 DEGREES
EKG T AXIS: 59 DEGREES
EKG VENTRICULAR RATE: 75 BPM
GFR AFRICAN AMERICAN: 51
GFR NON-AFRICAN AMERICAN: 42
GLUCOSE BLD-MCNC: 97 MG/DL (ref 70–99)
HCT VFR BLD CALC: 36.5 % (ref 36–48)
HDLC SERPL-MCNC: 52 MG/DL (ref 40–60)
HEMOGLOBIN: 12 G/DL (ref 12–16)
LDL CHOLESTEROL CALCULATED: 119 MG/DL
LV EF: 58 %
LVEF MODALITY: NORMAL
MAGNESIUM: 2.3 MG/DL (ref 1.8–2.4)
MCH RBC QN AUTO: 31.8 PG (ref 26–34)
MCHC RBC AUTO-ENTMCNC: 33 G/DL (ref 31–36)
MCV RBC AUTO: 96.5 FL (ref 80–100)
PDW BLD-RTO: 13 % (ref 12.4–15.4)
PHOSPHORUS: 3.9 MG/DL (ref 2.5–4.9)
PLATELET # BLD: 199 K/UL (ref 135–450)
PMV BLD AUTO: 9.2 FL (ref 5–10.5)
POTASSIUM SERPL-SCNC: 4.4 MMOL/L (ref 3.5–5.1)
RBC # BLD: 3.78 M/UL (ref 4–5.2)
SODIUM BLD-SCNC: 141 MMOL/L (ref 136–145)
TRIGL SERPL-MCNC: 58 MG/DL (ref 0–150)
TSH REFLEX: 3.57 UIU/ML (ref 0.27–4.2)
VLDLC SERPL CALC-MCNC: 12 MG/DL
WBC # BLD: 6.3 K/UL (ref 4–11)

## 2022-05-31 PROCEDURE — 51701 INSERT BLADDER CATHETER: CPT

## 2022-05-31 PROCEDURE — 73700 CT LOWER EXTREMITY W/O DYE: CPT

## 2022-05-31 PROCEDURE — 70450 CT HEAD/BRAIN W/O DYE: CPT

## 2022-05-31 PROCEDURE — 73721 MRI JNT OF LWR EXTRE W/O DYE: CPT

## 2022-05-31 PROCEDURE — 6360000002 HC RX W HCPCS: Performed by: NURSE PRACTITIONER

## 2022-05-31 PROCEDURE — 99223 1ST HOSP IP/OBS HIGH 75: CPT | Performed by: INTERNAL MEDICINE

## 2022-05-31 PROCEDURE — C8929 TTE W OR WO FOL WCON,DOPPLER: HCPCS

## 2022-05-31 PROCEDURE — 6360000002 HC RX W HCPCS: Performed by: STUDENT IN AN ORGANIZED HEALTH CARE EDUCATION/TRAINING PROGRAM

## 2022-05-31 PROCEDURE — 83735 ASSAY OF MAGNESIUM: CPT

## 2022-05-31 PROCEDURE — 73030 X-RAY EXAM OF SHOULDER: CPT

## 2022-05-31 PROCEDURE — 84443 ASSAY THYROID STIM HORMONE: CPT

## 2022-05-31 PROCEDURE — 94761 N-INVAS EAR/PLS OXIMETRY MLT: CPT

## 2022-05-31 PROCEDURE — 36415 COLL VENOUS BLD VENIPUNCTURE: CPT

## 2022-05-31 PROCEDURE — 51798 US URINE CAPACITY MEASURE: CPT

## 2022-05-31 PROCEDURE — 80069 RENAL FUNCTION PANEL: CPT

## 2022-05-31 PROCEDURE — 92610 EVALUATE SWALLOWING FUNCTION: CPT

## 2022-05-31 PROCEDURE — 92523 SPEECH SOUND LANG COMPREHEN: CPT

## 2022-05-31 PROCEDURE — 2580000003 HC RX 258: Performed by: STUDENT IN AN ORGANIZED HEALTH CARE EDUCATION/TRAINING PROGRAM

## 2022-05-31 PROCEDURE — 85027 COMPLETE CBC AUTOMATED: CPT

## 2022-05-31 PROCEDURE — 1200000000 HC SEMI PRIVATE

## 2022-05-31 PROCEDURE — 2580000003 HC RX 258: Performed by: NURSE PRACTITIONER

## 2022-05-31 PROCEDURE — 80061 LIPID PANEL: CPT

## 2022-05-31 PROCEDURE — 93010 ELECTROCARDIOGRAM REPORT: CPT | Performed by: INTERNAL MEDICINE

## 2022-05-31 PROCEDURE — 83036 HEMOGLOBIN GLYCOSYLATED A1C: CPT

## 2022-05-31 RX ORDER — HYDRALAZINE HYDROCHLORIDE 20 MG/ML
10 INJECTION INTRAMUSCULAR; INTRAVENOUS EVERY 6 HOURS PRN
Status: DISCONTINUED | OUTPATIENT
Start: 2022-05-31 | End: 2022-06-06 | Stop reason: HOSPADM

## 2022-05-31 RX ADMIN — LEVETIRACETAM 500 MG: 100 INJECTION, SOLUTION, CONCENTRATE INTRAVENOUS at 08:45

## 2022-05-31 RX ADMIN — LEVETIRACETAM 500 MG: 100 INJECTION, SOLUTION, CONCENTRATE INTRAVENOUS at 20:23

## 2022-05-31 ASSESSMENT — PAIN SCALES - GENERAL
PAINLEVEL_OUTOF10: 0

## 2022-05-31 NOTE — PROGRESS NOTES
Speech Language Pathology  Facility/Department: [facility]  Initial Speech/Language/Cognitive Assessment    NAME: Ileana Merida  : 1934   MRN: [id number]  ADMISSION DATE: 2022  ADMITTING DIAGNOSIS: has Hyperlipidemia; GERD (gastroesophageal reflux disease); Closed fracture of left distal radius; Other secondary hypertension; and Subarachnoid bleed ([address] on their problem list.  DATE ONSET: 2022    Date of Eval: 2022   Evaluating Therapist: BRENT Aguilar    RECENT RESULTS  CT OF HEAD:2022  Impression       Minimal progression in extra-axial hemorrhage in the right vertex as well as tiny amount of layering intraventricular hemorrhage.       Otherwise, intra-axial and extra-axial hemorrhage are without change.           Primary Complaint: difficulty communicating    Pain:  Pain Assessment  Pain Assessment: None - Denies Pain  Pain Level: 0  Patient's Stated Pain Goal: 0 - No pain  Non-Pharmaceutical Pain Intervention(s): Repositioned,Rest,Emotional support  Pain Assessment in Advanced Dementia (PAINAD)  Non-Pharmaceutical Pain Intervention(s): Repositioned,Rest,Emotional support  Faces, Legs, Activity, Cry, and Consolability (FLACC)  Non-Pharmaceutical Pain Intervention(s): Repositioned,Rest,Emotional support    Assessment:  Diagnosis: Communication and cognitive impairment, including the areas of expressive/receptive aphasia (word finding, confrontation naming, divergent naming, yes/no question (60% accuracy), automatics, following directions), some seeming motor speech/dysarthric component, and impaired orientation, recall, attention. Of note, suspect general lethargy/AMS and possible hearing impairmenet may be impacting performance. Recommend ongoing assessment/therapy. Will continue to follow.     Recommendations:  Requires SLP Intervention: Yes  Duration of Treatment: 1-2 wks or LOS  D/C Recommendations: Ongoing speech therapy is recommended during this hospitalization Moderate  One Step Commands: Mild  Two Step Commands: Moderate  Conversation: Moderate    Reading Comprehension  Reading Status: Unable to assess    Expression  Primary Mode of Expression: Verbal    Verbal Expression  Verbal Expression: Exceptions to functional limits  Initiation: Moderate  Automatic Speech: Moderate  Confrontation: Mild  Divergent: Moderate  Responsive: Moderate  Conversation: Mild  Interfering Components: Attention;Perseverations; Impaired thought organization;Paraphasia    Cognition:      Orientation  Overall Orientation Status: Impaired  Orientation Level: Oriented to person;Disoriented to place; Disoriented to time;Disoriented to situation  Attention  Attention: Exceptions to [facility]  Memory  Memory: Exceptions to [facility]    Prognosis:  Speech Therapy Prognosis  Prognosis: Fair  Prognosis Considerations: Age;Medical Prognosis; Medical Diagnosis  Individuals consulted  Consulted and agree with results and recommendations: Patient    Education:  Patient Education: Educated pt to purpose of visit & recommendations. Patient Education Response: Verbalizes understanding  Safety Devices in place: Yes  Type of devices: All fall risk precautions in place; Left in bed    Therapy Time:   Individual Concurrent Group Co-treatment   Time In 1034         Time Out 1044         Minutes 10            Timed Code Treatment Minutes: 0 Minutes  Total Treatment Time: [address] [place], [place], [id number] [place], [place] [id number]  Pg.  # [id number]

## 2022-05-31 NOTE — CARE COORDINATION
with Home Health Care: No    Durable Medical Equipment  Equipment: cane    DISCHARGE PLAN:  Disposition: Anticipate home with home health care v Acute Rehab. The family would prefer home. Transportation PLAN for discharge: family     The Patient and/or patient representative Pauly Fleming and her family were provided with a choice of provider and agrees with the discharge plan Yes    Freedom of choice list was provided with basic dialogue that supports the patient's individualized plan of care/goals and shares the quality data associated with the providers.  Yes    Phoebe Herr RN  [facility] [employer]  Case Management Department  [phone]

## 2022-05-31 NOTE — PROGRESS NOTES
Clinical Pharmacy Progress Note  Medication History    Admit Date: 5/30/2022     List of current medications patient is taking is complete. Home medication list in EPIC updated to reflect changes noted below. Source of information: patient and family    Per patient and family in the room and on the phone pt only takes Aspirin (dose unknown) and calcium gummies at home. Please call with any questions.     Janelle Rivera, PharmD  PGY-1 Pharmacy Resident  [id number]  5/31/2022 2:32 PM

## 2022-05-31 NOTE — PROGRESS NOTES
Physician Progress Note      PATIENTTruditucker Oneil  CSN #:                  [id number]  :                       1934  ADMIT DATE:       2022 4:33 PM  [address] [place] DATE:  RESPONDING  PROVIDER #:        Lalit uSnshine MD          QUERY TEXT:    Patient admitted with [facility] following a fall. Per  Neurosurgery consult:   Imaging shows soft tissue injury to the scalp overlying a cortical SAH. CTA   shows no vascular lesion. These findings are consistent with a traumatic SAH. If possible, please document in progress notes and discharge summary if you   are treating/evaluating any of the following: The medical record reflects the following:  Risk Factors: 80 y.o. female presented with a Right SAH, Right frontal lobe   intraparenchymal hemorrhage, and mild right frontal subdural hematoma   following a fall, PMH falls  Clinical Indicators: Pt was at [employer] earlier and was found on the floor   with a claeration to the forehead. Treatment: Neurosurgery consult, Neuro checks, Keppra  Options provided:  -- Traumatic SAH without LOC  -- Traumatic SAH with LOC of unknown duration  -- Nontraumatic SAH  -- Other - I will add my own diagnosis  -- Disagree - Not applicable / Not valid  -- Disagree - Clinically unable to determine / Unknown  -- Refer to Clinical Documentation Reviewer    PROVIDER RESPONSE TEXT:    This patient has a traumatic SAH without LOC.     Query created by: Gemini Vital on 2022 2:59 PM      Electronically signed by:  Lalit Sunshine MD 2022 3:13 PM

## 2022-05-31 NOTE — PROGRESS NOTES
Hospitalist Progress Note      PCP: Amalia Holter, MD    Date of Admission: 5/30/2022    Chief Complaint: Fall to head    Hospital Course: H&P    Subjective: Seen and examined at bedside. Patient only oriented to herself. Currently off nicardipine drip. Blood pressure at goal. SBP<160. Repeat CT head wo contrast today at 10:37 AM showed minimal progression in etra-axial hemorrhage in the right vertex as well as tiny amount of layering intraventricular hemorrhage. Neurosurgeon consulted. To transfer to fifth floor. Repeat CT head tomorrow 6/1 at 6am ordered. SLP consulted but not able to complete due to patient consistency in redirection. CT right hip (5/31/22) showed nondisplaced fx of the cortex of the greater trachanter. Orthopedics consulted. MRI right hip ordered. Medications:  Reviewed    Infusion Medications    niCARdipine 2.5 mg/hr (05/30/22 1627)     Scheduled Medications    levETIRAcetam  500 mg IntraVENous Q12H     PRN Meds: hydrALAZINE, acetaminophen, ondansetron **OR** ondansetron, perflutren lipid microspheres      Intake/Output Summary (Last 24 hours) at 5/31/2022 1623  Last data filed at 5/31/2022 0400  Gross per 24 hour   Intake --   Output 700 ml   Net -700 ml       Physical Exam Performed:    /67   Pulse 57   Temp 97.7 °F (36.5 °C) (Oral)   Resp 16   Ht 5' 8\" (1.727 m)   Wt 132 lb 4.4 oz (60 kg)   SpO2 96%   BMI 20.11 kg/m²     General appearance: No apparent distress, appears stated age and cooperative. HEENT: Pupils equal, round, and reactive to light. Conjunctivae/corneas clear. Neck: Supple, with full range of motion. No jugular venous distention. Trachea midline. Respiratory:  Normal respiratory effort. Clear to auscultation, bilaterally without Rales/Wheezes/Rhonchi. Cardiovascular: Regular rate and rhythm with normal S1/S2 without murmurs, rubs or gallops. Abdomen: Soft, non-tender, non-distended with normal bowel sounds.   Musculoskeletal: No clubbing, cyanosis or edema bilaterally. Full range of motion without deformity. Skin: Skin color, texture, turgor normal.  No rashes or lesions. Neurologic:  Neurovascularly intact without any focal sensory/motor deficits. Cranial nerves: II-XII intact, grossly non-focal.  Psychiatric: Alert only oriented to herself. Capillary Refill: Brisk,3 seconds, normal   Peripheral Pulses: +2 palpable, equal bilaterally       Labs:   Recent Labs     05/30/22  1317 05/31/22  0616   WBC 4.8 6.3   HGB 12.7 12.0   HCT 37.8 36.5    199     Recent Labs     05/30/22  1317 05/31/22  0616    141   K 4.4 4.4    103   CO2 22 26   BUN 18 16   CREATININE 1.2 1.2   CALCIUM 9.1 9.4   PHOS  --  3.9     Recent Labs     05/30/22  1317   AST 19   ALT 11   BILITOT 0.3   ALKPHOS 43     Recent Labs     05/30/22  1317   INR 1.03     Recent Labs     05/30/22  1317   TROPONINI <0.01       Urinalysis:      Lab Results   Component Value Date    NITRU Negative 05/30/2022    WBCUA 0-2 05/30/2022    BACTERIA Rare 05/30/2022    RBCUA 0-2 05/30/2022    BLOODU TRACE-INTACT 05/30/2022    SPECGRAV 1.010 05/30/2022    GLUCOSEU Negative 05/30/2022       Radiology:  CT HIP RIGHT WO CONTRAST   Final Result   1. Nondisplaced fracture the cortex of the greater trochanter and irregularity of the femoral neck with underlying osteoporosis. Occult fracture and contusion are considered. Further evaluation with magnetic resonance imaging is recommended to assess the    degree of marrow edema. 2. Underlying osteoarthritis of the femoral acetabular joint. CT HEAD WO CONTRAST   Final Result      Minimal progression in extra-axial hemorrhage in the right vertex as well as tiny amount of layering intraventricular hemorrhage. Otherwise, intra-axial and extra-axial hemorrhage are without change. XR SHOULDER RIGHT (MIN 2 VIEWS)   Final Result     No acute findings in the right shoulder.           CT HEAD WO CONTRAST   Final Result 1.  Questionable minimal improvement in the subarachnoid    hemorrhage, as detailed above. 2.  The intraparenchymal focus involving the right high    frontoparietal region is stable in appearance and morphology. No    new interval intracranial hemorrhage identified. 3.  No significant new mass effect or midline shift. XR HIP 2-3 VW W PELVIS RIGHT   Final Result      Lucency in the right femoral head neck junction could be artifactual but a subcapital fracture is difficult to exclude. CT recommended. XR ELBOW RIGHT (MIN 3 VIEWS)   Final Result      No evidence of fracture. CT head without contrast   Final Result      1. Increase in area of the right frontal and temporal subarachnoid hemorrhage. 2.  Slightly increased size of the right frontal convexity acute subdural hematoma. 3.  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 4.  Stable high right frontal lobe hemorrhagic cortical contusions. 5.  No new mass effect or shift. CT HEAD WO CONTRAST    (Results Pending)   MRI HIP RIGHT WO CONTRAST    (Results Pending)           Assessment/Plan:    Active Hospital Problems    Diagnosis     Closed fracture of neck of right femur ([facility] [address] [S72.001A]      Priority: Medium    Subarachnoid bleed (H[facility]) [I60.9]      Priority: Medium     #Subarachnoid hemorrhage secondary to fall  #Closed fracture of the neck of right femoral  #Fall  #Baseline confusion  #Hypertension    Plans:    Repeat CT head without contrast tomorrow a.m., neurosurgeon following  Continue Keppra 500 twice daily for 7 days for now  Obtain MRI of right hip, orthopedics following  Off nicardipine drip now, monitor blood pressure closely for now with goal of systolic blood pressure less than 160, hydralazine as needed  Need SLP eval  PT/OT    DVT Prophylaxis: SCD for now, pending repeat CT head  Diet: ADULT DIET;  Regular; 4 carb choices (60 gm/meal)  Code Status: Limited    PT/OT Eval Status: ordered    Dispo -inpatient pending clinical improvement    José Gray MD

## 2022-05-31 NOTE — PROGRESS NOTES
Physical Therapy/Occupational therapy  HOLD    Orders received, chart reviewed. Pt with multiple medical issues. Recent R hip CT states nondisplaced fx of greater trochanter. Will hold PT/OT orders until hip fx is addressed per medical team.  Note pt is also on bedrest - will need upgraded activity orders including WB orders when appropriate. Discussed with RN. Joe f/u 6/1.     Grant Franco, PT, DPT  [id number]  Isrrael Nam  MS, OTR/L #[id number]

## 2022-05-31 NOTE — PLAN OF CARE
Problem: SLP Adult - Impaired Swallowing  Goal: By Discharge: Advance to least restrictive diet without signs or symptoms of aspiration for planned discharge setting. See evaluation for individualized goals. Outcome: Progressing     Problem: SLP Adult - Impaired Communication  Goal: By Discharge: Demonstrates communication skills at highest level of function for planned discharge setting. See evaluation for individualized goals. Outcome: Progressing     Problem: SLP Adult - Disturbed Thought Process  Goal: By Discharge: Demonstrates cognitive skills at highest level of function for planned discharge setting. See evaluation for individualized goals.   Outcome: Progressing

## 2022-05-31 NOTE — PLAN OF CARE
Problem: Discharge Planning  Goal: Discharge to home or other facility with appropriate resources  5/31/2022 0908 by Tiffany Deutsch RN  Outcome: Progressing  Flowsheets (Taken 5/31/2022 0800)  Discharge to home or other facility with appropriate resources: Identify barriers to discharge with patient and caregiver     Problem: Pain  Goal: Verbalizes/displays adequate comfort level or baseline comfort level  5/31/2022 0908 by Tiffany Deutsch RN  Outcome: Progressing  Flowsheets  Taken 5/31/2022 0908  Verbalizes/displays adequate comfort level or baseline comfort level:   Encourage patient to monitor pain and request assistance   Assess pain using appropriate pain scale  Taken 5/31/2022 0800  Verbalizes/displays adequate comfort level or baseline comfort level: Assess pain using appropriate pain scale     Problem: Skin/Tissue Integrity  Goal: Absence of new skin breakdown  Description: 1. Monitor for areas of redness and/or skin breakdown  2. Assess vascular access sites hourly  3. Every 4-6 hours minimum:  Change oxygen saturation probe site  4. Every 4-6 hours:  If on nasal continuous positive airway pressure, respiratory therapy assess nares and determine need for appliance change or resting period.   5/31/2022 0908 by Tiffany Deutsch RN  Outcome: Progressing     Problem: Safety - Adult  Goal: Free from fall injury  5/31/2022 0908 by Tiffany Deutsch RN  Outcome: Progressing

## 2022-05-31 NOTE — PLAN OF CARE
Problem: Discharge Planning  Goal: Discharge to home or other facility with appropriate resources  Outcome: Progressing  Flowsheets  Taken 5/30/2022 2000 by Lucie Brenner RN  Discharge to home or other facility with appropriate resources:   Identify barriers to discharge with patient and caregiver   Arrange for needed discharge resources and transportation as appropriate   Identify discharge learning needs (meds, wound care, etc)   Refer to discharge planning if patient needs post-hospital services based on physician order or complex needs related to functional status, cognitive ability or social support system  Taken 5/30/2022 1700 by Martha Carver RN  Discharge to home or other facility with appropriate resources:   Identify barriers to discharge with patient and caregiver   Arrange for needed discharge resources and transportation as appropriate   Identify discharge learning needs (meds, wound care, etc)   Refer to discharge planning if patient needs post-hospital services based on physician order or complex needs related to functional status, cognitive ability or social support system     Problem: Pain  Goal: Verbalizes/displays adequate comfort level or baseline comfort level  Outcome: Progressing  Flowsheets (Taken 5/30/2022 2000)  Verbalizes/displays adequate comfort level or baseline comfort level:   Encourage patient to monitor pain and request assistance   Assess pain using appropriate pain scale   Administer analgesics based on type and severity of pain and evaluate response   Implement non-pharmacological measures as appropriate and evaluate response   Consider cultural and social influences on pain and pain management   Notify Licensed Independent Practitioner if interventions unsuccessful or patient reports new pain     Problem: Skin/Tissue Integrity  Goal: Absence of new skin breakdown  Description: 1. Monitor for areas of redness and/or skin breakdown  2.   Assess vascular access sites hourly  3. Every 4-6 hours minimum:  Change oxygen saturation probe site  4. Every 4-6 hours:  If on nasal continuous positive airway pressure, respiratory therapy assess nares and determine need for appliance change or resting period.   Outcome: Progressing     Problem: Safety - Adult  Goal: Free from fall injury  Outcome: Progressing     Problem: ABCDS Injury Assessment  Goal: Absence of physical injury  Outcome: Progressing

## 2022-05-31 NOTE — CONSULTS
NEUROSURGERY CONSULT NOTE    Hui Ramos  [id number]   1934   5/31/2022    Requesting physician: Marshal Gramajo MD    Reason for consultation: Traumatic ICH    History of present illness: Patient is a 80 y.o. female that  has a past medical history of Allergic rhinitis, GERD (gastroesophageal reflux disease), Hyperlipidemia, and Laceration of head (06/23/2016). Patient presented on 5/30/2022 to [facility] ED s/p fall. Patient was at [facility] yesterday and was found on the floor. CT head was completed showing a right frontal subarachnoid hemorrhage extending into the sylvian fissure. Neurosurgery was consulted and transferred to [facility].    ROS:   GENERAL:  Denies fever or recent illness.  Denies weight changes   EYES:  Denies vision change or diplopia  EARS:  Denies hearing loss  CARDIAC:  Denies chest pain  RESPIRATORY:  Denies shortness of breath  SKIN:  Denies rash or lesions   HEM:  Denies excessive bruising  PSYCH:  Denies anxiety or depression  NEURO:  Endorses headache; Denies numbness or tingling or lateralizing weakness   :  Denies urinary difficulty  GI: Denies nausea, vomiting, diarrhea or constipation  MUSCULOSKELETAL: Endorses right hip pain    Allergies   Allergen Reactions    Levofloxacin Nausea Only    Atorvastatin Other (See Comments)       Past Medical History:   Diagnosis Date    Allergic rhinitis     GERD (gastroesophageal reflux disease)     Hyperlipidemia     Laceration of head 06/23/2016        Past Surgical History:   Procedure Laterality Date    APPENDECTOMY      COLONOSCOPY  4-2011    FOREARM SURGERY Left 8/10/2020    OPEN REDUCTION INTERNAL FIXATION LEFT DISTAL RADIUS with mini c-arm performed by Kane Parsons MD at [address] [address]  8/2010    left Ulna    KNEE SURGERY      left torn meniscus       Social History     Occupational History    Not on file   Tobacco Use    Smoking status: Never Smoker    Smokeless tobacco: Former User   Vaping Use   [address] Use: Never used   Substance and Sexual Activity    Alcohol use: No    Drug use: No    Sexual activity: Not on file        Family History   Problem Relation Age of Onset    Arthritis Mother     Heart Attack Father         No outpatient medications have been marked as taking for the 5/30/22 encounter [facility] Encounter). Current Facility-Administered Medications   Medication Dose Route Frequency Provider Last Rate Last Admin    niCARdipine (CARDENE) 25 mg in sodium chloride 0.9 % 250 mL infusion  2.5-15 mg/hr IntraVENous Continuous Marley Hem, DO 25 mL/hr at 05/30/22 1627 2.5 mg/hr at 05/30/22 1627    acetaminophen (TYLENOL) tablet 650 mg  650 mg Oral Q4H PRN Marley Hem, DO        ondansetron (ZOFRAN-ODT) disintegrating tablet 4 mg  4 mg Oral Q8H PRN Marley Hem, DO        Or    [facility]ron [facility]) injection 4 mg  4 mg IntraVENous Q6H PRN Marley Hem, DO   4 mg at 05/30/22 1738    levETIRAcetam (KEPPRA) 500 mg in sodium chloride 0.9 % 100 mL IVPB  500 mg IntraVENous Q12H Marley Hem, DO   Stopped at 05/31/22 0900    perflutren lipid microspheres (DEFINITY) injection 1.65 mg  1.5 mL IntraVENous ONCE PRN Marley Hem, DO            Objective:  /74   Pulse 54   Temp 97.7 °F (36.5 °C) (Oral)   Resp 19   Ht 5' 8\" (1.727 m)   Wt 132 lb 4.4 oz (60 kg)   SpO2 96%   BMI 20.11 kg/m²     Physical Exam:   Patient seen and examined  GCS:  4 - Opens eyes on own  5 - Alert and oriented  6 - Follows simple motor commands  General: Well developed. Alert and cooperative in no acute distress. HENT: atraumatic, neck supple  Eyes: Optic discs: Not tested  Pulmonary: unlabored respiratory effort  Cardiovascular:  Warm well perfused.  No peripheral edema  Gastrointestinal: abdomen soft, NT, ND    Neurological:  Mental Status: Awake, alert, oriented x 4, speech clear and appropriate  Attention: Intact  Language: No aphasia or dysarthria noted  Sensation: Intact to all extremities to light touch  Coordination: Intact    Cranial Nerves:  II: Visual acuity not tested, denies new visual changes / diplopia  III, IV, VI: PERRL, 3 mm bilaterally, EOMI, no nystagmus noted  V: Facial sensation intact bilaterally to touch  VII: Face symmetric  VIII: Hearing intact bilaterally to spoken voice  IX: Palate movement equal bilaterally  XI: Shoulder shrug equal bilaterally  XII: Tongue midline    Musculoskeletal:   Gait: Not tested   Assist devices: None   Tone: Normal  Motor strength: RLE exam deferred 2/2 injury   Right  Left    Right  Left    Deltoid  5 5  Hip Flex  Defer 5   Biceps  5 5  Knee Extensors  Defer 5   Triceps  5 5  Knee Flexors  Defer 5   Wrist Ext  5 5  Ankle Dorsiflex. Defer 5   Wrist Flex  5 5  Ankle Plantarflex. Defer 5   Handgrip  5 5  Ext Naanth Longus  Defer 5   Thumb Ext  5 5         Radiological Findings:  CT Head WO Contrast & CT Cervical Spine WO Contrast  Result Date: 5/30/2022  1. Mild-to-moderate acute right frontal subarachnoid hemorrhage extending inferiorly along the right sylvian fissure into the right temporal region. 2. Small acute right frontal lobe intraparenchymal hemorrhage. 3. Mild right frontal subdural hematoma. 4. No mass effect or midline shift. 5. Chronic left frontal subdural hygroma. 6. No acute fracture or subluxation of the cervical spine. CT head without contrast  Result Date: 5/30/2022  1. Increase in area of the right frontal and temporal subarachnoid hemorrhage. 2.  Slightly increased size of the right frontal convexity acute subdural hematoma. 3.  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 4.  Stable high right frontal lobe hemorrhagic cortical contusions. 5.  No new mass effect or shift. CTA HEAD NECK W CONTRAST  Result Date: 5/30/2022  No aneurysm or vascular malformation. Multifocal severe intracranial arterial stenoses likely due to atherosclerosis. Bilateral extracranial vertebral artery stenoses could be due to atherosclerosis or grade 1 blunt cerebrovascular injuries. CT HEAD WO CONTRAST  Result Date: 5/31/2022  1. Questionable minimal improvement in the subarachnoid hemorrhage, as detailed above. 2.  The intraparenchymal focus involving the right high frontoparietal region is stable in appearance and morphology. No new interval intracranial hemorrhage identified. 3.  No significant new mass effect or midline shift. CT HEAD WO CONTRAST  Result Date: 5/31/2022  Minimal progression in extra-axial hemorrhage in the right vertex as well as tiny amount of layering intraventricular hemorrhage. Otherwise, intra-axial and extra-axial hemorrhage are without change. Labs:  Recent Labs     05/31/22  0616   WBC 6.3   HGB 12.0   HCT 36.5          Recent Labs     05/31/22  0616      K 4.4      CO2 26   BUN 16   CREATININE 1.2   GLUCOSE 97   CALCIUM 9.4   PHOS 3.9   MG 2.30       Recent Labs     05/30/22  1317   PROTIME 13.4   INR 1.03   APTT 26.3       Patient Active Problem List    Diagnosis Date Noted    Subarachnoid bleed ([address] 05/30/2022    Other secondary hypertension 12/18/2020    Closed fracture of left distal radius     GERD (gastroesophageal reflux disease)     Hyperlipidemia        Assessment:  Patient is a 80 y.o. female w/traumatic SAH and ICH that had minimal progression on most recent CT Head. Patient also has a nondisplaced fracture the cortex of the greater trochanter and irregularity of the femoral neck on RLE. Plan:  1. No emergent neurosurgical intervention indicated  2. Neurologic exams frequency: Q4H  3. For change in exam MUST contact neurosurgery team along with critical care or primary team  4. Traumatic ICH & SAH:  - CT Head w/o contrast in the morning  - Maintain SBP <160;  If PRN med insufficient, then may start Nicardipine infusion  - Hold all full dose anticoagulation & antiplatelet for 2 weeks  - Keep Plt >100k & INR <1.4  - Follow up in 2 weeks with repeat CT Head  5. Cerebral edema:  - Keep HOB >30 degrees  - Keep Na >135  - NO dextrose in IVF's or in IV drips  - NO Decadron  - If central venous access is needed please use subclavian vs femoral - No IJ as this can decrease venous return and worsen cerebral edema  6. Speech consulted for swallow eval, appreciate recs  7. PT/OT consulted, appreciate recs  8. Advance diet / activity per primary team  9. DVT Prophylaxis: SCD's & OK to start chemoprophylaxis 24 hours after stable CT scan, if medically indicated  10. Appreciate critical care team assistance in management  11. Will follow inpatient. Please call with any questions or decline in neurological status    DISPO: OK to transfer to  from neurosurgery standpoint. Dispo timing to be determined by primary team once patient is medically stable for discharge. Patient was seen and examined with Dr. Arminda Awan who agrees with above assessment and plan.      Electronically signed by: Jerelene Scheuermann, APRN - CNP, APRN-CNP, 5/31/2022 1:59 PM  [phone]

## 2022-05-31 NOTE — CONSULTS
Initial Pulmonary & Critical Care Consult Note      Reason for Consult: Fall   Requesting Physician: Marko Ac    Subjective:   CHIEF COMPLAINT / HPI:                Shankar Lewis is an 70-year-old female presents to the hospital secondary to a fall to the head.  PMH includes HTN, history CVA? (lacunar stroke), HLD, baseline confusion.  Patient was at [facility] earlier today and was found on the floor with a laceration to the forehead.  Ultimately brought to [facility] for CT head was completed showing a right frontal subarachnoid hemorrhage extending into the sylvian fissure.  Neurosurgery was consulted recommending Keppra and CTA head and neck and transferred to [facility].     On discussion with the patient has had multiple falls in the last year. Uses a cane but did not have cane when fall occurred. Denies recollection of the fall but no light headedness before, no loss of bowel or bladder after and denies any tongue biting. Video camera at location appears to be mechanical fall in nature per niece. Otherwise well right now with no major complaints. Overnight events: No acute events overnight. Patient seen and examined at been side. Patient more confused when rounding. Patient denies shortness of breath, chest pain, chills, nausea, vomiting She denies urinary frequency, dysuria. Patient is hemodynamically stable and afebrile She remains on SCDs    Xray  Right elbow did not show any acute osseous abnormality  Lucency in the right femoral head neck junction could be artifactual but a subcapital fracture is difficult to exclude. CT recommended.   CT head stable        Past Medical History:      Diagnosis Date    Allergic rhinitis     GERD (gastroesophageal reflux disease)     Hyperlipidemia     Laceration of head 06/23/2016      Past Surgical History:        Procedure Laterality Date    APPENDECTOMY      COLONOSCOPY  4-2011    FOREARM SURGERY Left 8/10/2020    OPEN REDUCTION INTERNAL FIXATION LEFT DISTAL RADIUS with mini c-arm performed by Angy Gupta MD at [facility] [address]  8/2010    left Ulna    KNEE SURGERY      left torn meniscus     Current Medications:     levETIRAcetam  500 mg IntraVENous Q12H        Social History:     reports that she has never smoked. She has quit using smokeless tobacco. She reports that she does not drink alcohol and does not use drugs. Family History:   Family History   Problem Relation Age of Onset    Arthritis Mother     Heart Attack Father        REVIEW OF SYSTEMS:    A 10 point review of systems was conducted, significant findings as noted in HPI.     Objective:   PHYSICAL EXAM:    Vitals:   T-max:  Patient Vitals for the past 8 hrs:   BP Temp Temp src Pulse Resp SpO2   05/31/22 0630 127/69 -- -- 51 19 --   05/31/22 0615 (!) 71/36 -- -- 55 18 --   05/31/22 0600 135/70 -- -- 54 18 --   05/31/22 0545 136/61 -- -- 53 20 --   05/31/22 0530 126/69 -- -- 56 15 --   05/31/22 0515 (!) 159/75 -- -- 61 15 --   05/31/22 0500 (!) 102/43 -- -- 55 17 --   05/31/22 0445 (!) 100/34 -- -- 51 17 --   05/31/22 0430 117/88 -- -- 54 19 93 %   05/31/22 0415 121/86 -- -- 55 15 96 %   05/31/22 0400 (!) 123/104 97.3 °F (36.3 °C) Temporal 61 16 99 %   05/31/22 0345 (!) 123/55 -- -- 52 17 97 %   05/31/22 0330 123/66 -- -- 56 19 --   05/31/22 0315 (!) 113/102 -- -- 54 20 --   05/31/22 0300 (!) 128/56 -- -- 52 17 --   05/31/22 0255 128/61 -- -- 55 21 --   05/31/22 0245 -- -- -- 54 21 --   05/31/22 0230 (!) 182/160 -- -- 55 17 96 %   05/31/22 0200 -- -- -- 57 15 --   05/31/22 0100 117/74 -- -- 56 15 --   05/31/22 0000 115/60 97.1 °F (36.2 °C) Temporal 61 17 --   05/30/22 2345 (!) 116/47 -- -- 57 17 --   05/30/22 2330 (!) 108/40 -- -- 53 18 --   05/30/22 2315 (!) 104/55 -- -- 56 19 --   05/30/22 2300 (!) 112/47 -- -- 57 18 --       Intake/Output Summary (Last 24 hours) at 5/31/2022 0654  Last data filed at 5/31/2022 0400  Gross per 24 hour   Intake --   Output 700 ml   Net -700 ml Physical Exam  Constitutional:       Appearance: Normal appearance. HENT:      Head: Normocephalic. Mouth/Throat:      Mouth: Mucous membranes are moist.   Eyes:      Extraocular Movements: Extraocular movements intact. Conjunctiva/sclera: Conjunctivae normal.      Pupils: Pupils are equal, round, and reactive to light. Cardiovascular:      Rate and Rhythm: Normal rate and regular rhythm. Pulses: Normal pulses. Heart sounds: Normal heart sounds. Pulmonary:      Effort: Pulmonary effort is normal.      Breath sounds: Normal breath sounds. Abdominal:      General: Abdomen is flat. Bowel sounds are normal.      Palpations: Abdomen is soft. Musculoskeletal:         General: Normal range of motion. Cervical back: Normal range of motion. Skin:     Comments: Mild abrasion on the right shoulder and large echymossis on right gluteus     Neurological:      General: No focal deficit present. Mental Status: She is alert and oriented to person, place, and time. Mental status is at baseline. Psychiatric:         Mood and Affect: Mood normal.         Behavior: Behavior normal.       IV:   niCARdipine 2.5 mg/hr (05/30/22 1627)       LABS:    CBC:   Recent Labs     05/30/22  1317 05/31/22  0616   WBC 4.8 6.3   HGB 12.7 12.0   HCT 37.8 36.5    199   MCV 95.8 96.5     Renal:    Recent Labs     05/30/22  1317      K 4.4      CO2 22   BUN 18   CREATININE 1.2   GLUCOSE 124*   CALCIUM 9.1   ANIONGAP 15     Hepatic:   Recent Labs     05/30/22  1317   AST 19   ALT 11   BILITOT 0.3   PROT 6.7   LABALBU 4.2   ALKPHOS 43     Troponin:   Recent Labs     05/30/22  1317   TROPONINI <0.01     BNP: No results for input(s): BNP in the last 72 hours. Lipids: No results for input(s): CHOL, HDL in the last 72 hours. Invalid input(s): LDLCALCU, TRIGLYCERIDE  ABGs:  No results for input(s): PHART, PSA9OMA, PO2ART, HJG8BRC, BEART, THGBART, E3OHXJVF, XKT7VTQ in the last 72 hours. INR:   Recent Labs     05/30/22  1317   INR 1.03     Lactate: No results for input(s): LACTATE in the last 72 hours. Cultures:  -----------------------------------------------------------------  RAD:   XR SHOULDER RIGHT (MIN 2 VIEWS)   Final Result     No acute findings in the right shoulder. CT HEAD WO CONTRAST   Final Result   1. Questionable minimal improvement in the subarachnoid    hemorrhage, as detailed above. 2.  The intraparenchymal focus involving the right high    frontoparietal region is stable in appearance and morphology. No    new interval intracranial hemorrhage identified. 3.  No significant new mass effect or midline shift. XR HIP 2-3 VW W PELVIS RIGHT   Final Result      Lucency in the right femoral head neck junction could be artifactual but a subcapital fracture is difficult to exclude. CT recommended. XR ELBOW RIGHT (MIN 3 VIEWS)   Final Result      No evidence of fracture. CT head without contrast   Final Result      1. Increase in area of the right frontal and temporal subarachnoid hemorrhage. 2.  Slightly increased size of the right frontal convexity acute subdural hematoma. 3.  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 4.  Stable high right frontal lobe hemorrhagic cortical contusions. 5.  No new mass effect or shift.             Assessment/Plan   Raymond Cooper is a 80 y.o. female, who has a PMH of: admitted to the hospital after a fall     Right subarachnoid hemorrhage  -Extends into the right sylvian fissure of the right temporal region.  With right frontal lobe intraparenchymal hemorrhage  -Mild right frontal subdural hematoma  -Not on blood thinners but on aspirin 325  -CT head stable      -CT head wo contrast  -PT/OT/SLP on board with neurosurgery and neurology  - Q1H neurochecks, HOB >30, SBP <160  -Continue on Keppra 500 twice daily per NSGY  - If CTA shows signs of aneurysm will need to contact Vascular neurosurgery for possible intervention  -Orthostatic blood pressures when able to stand  -F/U TSH, lipid panel, HgA1c, U/A  - Discontinue cardene drip for SBP goal     Fall  - Recommend Rolator walker on discharge. - Xray  Right elbow did not show any acute osseous abnormality  -Xray Right hip:   Lucency in the right femoral head neck junction could be artifactual but a subcapital fracture is difficult to exclude. -CT R hip ordered     HTN  -No home meds, will start antihypertensive as necessary  - See above for cardene. If continues to require cardene late tomorrow will consider starting lisinopril 5mg    Code Status: Limited x 4  FEN: ADULT DIET; Regular; 4 carb choices (60 gm/meal)  PPX: SCDs  DISPO: [facility]     Ben Bill MD, PGY-1  05/31/22  6:54 AM    This patient will be staffed and discussed with  Kodi Cabello MD.     Patient examined, findings as discussed with Dr. Zainab Brenner. Agree with assessment and plan. Subarachnoid hemorrhage secondary to trauma from mechanical fall is stable. She is exhibiting some confusion that is likely related to hospitalization with frequent neuro status checks. We can decrease the frequency of neurochecks, allow more normal sleep pattern. CT head shows a nondisplaced femoral neck fracture. Discussed with neurosurgery service.   She is cleared to transfer to [address] [place] [address]

## 2022-05-31 NOTE — PLAN OF CARE
I have spoken with my partner, Dr. Fatemeh Cerrato from [facility] regarding this patient and have reviewed the chart and images at her request. Imaging shows soft tissue injury to the scalp overlying a cortical SAH. CTA shows no vascular lesion. These findings are consistent with a traumatic SAH. Briefly, patient is appropriate for admission for closed head injury after a mechanical fall. The immediate plan of care will involve medical supportive care including Keppra for 7 days per trauma practice.

## 2022-05-31 NOTE — PROGRESS NOTES
Speech Language Pathology  Facility/Department: [address] [facility]   CLINICAL BEDSIDE SWALLOW EVALUATION    NAME: Donna Malone  : 1934  MRN: [id number]    ADMISSION DATE: 2022  ADMITTING DIAGNOSIS: has Hyperlipidemia; GERD (gastroesophageal reflux disease); Closed fracture of left distal radius; Other secondary hypertension; and Subarachnoid bleed ([address] on their problem list.  ONSET DATE: 2022    Recent Chest Xray: (2022)     Impression   No significant findings in the chest.           CT Head: (2022)  Impression       Minimal progression in extra-axial hemorrhage in the right vertex as well as tiny amount of layering intraventricular hemorrhage.       Otherwise, intra-axial and extra-axial hemorrhage are without change. Date of Eval: 2022  Evaluating Therapist: BRENT Birmingham    Current Diet level:  Current Diet : Regular      Primary Complaint  Patient Complaint: Did not clearly state    Pain:  Pain Assessment  Pain Assessment: None - Denies Pain  Pain Level: 0  Patient's Stated Pain Goal: 0 - No pain  Non-Pharmaceutical Pain Intervention(s): Repositioned,Rest,Emotional support  Pain Assessment in Advanced Dementia (PAINAD)  Non-Pharmaceutical Pain Intervention(s): Repositioned,Rest,Emotional support  Faces, Legs, Activity, Cry, and Consolability (FLACC)  Non-Pharmaceutical Pain Intervention(s): Repositioned,Rest,Emotional support    Reason for Referral  Donna Malone was referred for a bedside swallow evaluation to assess the efficiency of her swallow function, identify signs and symptoms of aspiration and make recommendations regarding safe dietary consistencies, effective compensatory strategies, and safe eating environment. Impression  Dysphagia Diagnosis: No clinical indicators of dysphagia  Dysphagia Impression : Grossly functional swallow, would benefit from ongoing monitoring. Unable to complete oral mech exam as pt with inconsistent direction following. With patient seated up in bed, assessed tolerance ice chips, thins via tsp/straw, puree, soft solid, and regular solid. Pt with positive oral acceptance, timely swallow movement, good oral clearance, no overt signs of aspiration or penetration. Recommend continue regular texture diet and thin liquids, as tolerated. Dysphagia Outcome Severity Scale: Level 6: Within functional limits/Modified independence     Treatment Plan  Requires SLP Intervention: Yes  Duration of Treatment: 1-2 wks or LOS  D/C Recommendations: Ongoing speech therapy is recommended during this hospitalization       Recommended Diet and Intervention  Regular solids and thin liquids  Recommended Form of Meds: PO  Recommendations: Dysphagia treatment  Therapeutic Interventions: Diet tolerance monitoring;Patient/Family education    Compensatory Swallowing Strategies  Compensatory Swallowing Strategies : Upright as possible for all oral intake;Small bites/sips; Alternate solids and liquids;Eat/Feed slowly    Treatment/Goals  Short-term Goals  Timeframe for Short-term Goals: 1-2 wks or LOS  Goal 1: Patient will tolerate least restrictive diet without overt signs of aspiration or associated decline in respiratory status. Goal 2: Patient/caregiver will demonstrate understanding of swallowing concerns/recommendations. General  Chart Reviewed: Yes  Comments: Per admitting H&P (05/30/2022): Darryl Chapin is an 51-year-old female presents to the hospital secondary to a fall to the head. PMH includes HTN, history CVA? (lacunar stroke), HLD, baseline confusion. Patient was at [facility] earlier today and was found on the floor with a laceration to the forehead. Ultimately brought to [facility] for CT head was completed showing a right frontal subarachnoid hemorrhage extending into the sylvian fissure. Neurosurgery was consulted recommending Keppra and CTA head and neck and transferred to [facility].  On discussion with the patient has had multiple falls in the last year. Uses a cane but did not have cane when fall occurred. Denies recollection of the fall but no light headedness before, no loss of bowel or bladder after and denies any tongue biting. Video camera at location appears to be mechanical fall in nature per niece. Otherwise well right now with no major complaints'  Subjective  Subjective: Patient awake, alert, resting in bed. Confused. Behavior/Cognition: Alert;Pleasant mood;Confused  Respiratory Status: Room air  O2 Device: None (Room air)  Communication Observation: Dysarthria;Aphasia  Follows Directions: Simple  Dentition: Dentures top;Dentures bottom  Patient Positioning: Upright in bed  Baseline Vocal Quality: Normal  Volitional Cough: Strong  Prior Dysphagia History: None found in chart review. Vision/Hearing  Hearing  Hearing: Exceptions to [facility]  Hearing Exceptions: Hard of hearing/hearing concerns    Oral Motor Deficits  Unable to fully assess, pt not following directions    Oral Phase Dysfunction  WFL     Indicators of Pharyngeal Phase Dysfunction  WFL    Prognosis  Individuals consulted  Consulted and agree with results and recommendations: Patient    Education  Patient Education: Educated pt to purpose of visit, swallow function, recommendations, strategies. Patient Education Response: Verbalizes understanding  Safety Devices in place: Yes  Type of devices: All fall risk precautions in place; Left in bed       Therapy Time  SLP Individual Minutes  Time In: 6026  Time Out: 1034  Minutes: 14     SLP Total Treatment Time  Timed Code Treatment Minutes: 0 Minutes  Total Treatment Time: 14    Plan  Diet Recommendations: continue regular texture diet, thin liquids, meds PO; as tolerated    Discharge Plan:  TBD  Discussed with RN, Sherrell Bejarano prior to visit. Needs within reach.     Electronically Signed by:  Lisandro Ortiz M.A., Richard Mendez   Speech-Language Pathologist  Pager #[phone]    This document will serve as a discharge summary if pt discharges before next treatment.

## 2022-05-31 NOTE — PROGRESS NOTES
Clinical Pharmacy Consult Note    80 y.o. female admitted with Subarachnoid bleed. Pharmacy has been asked by Dr. Mariela Santizo to adjust all drips to normal saline as appropriate based on compatibility to avoid fluid shifts since D5 is osmotically active. The following intermittent IV drips/infusions have been adjusted to saline:  Keppra  Nicardipine    The following medications must remain in D5W due to incompatibility with normal saline:  Amphotericin  Mycophenolate  Nitroprusside  Penicillin G Potassium    Please be aware that patient has D5W ordered as part of hypoglycemia orderset. [facility] will follow daily to ensure all new IVPBs + drips are in NS. Please call with questions.     Ritchie Stephens, PharmD  PGY-1 Pharmacy Resident  [id number]  5/31/2022 8:55 AM

## 2022-06-01 ENCOUNTER — APPOINTMENT (OUTPATIENT)
Dept: CT IMAGING | Age: 87
DRG: 965 | End: 2022-06-01
Attending: INTERNAL MEDICINE
Payer: MEDICARE

## 2022-06-01 LAB
ALBUMIN SERPL-MCNC: 4.1 G/DL (ref 3.4–5)
ANION GAP SERPL CALCULATED.3IONS-SCNC: 7 MMOL/L (ref 3–16)
BUN BLDV-MCNC: 15 MG/DL (ref 7–20)
CALCIUM SERPL-MCNC: 9 MG/DL (ref 8.3–10.6)
CHLORIDE BLD-SCNC: 104 MMOL/L (ref 99–110)
CO2: 26 MMOL/L (ref 21–32)
CREAT SERPL-MCNC: 1.1 MG/DL (ref 0.6–1.2)
ESTIMATED AVERAGE GLUCOSE: 108.3 MG/DL
GFR AFRICAN AMERICAN: 57
GFR NON-AFRICAN AMERICAN: 47
GLUCOSE BLD-MCNC: 90 MG/DL (ref 70–99)
HBA1C MFR BLD: 5.4 %
HCT VFR BLD CALC: 37.8 % (ref 36–48)
HEMOGLOBIN: 12.7 G/DL (ref 12–16)
MAGNESIUM: 2.2 MG/DL (ref 1.8–2.4)
MCH RBC QN AUTO: 32.2 PG (ref 26–34)
MCHC RBC AUTO-ENTMCNC: 33.6 G/DL (ref 31–36)
MCV RBC AUTO: 95.6 FL (ref 80–100)
PDW BLD-RTO: 13 % (ref 12.4–15.4)
PHOSPHORUS: 3.3 MG/DL (ref 2.5–4.9)
PLATELET # BLD: 190 K/UL (ref 135–450)
PMV BLD AUTO: 9.2 FL (ref 5–10.5)
POTASSIUM SERPL-SCNC: 4.5 MMOL/L (ref 3.5–5.1)
RBC # BLD: 3.95 M/UL (ref 4–5.2)
SODIUM BLD-SCNC: 137 MMOL/L (ref 136–145)
WBC # BLD: 6 K/UL (ref 4–11)

## 2022-06-01 PROCEDURE — 97530 THERAPEUTIC ACTIVITIES: CPT

## 2022-06-01 PROCEDURE — 2580000003 HC RX 258

## 2022-06-01 PROCEDURE — 97129 THER IVNTJ 1ST 15 MIN: CPT

## 2022-06-01 PROCEDURE — 97166 OT EVAL MOD COMPLEX 45 MIN: CPT

## 2022-06-01 PROCEDURE — 51701 INSERT BLADDER CATHETER: CPT

## 2022-06-01 PROCEDURE — 92507 TX SP LANG VOICE COMM INDIV: CPT

## 2022-06-01 PROCEDURE — 97162 PT EVAL MOD COMPLEX 30 MIN: CPT

## 2022-06-01 PROCEDURE — 2580000003 HC RX 258: Performed by: NURSE PRACTITIONER

## 2022-06-01 PROCEDURE — 97116 GAIT TRAINING THERAPY: CPT

## 2022-06-01 PROCEDURE — 83735 ASSAY OF MAGNESIUM: CPT

## 2022-06-01 PROCEDURE — 97535 SELF CARE MNGMENT TRAINING: CPT

## 2022-06-01 PROCEDURE — 36415 COLL VENOUS BLD VENIPUNCTURE: CPT

## 2022-06-01 PROCEDURE — 1200000000 HC SEMI PRIVATE

## 2022-06-01 PROCEDURE — 80069 RENAL FUNCTION PANEL: CPT

## 2022-06-01 PROCEDURE — 6360000002 HC RX W HCPCS: Performed by: NURSE PRACTITIONER

## 2022-06-01 PROCEDURE — 70450 CT HEAD/BRAIN W/O DYE: CPT

## 2022-06-01 PROCEDURE — 51798 US URINE CAPACITY MEASURE: CPT

## 2022-06-01 PROCEDURE — 85027 COMPLETE CBC AUTOMATED: CPT

## 2022-06-01 PROCEDURE — 6360000002 HC RX W HCPCS

## 2022-06-01 PROCEDURE — 99232 SBSQ HOSP IP/OBS MODERATE 35: CPT | Performed by: NURSE PRACTITIONER

## 2022-06-01 RX ADMIN — LEVETIRACETAM 500 MG: 100 INJECTION, SOLUTION, CONCENTRATE INTRAVENOUS at 09:54

## 2022-06-01 RX ADMIN — LEVETIRACETAM 500 MG: 100 INJECTION, SOLUTION, CONCENTRATE INTRAVENOUS at 19:59

## 2022-06-01 ASSESSMENT — PAIN SCALES - GENERAL
PAINLEVEL_OUTOF10: 0

## 2022-06-01 NOTE — PROGRESS NOTES
Physical Therapy  Facility/Department: [facility] ICU  Physical Therapy Initial Assessment / treatment    Name: Socorro Connelly  : 1934  MRN: [id number]  Date of Service: 2022    Discharge Recommendations: Socorro Connelly scored a 17/24 on the AM-PAC short mobility form. Current research shows that an AM-PAC score of 17 or less is typically not associated with a discharge to the patient's home setting. Based on the patient's AM-PAC score and their current functional mobility deficits, it is recommended that the patient have 3-5 sessions per week of Physical Therapy at d/c to increase the patient's independence. Please see assessment section for further patient specific details. If patient discharges prior to next session this note will serve as a discharge summary. Please see below for the latest assessment towards goals. PT Equipment Recommendations  Equipment Needed: No  Other: defer to next level of care      Patient Diagnosis(es): There were no encounter diagnoses. Past Medical History:  has a past medical history of Allergic rhinitis, GERD (gastroesophageal reflux disease), Hyperlipidemia, and Laceration of head. Past Surgical History:  has a past surgical history that includes knee surgery; Appendectomy; fracture surgery (2010); Colonoscopy (); and Forearm surgery (Left, 8/10/2020). Assessment   Body Structures, Functions, Activity Limitations Requiring Skilled Therapeutic Intervention: Decreased functional mobility ; Decreased cognition;Decreased balance  Assessment: Pt is 80 y.o. female admit with SAH and R hip fx. Plan is for conservative management of hip fx. Pt tolerated session fairly well overall. Pt was educated on WB restrictions (25-50% WB on R LE) - needs occasional cues for compliance. Max cues needed for safe mobility with use of walker. Will trial rolling walker at future visit as indicated.   Pt demos cognitive limitations and will need repeated practice for amb with walker which is new to her. Rec continued IP PT to maximize safety and decrease risk for falls. Will follow. Treatment Diagnosis: impaired gait and transfers  Therapy Prognosis: Good  Decision Making: Medium Complexity  Requires PT Follow-Up: Yes     Plan   Plan  Current Treatment Recommendations: Functional mobility training,Balance training,Transfer training,Therapeutic activities,Patient/Caregiver education & training,Equipment evaluation, education, & procurement  Safety Devices  Type of Devices: Left in chair,Call light within reach,Chair alarm in place,Nurse notified,Gait belt     Restrictions  Position Activity Restriction  Other position/activity restrictions: PWB 25-50% RLE per RN discussion with ortho, up with assist     Subjective   General  Chart Reviewed: Yes  Additional Pertinent Hx: Admit 5/30 s/p fall at [address] [facility]; neurosurg consulted; (+) traumatic SAH; R hip MRI: (+) Nondisplaced fracture in the right greater trochanter; ortho consulted - conservative management; PMHx: GERD, L forearm surgery 2020, L knee surgery, multiple falls per pt report  Referring Practitioner: Rambo Ghosh, DO  Diagnosis: SAH, R hip fx  Subjective  Subjective: Pt found supine in bed. Smiling and agrees to PT. Denies pain. Pt indicates she is familiar with this writer, however, this is the first interaction.          Social/Functional History  Social/Functional History  Lives With: Alone  Type of Home: House  Home Layout: Multi-level,Able to Live on Main level with bedroom/bathroom  Home Access: Ramped entrance  Bathroom Shower/Tub: Walk-in shower  Bathroom Toilet: Handicap height  Bathroom Equipment: Grab bars in shower,Shower chair,Grab bars around toilet  Home Equipment: Who What Wear  Has the patient had two or more falls in the past year or any fall with injury in the past year?: Yes  ADL Assistance: [address]: Needs assistance (pt unclear who / helps her at home)  Ambulation Assistance: Needs assistance (cane in/ outside)  Transfer Assistance: Independent  Occupation: Retired  Additional Comments: inconsistant information/ historian. Pt gives varying levels of assist / helpers  with PLOF / house setup  Vision/Hearing  Vision  Vision: Impaired  Vision Exceptions: Wears glasses at all times (wears glasses but they aren't here)  Hearing  Hearing: Within functional limits    Cognition   Orientation  Overall Orientation Status: Impaired  Orientation Level: Oriented to person;Oriented to place; Disoriented to situation;Disoriented to time  Cognition  Overall Cognitive Status: Exceptions  Arousal/Alertness: Delayed responses to stimuli  Following Commands: Follows one step commands with repetition; Follows one step commands with increased time  Attention Span: Difficulty attending to directions  Memory: Decreased short term memory;Decreased recall of recent events;Decreased recall of precautions  Safety Judgement: Decreased awareness of need for assistance;Decreased awareness of need for safety  Insights: Not aware of deficits  Initiation: Requires cues for all  Sequencing: Requires cues for some  Cognition Comment: Slow mentation noted     Objective         Gross Assessment  AROM: Within functional limits  Strength: Generally decreased, functional                    Bed mobility  Supine to Sit: Contact guard assistance (HOB elevated, effortful)  Scooting: Contact guard assistance  Bed Mobility Comments: v/c given for sequencing  Transfers  Sit to Stand: Contact guard assistance (from EOB and toilet)  Stand to sit: Contact guard assistance  Comment: max v/c for safety with transfers  Ambulation  Device: Standard Walker  Assistance: Minimal assistance  Quality of Gait: slow, effortful, cues for WB status, mod to max cues for safety with walker and LE sequencing, flexed trunk  Distance: 10 ft; 30 ft     Balance  Sitting - Static: Good  Sitting - Dynamic: Fair  Standing - Static: Fair  Standing - Dynamic: Fair OutComes Score                                                  AM-PAC Score  AM-PAC Inpatient Mobility Raw Score : 17 (06/01/22 1415)  AM-PAC Inpatient T-Scale Score : 42.13 (06/01/22 1415)  Mobility Inpatient CMS 0-100% Score: 50.57 (06/01/22 1415)  Mobility Inpatient CMS G-Code Modifier : CK (06/01/22 1415)          Goals  Short Term Goals  Time Frame for Short term goals: discharge  Short term goal 1: Pt will transfer supine <--> sit with supervision with HOB flat  Short term goal 2: Pt will transfer sit <--> stand with supervision  Short term goal 3: Pt will amb 50 ft with walker and supervision  Short term goal 4: Pt will verbalize good understanding of R LE WB status  Patient Goals   Patient goals : eventually return home       Education  Patient Education  Education Given To: Patient  Education Provided: Role of Therapy;Precautions  Education Provided Comments: 25-50% WB R LE  Education Method: Verbal;Demonstration  Barriers to Learning: Cognition  Education Outcome: Continued education needed;Verbalized understanding      Therapy Time   Individual Concurrent Group Co-treatment   Time In 1320         Time Out 1400         Minutes 40                 Timed Code Treatment Minutes:  25    Total Treatment Minutes:  40    If patient is discharged prior to next treatment, this note will serve as the discharge summary.   Kenna Retana, PT, DPT  [id number]

## 2022-06-01 NOTE — PLAN OF CARE
Patient sustained unwitnessed fall and injury to head, was transferred from [address] [place] [address] to [facility] for neuro eval and care. Upon admission patient complained of right hip pain. Xrays and CT suspicious for non displaced greater trochanter fracture. Ortho consulted for evaluation. Patient is confused at baseline per Epic.      Right hip/AP pelvis xrays and CT scan reviewed-   suspicious for non displaced greater trochanter fracture with extension into neck     Assessment: right hip non displaced greater trochanter fracture  -ordered MRI to assess for fracture extension to neck or IT region  -bedrest   -NPO after midnight for possible OR depending on MRI results  -full consult to follow      Tish Burr DO    Orthopaedic Surgeon    [address] [facility] [address] [address] [place] [address]  Office: [phone]  Call Center: [phone]  Available on [email or web address]

## 2022-06-01 NOTE — CONSULTS
Inpatient Consultation    Neel Mcmanus (1934)  6/1/2022 Date of consult    Reason for Consult:  Nondisplaced fracture the cortex of the greater trochanter  Requesting Physician: José Gray MD    CHIEF COMPLAINT:  As above    History Obtained From:  patient, daughter    HISTORY OF PRESENT ILLNESS:                The patient is a 80 y.o. female who presents with above chief complaint. Patient states on 5/30/22 she was at a local laundry mat and she had dropped her clothes off at the door and was walking back to her car when she tripped and lost consciousness. Patient is able to recall tripping, but is unsure of the events that happened after. Patient states the next thing she remembers is waking up in the hospital. [facility] due to her subarachnoid hemorrhage and needing neurology. Patient states she has not experienced right discomfort throughout this process. Patient denies any numbness and tingling throughout her right lower extremity. Patient states she is assuming that XRAYS were obtained of her right hip due to the amount of ecchymosis that is present. Patient states she is still without right hip discomfort. Denies any previous injury to the right hip. Patient at baseline ambulates with a cane or walker. States she uses a cane when around the house and switches to the walker when going out of the house. Patient lives at home by herself. Lives in a two story home but states she only utilizes the first floor as it has a bathroom and her bedroom on the first floor. Daughter present at bedside.         Past Medical History:        Diagnosis Date    Allergic rhinitis     GERD (gastroesophageal reflux disease)     Hyperlipidemia     Laceration of head 06/23/2016       Past Surgical History:        Procedure Laterality Date    APPENDECTOMY      COLONOSCOPY  4-2011    FOREARM SURGERY Left 8/10/2020    OPEN REDUCTION INTERNAL FIXATION LEFT DISTAL RADIUS with mini c-arm performed by Lore Moncada MD at [facility]  8/2010    left Ulna    KNEE SURGERY      left torn meniscus       Current Medications:   Current Facility-Administered Medications: hydrALAZINE (APRESOLINE) injection 10 mg, 10 mg, IntraVENous, Q6H PRN  niCARdipine (CARDENE) 25 mg in sodium chloride 0.9 % 250 mL infusion, 2.5-15 mg/hr, IntraVENous, Continuous  acetaminophen (TYLENOL) tablet 650 mg, 650 mg, Oral, Q4H PRN  ondansetron (ZOFRAN-ODT) disintegrating tablet 4 mg, 4 mg, Oral, Q8H PRN **OR** ondansetron (ZOFRAN) injection 4 mg, 4 mg, IntraVENous, Q6H PRN  levETIRAcetam (KEPPRA) 500 mg in sodium chloride 0.9 % 100 mL IVPB, 500 mg, IntraVENous, Q12H  perflutren lipid microspheres (DEFINITY) injection 1.65 mg, 1.5 mL, IntraVENous, ONCE PRN    Allergies:  Levofloxacin and Atorvastatin    Social History:   TOBACCO:   reports that she has never smoked. She has quit using smokeless tobacco.  ETOH:   reports no history of alcohol use. DRUGS:   reports no history of drug use. Family History:       Problem Relation Age of Onset    Arthritis Mother     Heart Attack Father        REVIEW OF SYSTEMS:    CONSTITUTIONAL:  negative  HEENT:  negative  RESPIRATORY:  negative  CARDIOVASCULAR:  Negative  MSK: Positive for edema, ecchymosis    PHYSICAL EXAM:      General: alert, appears stated age and cooperative   Skin: Skin intact, no abrasions present. Edema and ecchymosis present on over greater trochanter   Extremity: Distal NVI   DVT Exam: No evidence of DVT seen on physical exam.  Negative Indy's sign. No cords or calf tenderness.      RLE: + dorsal pedis and posterior tibial pulse palpable, +sensation intact to light touch L4-S1, thigh and calf compartments soft and compressible, motor function intact ehl, df, pf.   Good cap refill <2 sec        DATA:        CBC with Differential:    Lab Results   Component Value Date    WBC 6.0 06/01/2022    RBC 3.95 06/01/2022 HGB 12.7 06/01/2022    HCT 37.8 06/01/2022     06/01/2022    MCV 95.6 06/01/2022    MCH 32.2 06/01/2022    MCHC 33.6 06/01/2022    RDW 13.0 06/01/2022    LYMPHOPCT 21.9 05/30/2022    MONOPCT 9.0 05/30/2022    BASOPCT 0.5 05/30/2022    MONOSABS 0.4 05/30/2022    LYMPHSABS 1.0 05/30/2022    EOSABS 0.2 05/30/2022    BASOSABS 0.0 05/30/2022     CMP:    Lab Results   Component Value Date     06/01/2022    K 4.5 06/01/2022    K 4.4 05/30/2022     06/01/2022    CO2 26 06/01/2022    BUN 15 06/01/2022    CREATININE 1.1 06/01/2022    GFRAA 57 06/01/2022    GFRAA 56 05/31/2013    AGRATIO 1.7 05/30/2022    LABGLOM 47 06/01/2022    GLUCOSE 90 06/01/2022    PROT 6.7 05/30/2022    PROT 6.7 05/16/2012    CALCIUM 9.0 06/01/2022    BILITOT 0.3 05/30/2022    ALKPHOS 43 05/30/2022    AST 19 05/30/2022    ALT 11 05/30/2022     BMP:    Lab Results   Component Value Date     06/01/2022    K 4.5 06/01/2022    K 4.4 05/30/2022     06/01/2022    CO2 26 06/01/2022    BUN 15 06/01/2022    CREATININE 1.1 06/01/2022    CALCIUM 9.0 06/01/2022    GFRAA 57 06/01/2022    GFRAA 56 05/31/2013    LABGLOM 47 06/01/2022    GLUCOSE 90 06/01/2022         Radiology:     @  CT HEAD WO CONTRAST   Final Result      Essentially stable multifocal intracranial hemorrhage, subarachnoid, subdural and intraparenchymal.      MRI HIP RIGHT WO CONTRAST   Final Result      1. Nondisplaced fracture in the right greater trochanter. 2.  Edema/contusion in the subcutaneous tissues over the right hip. CT HIP RIGHT WO CONTRAST   Final Result   1. Nondisplaced fracture the cortex of the greater trochanter and irregularity of the femoral neck with underlying osteoporosis. Occult fracture and contusion are considered. Further evaluation with magnetic resonance imaging is recommended to assess the    degree of marrow edema. 2. Underlying osteoarthritis of the femoral acetabular joint.       CT HEAD WO CONTRAST   Final Result Minimal progression in extra-axial hemorrhage in the right vertex as well as tiny amount of layering intraventricular hemorrhage. Otherwise, intra-axial and extra-axial hemorrhage are without change. XR SHOULDER RIGHT (MIN 2 VIEWS)   Final Result     No acute findings in the right shoulder. CT HEAD WO CONTRAST   Final Result   1. Questionable minimal improvement in the subarachnoid    hemorrhage, as detailed above. 2.  The intraparenchymal focus involving the right high    frontoparietal region is stable in appearance and morphology. No    new interval intracranial hemorrhage identified. 3.  No significant new mass effect or midline shift. XR HIP 2-3 VW W PELVIS RIGHT   Final Result      Lucency in the right femoral head neck junction could be artifactual but a subcapital fracture is difficult to exclude. CT recommended. XR ELBOW RIGHT (MIN 3 VIEWS)   Final Result      No evidence of fracture. CT head without contrast   Final Result      1. Increase in area of the right frontal and temporal subarachnoid hemorrhage. 2.  Slightly increased size of the right frontal convexity acute subdural hematoma. 3.  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 4.  Stable high right frontal lobe hemorrhagic cortical contusions. 5.  No new mass effect or shift. IMPRESSION/RECOMMENDATIONS:    Assessment: Nondisplaced fracture the cortex of the greater trochanter    Plan:  1) Imaging findings reviewed with patient and family. After discussion today with the family and patient they elected to proceed with conservative intervention. All questions were answered appropriately, and they understand that they can contact me at any time to further discuss any questions or concerns.  I recommend perusing conservative treatment and allowing the patient to be 25-50% weightbearing on the right leg  2) Continue with OT/PT throughout the course of her hospital stay and would benefit from outpatient physical therapy once D/C - 25-50% WB  2) Continue pain control as needed  3) Ortho stable for D/C  4) F/U with Dr Eladia Sheffield in 3-4 weeks at [address] ; call [phone] to schedule      Thank you for the opportunity to consult on this patient. SANJU Lin     I have discussed the case and reviewed all pertinent data with the Physician Assistant, and agree with the plan noted above.      Hua Estevesr, DO

## 2022-06-01 NOTE — PROGRESS NOTES
Pt alert, disoriented. Able to say her name and why she is here. Pt state she needs to use bathroom, unable to urinate. Bladder scan shows 716ml in bladder. Straight cath per order. MRI called, transport called. VSS, will continue to monitor.

## 2022-06-01 NOTE — PROGRESS NOTES
Neurosurgery Progress Note       Patient Name: Raymond Cooper YOB: 1934   Sex: Female Age: 80 yrs     CC / Reason for Consult: mild TBI    Changes over last 24 hours: Patient doing well, resting in bed  Follow up head Ct completed this morning is stable       ROS: denies headache or vision changes  HISTORY     PMH Past Medical History:   Diagnosis Date    Allergic rhinitis     GERD (gastroesophageal reflux disease)     Hyperlipidemia     Laceration of head 06/23/2016      Allergies Allergies   Allergen Reactions    Levofloxacin Nausea Only    Atorvastatin Other (See Comments)      Diet Diet NPO Exceptions are: Sips of Water with Meds   Isolation No active isolations     LABS   Metabolic Panel Recent Labs     05/30/22  1317 05/31/22  0616 06/01/22  0713    141 137   K 4.4 4.4 4.5    103 104   CO2 22 26 26   BUN 18 16 15   CREATININE 1.2 1.2 1.1   GLUCOSE 124* 97 90   CALCIUM 9.1 9.4 9.0   LABALBU 4.2 4.3 4.1   PHOS  --  3.9 3.3   MG  --  2.30 2.20   ALKPHOS 43  --   --    ALT 11  --   --    AST 19  --   --       CBC / Coags Recent Labs     05/30/22  1317 05/31/22  0616 06/01/22  0713   WBC 4.8 6.3 6.0   RBC 3.95* 3.78* 3.95*   HGB 12.7 12.0 12.7   HCT 37.8 36.5 37.8    199 190   INR 1.03  --   --       Other Lab Results   Component Value Date    LABA1C 5.4 05/31/2022    LDLCALC 119 05/31/2022    TRIG 58 05/31/2022    TSH 4.67 08/28/2018    COVID19 Not Detected 08/10/2020     Lab Results   Component Value Date    LACTA 0.9 05/30/2022          CURRENT SCHEDULED MEDICATIONS   Inpatient Medications   levETIRAcetam, 500 mg, IntraVENous, Q12H   Infusions    niCARdipine 2.5 mg/hr (05/30/22 1627)      Antibiotics   Recent Abx Admin      No antibiotic orders with administrations found. DIAGNOSTICS   IMAGES:  Images personally reviewed and agree w/ radiology interpretation.   Impression       Essentially stable multifocal intracranial hemorrhage, subarachnoid, subdural and intraparenchymal         PHYSICAL EXAMINATION     PHYSICAL EXAM:  Vitals:    06/01/22 0300 06/01/22 0400 06/01/22 0500 06/01/22 0600   BP:  (!) 150/65     Pulse: 53 53 53 51   Resp: 18 16 15 15   Temp:  98.1 °F (36.7 °C)     TempSrc:  Temporal     SpO2:  98%     Weight:       Height:             General: Alert, no distress, well-nourished  Neurologic  Mental status:   Orientation: A&O x 4  Attention: Intact    Language: fluent  Speech: clear  Comprehension intact; follows simple commands    Cranial nerves:   CN2: Visual fields full w/o extinction on confrontational testing   CN 3,4,6: Pupils equal and reactive to light, extraocular muscles intact  CN5: Facial sensation symmetric   CN7: Face symmetric  CN8: Hearing symmetric to spoken voice  CN9: Palate elevated symmetrically  CN11: Traps full strength on shoulder shrug  CN12: Tongue midline with protrusion    Motor Exam:  Good strength throughout  Limited assessment of R leg d/t pain from hip fx but has good dorsi / planter flexion      Sensory: light touch intact and symmetric in all 4 extremities  Tone: normal in all 4 extremities      OTHER SYSTEMS:  Cardiovascular: Warm, appears well perfused   Respiratory: Easy, non-labored respiratory pattern   Abdominal: Abdomen is without distention   Extremities: Upper and lower extremities are atraumatic in appearance without deformity. ASSESSMENT & RECOMMENDATIONS   Patient is an 79 y/o F w/ mild TBI s/p fall (w/ scattered tSAH & very small traumatic ICH) - imaging stable on repeat CT this morning. Neurologic exam stable     Neuro checks Q4H  Activity / Diet as tolerates  SBP <180  Hold antiplts / full dose anticoagulants x 2 weeks  PT/OT for dizziness / imbalance  SLP for cognitive evaluation   Headaches: Tylenol 650mg PO Q6H PRN  Dizziness: Meclizine 25mg PO TID PRN  Nausea: Zofran 4mg IV Q6H PRN.  Consider scopolamine patch for persistent nausea  Will require 24 hour supervision for first 48 hours after discharge  Mild TBI education prior to discharge   Okay from neurosurgery standpoint to have surgery w/ ortho for hip replacement. Be mindful of blood pressure control during surgical procedure. If any change in neuro exam post op, please obtain head CT. We will sign off.  Please call with questions  Follow-up w/ neurosurgery in 2 weeks with repeat head CT        DAMI Sheridan - CNP   Neurosurgery Nurse Practitioner  06/01/22  9:38 AM

## 2022-06-01 NOTE — PROGRESS NOTES
Occupational Therapy/ Physical Therapy  D/c Note     Pt's MRI + for Nondisplaced fracture in the right greater trochanter. Pt pending surgical intervention this date. Will sign off from OT /PT service and await new orders when appropriate per ortho team.  RN aware and in agreement with plan.       Eric Meier  MS, OTR/L #[id number]   Karen Pelletier, PT, DPT  [id number]

## 2022-06-01 NOTE — CONSULTS
NEUROSURGERY CONSULT NOTE    Larissa Gil  [id number]   1934   5/31/2022    Requesting physician: Tyrese eBnton MD    Reason for consultation: Traumatic ICH    History of present illness: Patient is a 80 y.o. female that  has a past medical history of Allergic rhinitis, GERD (gastroesophageal reflux disease), Hyperlipidemia, and Laceration of head (06/23/2016). Patient presented on 5/30/2022 to [facility] ED s/p fall. Patient was at [facility] yesterday and was found on the floor. CT head was completed showing a right frontal subarachnoid hemorrhage extending into the sylvian fissure. Neurosurgery was consulted and transferred to [facility].    ROS:   GENERAL:  Denies fever or recent illness.  Denies weight changes   EYES:  Denies vision change or diplopia  EARS:  Denies hearing loss  CARDIAC:  Denies chest pain  RESPIRATORY:  Denies shortness of breath  SKIN:  Denies rash or lesions   HEM:  Denies excessive bruising  PSYCH:  Denies anxiety or depression  NEURO:  Endorses headache; Denies numbness or tingling or lateralizing weakness   :  Denies urinary difficulty  GI: Denies nausea, vomiting, diarrhea or constipation  MUSCULOSKELETAL: Endorses right hip pain      Allergies   Allergen Reactions    Levofloxacin Nausea Only    Atorvastatin Other (See Comments)       Past Medical History:   Diagnosis Date    Allergic rhinitis     GERD (gastroesophageal reflux disease)     Hyperlipidemia     Laceration of head 06/23/2016        Past Surgical History:   Procedure Laterality Date    APPENDECTOMY      COLONOSCOPY  4-2011    FOREARM SURGERY Left 8/10/2020    OPEN REDUCTION INTERNAL FIXATION LEFT DISTAL RADIUS with mini c-arm performed by Vanessa Watson MD at [address] [id number]  8/2010    left Ulna    KNEE SURGERY      left torn meniscus       Social History     Occupational History    Not on file   Tobacco Use    Smoking status: Never Smoker    Smokeless tobacco: Former User   Vaping Use   [employer] Use: Never used   Substance and Sexual Activity    Alcohol use: No    Drug use: No    Sexual activity: Not on file        Family History   Problem Relation Age of Onset    Arthritis Mother     Heart Attack Father         Outpatient Medications Marked as Taking for the 5/30/22 encounter [facility] Encounter)   Medication Sig Dispense Refill    Calcium-Phosphorus-Vitamin D (CALCIUM GUMMIES PO) Take by mouth          Current Facility-Administered Medications   Medication Dose Route Frequency Provider Last Rate Last Admin    hydrALAZINE (APRESOLINE) injection 10 mg  10 mg IntraVENous Q6H PRN Amy Cates MD        niCARdipine (CARDENE) 25 mg in sodium chloride 0.9 % 250 mL infusion  2.5-15 mg/hr IntraVENous Continuous Cam , DO 25 mL/hr at 05/30/22 1627 2.5 mg/hr at 05/30/22 1627    acetaminophen (TYLENOL) tablet 650 mg  650 mg Oral Q4H PRN Cam , DO        ondansetron (ZOFRAN-ODT) disintegrating tablet 4 mg  4 mg Oral Q8H PRN Cam , DO        Or    ondansetron [facility]) injection 4 mg  4 mg IntraVENous Q6H PRN Cam , DO   4 mg at 05/30/22 1738    levETIRAcetam (KEPPRA) 500 mg in sodium chloride 0.9 % 100 mL IVPB  500 mg IntraVENous Q12H Marylee Elam Rupard, DAMI - CNP   Stopped at 05/31/22 2103    perflutren lipid microspheres (DEFINITY) injection 1.65 mg  1.5 mL IntraVENous ONCE PRN Cam , DO            Objective:  BP (!) 146/95   Pulse 56   Temp 97.8 °F (36.6 °C) (Temporal)   Resp 17   Ht 5' 8\" (1.727 m)   Wt 132 lb 4.4 oz (60 kg)   SpO2 93%   BMI 20.11 kg/m²     Physical Exam:   Patient seen and examined  GCS:  4 - Opens eyes on own  5 - Alert and oriented  6 - Follows simple motor commands  General: Well developed. Alert and cooperative in no acute distress. HENT: atraumatic, neck supple  Eyes: Optic discs: Not tested  Pulmonary: unlabored respiratory effort  Cardiovascular:  Warm well perfused.  No peripheral edema  Gastrointestinal: abdomen soft, NT, ND    Neurological:  Mental Status: Awake, alert, oriented x 4, speech clear and appropriate  Attention: Intact  Language: No aphasia or dysarthria noted  Sensation: Intact to all extremities to light touch  Coordination: Intact    Cranial Nerves:  II: Visual acuity not tested, denies new visual changes / diplopia  III, IV, VI: PERRL, 3 mm bilaterally, EOMI, no nystagmus noted  V: Facial sensation intact bilaterally to touch  VII: Face symmetric  VIII: Hearing intact bilaterally to spoken voice  IX: Palate movement equal bilaterally  XI: Shoulder shrug equal bilaterally  XII: Tongue midline    Musculoskeletal:   Gait: Not tested   Assist devices: None   Tone: Normal  Motor strength: RLE exam deferred 2/2 injury   Right  Left    Right  Left    Deltoid  5 5  Hip Flex  Defer 5   Biceps  5 5  Knee Extensors  Defer 5   Triceps  5 5  Knee Flexors  Defer 5   Wrist Ext  5 5  Ankle Dorsiflex. Defer 5   Wrist Flex  5 5  Ankle Plantarflex. Defer 5   Handgrip  5 5  Ext Ananth Longus  Defer 5   Thumb Ext  5 5         Radiological Findings:    I personally reviewed the patient's imaging which consists of CTs of the head dated 5/30/22. This demonstrates evidence of small sulcal tSAH in the right frontal region. Small hygroma is noted in the left convexity. Labs:  Recent Labs     05/31/22  0616   WBC 6.3   HGB 12.0   HCT 36.5          Recent Labs     05/31/22  0616      K 4.4      CO2 26   BUN 16   CREATININE 1.2   GLUCOSE 97   CALCIUM 9.4   PHOS 3.9   MG 2.30       Recent Labs     05/30/22  1317   PROTIME 13.4   INR 1.03   APTT 26.3       Patient Active Problem List    Diagnosis Date Noted    Closed fracture of neck of right femur (HCC)     Subarachnoid bleed ([facility] [address] 05/30/2022    Other secondary hypertension 12/18/2020    Closed fracture of left distal radius     GERD (gastroesophageal reflux disease)     Hyperlipidemia        Assessment:  Patient is a 80 y.o. female w/traumatic SAH     Plan:  1.  No emergent neurosurgical intervention indicated  2. Neurologic exams frequency: Q4H  3. For change in exam MUST contact neurosurgery team along with critical care or primary team  4. Traumatic ICH & SAH:  - Maintain SBP <160; If PRN med insufficient, then may start Nicardipine infusion  - Hold all full dose anticoagulation & antiplatelet for 2 weeks  - Keep Plt >100k & INR <1.4  - Follow up in 2 weeks with repeat CT Head  5. Cerebral edema:  - Keep HOB >30 degrees  6. PT/OT   7. DVT Prophylaxis: SCD's & OK to start chemoprophylaxis 24 hours after stable CT scan, if medically indicated  8. Appreciate critical care team assistance in management  9. Will follow inpatient.   Please call with any questions or decline in neurological status    Canelo Cole MD, PhD  [address], [address], [address] [phone] (c), [phone] (o)

## 2022-06-01 NOTE — PROGRESS NOTES
Hospitalist Progress Note      PCP: Valeria Campoverde MD    Date of Admission: 5/30/2022    Chief Complaint: Fall to head    Hospital Course: H&P    Subjective: Seen and examined at bedside. Urinary retention >500, needs straight cath. Repeat CT head this AM (6/1) showed stable multifocal intracranial hemorrhage, subarachnoid, subdural and intraparenchymal.  MRI of right hip (6/1/22) showed nondisplaced fracture in the right greater trochanter. Orthopedics recommended conservative treatment, no surgical intervention at this time. PT OT. Neurosurgery signed off with follow-up with neurosurgery in 2 weeks with repeat CT head  Or awake alert today. She is oriented x4 and following commands appropriately. Denies any pain. Medications:  Reviewed    Infusion Medications    niCARdipine 2.5 mg/hr (05/30/22 1627)     Scheduled Medications    levETIRAcetam  500 mg IntraVENous Q12H     PRN Meds: hydrALAZINE, acetaminophen, ondansetron **OR** ondansetron, perflutren lipid microspheres      Intake/Output Summary (Last 24 hours) at 6/1/2022 1510  Last data filed at 6/1/2022 0500  Gross per 24 hour   Intake --   Output 1300 ml   Net -1300 ml       Physical Exam Performed:    BP (!) 161/83   Pulse 56   Temp 98 °F (36.7 °C) (Temporal)   Resp 18   Ht 5' 8\" (1.727 m)   Wt 132 lb 4.4 oz (60 kg)   SpO2 99%   BMI 20.11 kg/m²     General appearance: No apparent distress, appears stated age and cooperative. HEENT: Pupils equal, round, and reactive to light. Conjunctivae/corneas clear. Neck: Supple, with full range of motion. No jugular venous distention. Trachea midline. Respiratory:  Normal respiratory effort. Clear to auscultation, bilaterally without Rales/Wheezes/Rhonchi. Cardiovascular: Regular rate and rhythm with normal S1/S2 without murmurs, rubs or gallops. Abdomen: Soft, non-tender, non-distended with normal bowel sounds. Musculoskeletal: No clubbing, cyanosis or edema bilaterally.   Full range of motion without deformity. Skin: Skin color, texture, turgor normal.  No rashes or lesions. Neurologic:  Neurovascularly intact without any focal sensory/motor deficits. Cranial nerves: II-XII intact, grossly non-focal.  Psychiatric: Oriented x4, following commands appropriately  Capillary Refill: Brisk,3 seconds, normal   Peripheral Pulses: +2 palpable, equal bilaterally       Labs:   Recent Labs     05/30/22  1317 05/31/22  0616 06/01/22  0713   WBC 4.8 6.3 6.0   HGB 12.7 12.0 12.7   HCT 37.8 36.5 37.8    199 190     Recent Labs     05/30/22  1317 05/31/22  0616 06/01/22  0713    141 137   K 4.4 4.4 4.5    103 104   CO2 22 26 26   BUN 18 16 15   CREATININE 1.2 1.2 1.1   CALCIUM 9.1 9.4 9.0   PHOS  --  3.9 3.3     Recent Labs     05/30/22  1317   AST 19   ALT 11   BILITOT 0.3   ALKPHOS 43     Recent Labs     05/30/22  1317   INR 1.03     Recent Labs     05/30/22  1317   TROPONINI <0.01       Urinalysis:      Lab Results   Component Value Date    NITRU Negative 05/30/2022    WBCUA 0-2 05/30/2022    BACTERIA Rare 05/30/2022    RBCUA 0-2 05/30/2022    BLOODU TRACE-INTACT 05/30/2022    SPECGRAV 1.010 05/30/2022    GLUCOSEU Negative 05/30/2022       Radiology:  CT HEAD WO CONTRAST   Final Result      Essentially stable multifocal intracranial hemorrhage, subarachnoid, subdural and intraparenchymal.      MRI HIP RIGHT WO CONTRAST   Final Result      1. Nondisplaced fracture in the right greater trochanter. 2.  Edema/contusion in the subcutaneous tissues over the right hip. CT HIP RIGHT WO CONTRAST   Final Result   1. Nondisplaced fracture the cortex of the greater trochanter and irregularity of the femoral neck with underlying osteoporosis. Occult fracture and contusion are considered. Further evaluation with magnetic resonance imaging is recommended to assess the    degree of marrow edema. 2. Underlying osteoarthritis of the femoral acetabular joint.       CT HEAD WO CONTRAST   Final Result      Minimal progression in extra-axial hemorrhage in the right vertex as well as tiny amount of layering intraventricular hemorrhage. Otherwise, intra-axial and extra-axial hemorrhage are without change. XR SHOULDER RIGHT (MIN 2 VIEWS)   Final Result     No acute findings in the right shoulder. CT HEAD WO CONTRAST   Final Result   1. Questionable minimal improvement in the subarachnoid    hemorrhage, as detailed above. 2.  The intraparenchymal focus involving the right high    frontoparietal region is stable in appearance and morphology. No    new interval intracranial hemorrhage identified. 3.  No significant new mass effect or midline shift. XR HIP 2-3 VW W PELVIS RIGHT   Final Result      Lucency in the right femoral head neck junction could be artifactual but a subcapital fracture is difficult to exclude. CT recommended. XR ELBOW RIGHT (MIN 3 VIEWS)   Final Result      No evidence of fracture. CT head without contrast   Final Result      1. Increase in area of the right frontal and temporal subarachnoid hemorrhage. 2.  Slightly increased size of the right frontal convexity acute subdural hematoma. 3.  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 4.  Stable high right frontal lobe hemorrhagic cortical contusions. 5.  No new mass effect or shift.               Assessment/Plan:    Active Hospital Problems    Diagnosis     Closed fracture of neck of right femur ([facility] [address] [S72.001A]      Priority: Medium    Subarachnoid bleed (HCC) [I60.9]      Priority: Medium     #Subarachnoid hemorrhage secondary to fall-Repeat CT head this AM (6/1) showed stable multifocal intracranial hemorrhage, subarachnoid, subdural and intraparenchymal.  #Closed fracture of the neck of right femoral  #Fall  #Baseline confusion  #Hypertension    Plans:  Keep SBP<180, PT/OT, follow-up with surgery in 2 weeks with repeat head CT. Neurosurgery signed off. Okay to have hip surgery from neuro service point. Continue Keppra 500 twice daily for now  Orthopedics recommended conservative treatment, no surgical intervention at this time. Need SLP eval  PT/OT    DVT Prophylaxis: SCD for now, pending repeat CT head  Diet: ADULT DIET;  Regular  Code Status: Limited    PT/OT Eval Status: ordered    Dispo -inpatient pending orthopedics and PT OT evaluation    Afia Crowder MD

## 2022-06-01 NOTE — PROGRESS NOTES
Speech Language Pathology  Facility/Department: [facility] ICU  Daily Treatment Note  NAME: Laura Mail  : 1934  MRN: [id number]    Date of Eval: 2022  Evaluating Therapist: BRENT Ty    Patient Diagnosis(es): has Hyperlipidemia; GERD (gastroesophageal reflux disease); Closed fracture of left distal radius; Other secondary hypertension; Subarachnoid bleed ([address] and Closed fracture of neck of right femur ([address] on their problem list.  Onset Date: 2022    Primary Complaint:  None stated    Pain:  Pain Assessment  Pain Assessment: None - Denies Pain  Pain Level: 0  Patient's Stated Pain Goal: 0 - No pain  Non-Pharmaceutical Pain Intervention(s): Repositioned,Rest,Emotional support  Pain Assessment in Advanced Dementia (PAINAD)  Non-Pharmaceutical Pain Intervention(s): Repositioned,Rest,Emotional support  Faces, Legs, Activity, Cry, and Consolability (FLACC)  Non-Pharmaceutical Pain Intervention(s): Repositioned,Rest,Emotional support    Recent CT HEAD (22): Impression       Essentially stable multifocal intracranial hemorrhage, subarachnoid, subdural and intraparenchymal.       Chart reviewed. Per admitting H&P (2022): \"Sayda Bhatt is an 41-year-old female presents to the hospital secondary to a fall to the head. PMH includes HTN, history CVA? (lacunar stroke), HLD, baseline confusion. Patient was at [facility] earlier today and was found on the floor with a laceration to the forehead. Ultimately brought to [facility] for CT head was completed showing a right frontal subarachnoid hemorrhage extending into the sylvian fissure. Neurosurgery was consulted recommending Keppra and CTA head and neck and transferred to [facility]. On discussion with the patient has had multiple falls in the last year. Uses a cane but did not have cane when fall occurred. Denies recollection of the fall but no light headedness before, no loss of bowel or bladder after and denies any tongue biting. Video camera at location appears to be mechanical fall in nature per niece. Otherwise well right now with no major complaints\"    Initial Assessment:   Aphasia (R47.01)  Cognitive Communication Deficit (R41.841)  Dysarthria (R47.1)    Treatment diagnosis[de-identified] Communication and cognitive impairment, including the areas of expressive/receptive aphasia (word finding, confrontation naming, divergent naming, yes/no question (60% accuracy), automatics, following directions), some seeming motor speech/dysarthric component, and impaired orientation, recall, attention. Of note, suspect general lethargy/AMS and possible hearing impairmenet may be impacting performance. Recommend ongoing assessment/therapy. Will continue to follow. Medical diagnosis: Subarachnoid bleed ([facility] [address] [I60.9]      Treatment:  22:  Pt NOT seen for dysphagia f/u this date, as pt is \"NPO after midnight for possible OR depending on MRI results. \"    Pt seen to address the following goals:  (Timeframe for Short-term Goals: 1-3x/wk for 1-2 wks or LOS)  Goal 1: Patient will answer yes/no questions with 80% accuracy. 22:  Pt is able to respond to personal yes/no questions with 80% accuracy. Continue goal.    Goal 2: Patient will complete automatics with 100% accuracy. 22:  Goal not directly targeted during this session. Continue goal.    Goal 3: Patient will complete graded naming tasks with 90% accuracy. 22:  Pt requires moderate cues for confrontation and responsive naming tasks. Pt demonstrates anomic and paraphasic errors. Continue goal.    Goal 4: Patient will follow basic directions wtih 90% accuracy. 22:  Goal not directly targeted during this session. Continue goal.    Goal 5: Patient will be oriented x3 with min cues. 22:  Pt is able to recall correct year with minimal assistance; she requires max cues to identify correct month&date. She incorrectly states her .   Continue goal.    Goal 6: Patient will participate in ongoing assessment cognitive-linguistic skills as appropriate. 6/1/22:  Continue goal.    Education:  SLP educated pt re: role of SLP and rationale for treatment tasks. Pt needs reinforcement. Plan:  Continue speech/language therapy to address above goals, 1-3x/week x LOS  DC recommendations:  Suspect pt will require ongoing ST at the next level of care  D/W nursing, [address] met prior to leaving room, call button in reach.   Treatment time: 25 minutes    Electronically Signed by:  Nikki Madison., [address] [id number]  Pager #[phone]  If patient is discharged prior to next treatment, this note will serve as the discharge summary

## 2022-06-01 NOTE — PROGRESS NOTES
Clinical Pharmacy Progress Note    All IVs in NS - Management by Pharmacy    Consult Date(s): 5/30/22  Consulting Provider(s): Dr. Song Hamilton / Plan    R SAH - All IVs in Normal Saline   Drips will be adjusted to normal saline as appropriate based on compatibility, in an effort to avoid fluid shifts, as D5W is osmotically active.  The following intermittent IV drips / infusions have been adjusted to saline:  o Levetiracetam (already in NS)  o Nicardipine (already in NS)   Note: Patient may have dextrose ordered as part of hypoglycemia treatment protocol.  Total IV fluid delivered to patient over last 24 hrs: 200 mL   Pharmacist will follow daily to ensure all IVPBs / drips are in NS where possible. Please call with any questions.     Natalie Barrios PharmD, [facility]  Wireless: [phone]  6/1/2022 8:13 AM

## 2022-06-01 NOTE — PLAN OF CARE
Problem: Discharge Planning  Goal: Discharge to home or other facility with appropriate resources  6/1/2022 1814 by Elidia Ji RN  Outcome: Progressing     Problem: Pain  Goal: Verbalizes/displays adequate comfort level or baseline comfort level  6/1/2022 1814 by Elidia Ji RN  Outcome: Progressing     Problem: Skin/Tissue Integrity  Goal: Absence of new skin breakdown  Description: 1. Monitor for areas of redness and/or skin breakdown  2. Assess vascular access sites hourly  3. Every 4-6 hours minimum:  Change oxygen saturation probe site  4. Every 4-6 hours:  If on nasal continuous positive airway pressure, respiratory therapy assess nares and determine need for appliance change or resting period.   6/1/2022 1814 by Elidia Ji RN  Outcome: Progressing     Problem: Safety - Adult  Goal: Free from fall injury  6/1/2022 1814 by Elidia Ji RN  Outcome: Progressing

## 2022-06-01 NOTE — PLAN OF CARE
Problem: Discharge Planning  Goal: Discharge to home or other facility with appropriate resources  Outcome: Progressing  Flowsheets (Taken 5/31/2022 2000)  Discharge to home or other facility with appropriate resources: Identify barriers to discharge with patient and caregiver     Problem: Pain  Goal: Verbalizes/displays adequate comfort level or baseline comfort level  Outcome: Progressing  Flowsheets (Taken 5/31/2022 2000)  Verbalizes/displays adequate comfort level or baseline comfort level: Encourage patient to monitor pain and request assistance     Problem: Skin/Tissue Integrity  Goal: Absence of new skin breakdown  Description: 1. Monitor for areas of redness and/or skin breakdown  2. Assess vascular access sites hourly  3. Every 4-6 hours minimum:  Change oxygen saturation probe site  4. Every 4-6 hours:  If on nasal continuous positive airway pressure, respiratory therapy assess nares and determine need for appliance change or resting period. Outcome: Progressing     Problem: Safety - Adult  Goal: Free from fall injury  Outcome: Progressing  Flowsheets (Taken 6/1/2022 0122)  Free From Fall Injury: Instruct family/caregiver on patient safety     Problem: ABCDS Injury Assessment  Goal: Absence of physical injury  Outcome: Progressing  Flowsheets (Taken 6/1/2022 0122)  Absence of Physical Injury: Implement safety measures based on patient assessment     Problem: SLP Adult - Impaired Swallowing  Goal: By Discharge: Advance to least restrictive diet without signs or symptoms of aspiration for planned discharge setting. See evaluation for individualized goals. 5/31/2022 1516 by BRENT Doe  Outcome: Progressing     Problem: SLP Adult - Impaired Communication  Goal: By Discharge: Demonstrates communication skills at highest level of function for planned discharge setting. See evaluation for individualized goals.   5/31/2022 1516 by BRENT Doe  Outcome: Progressing     Problem: SLP Adult - Disturbed Thought Process  Goal: By Discharge: Demonstrates cognitive skills at highest level of function for planned discharge setting. See evaluation for individualized goals.   5/31/2022 1516 by BRENT Aguilar  Outcome: Progressing

## 2022-06-02 PROBLEM — I62.9 INTRACRANIAL BLEED (HCC): Status: ACTIVE | Noted: 2022-06-02

## 2022-06-02 LAB
ALBUMIN SERPL-MCNC: 4.1 G/DL (ref 3.4–5)
ANION GAP SERPL CALCULATED.3IONS-SCNC: 12 MMOL/L (ref 3–16)
BUN BLDV-MCNC: 18 MG/DL (ref 7–20)
CALCIUM SERPL-MCNC: 8.9 MG/DL (ref 8.3–10.6)
CHLORIDE BLD-SCNC: 99 MMOL/L (ref 99–110)
CO2: 24 MMOL/L (ref 21–32)
CREAT SERPL-MCNC: 0.9 MG/DL (ref 0.6–1.2)
GFR AFRICAN AMERICAN: >60
GFR NON-AFRICAN AMERICAN: 59
GLUCOSE BLD-MCNC: 100 MG/DL (ref 70–99)
GLUCOSE BLD-MCNC: 93 MG/DL (ref 70–99)
HCT VFR BLD CALC: 38.3 % (ref 36–48)
HEMOGLOBIN: 12.7 G/DL (ref 12–16)
MAGNESIUM: 2 MG/DL (ref 1.8–2.4)
MCH RBC QN AUTO: 32.1 PG (ref 26–34)
MCHC RBC AUTO-ENTMCNC: 33.2 G/DL (ref 31–36)
MCV RBC AUTO: 96.6 FL (ref 80–100)
PDW BLD-RTO: 13.1 % (ref 12.4–15.4)
PERFORMED ON: ABNORMAL
PHOSPHORUS: 3.4 MG/DL (ref 2.5–4.9)
PLATELET # BLD: 184 K/UL (ref 135–450)
PMV BLD AUTO: 9.1 FL (ref 5–10.5)
POTASSIUM SERPL-SCNC: 3.9 MMOL/L (ref 3.5–5.1)
RBC # BLD: 3.96 M/UL (ref 4–5.2)
SODIUM BLD-SCNC: 135 MMOL/L (ref 136–145)
WBC # BLD: 6.8 K/UL (ref 4–11)

## 2022-06-02 PROCEDURE — 36415 COLL VENOUS BLD VENIPUNCTURE: CPT

## 2022-06-02 PROCEDURE — 97530 THERAPEUTIC ACTIVITIES: CPT

## 2022-06-02 PROCEDURE — 92526 ORAL FUNCTION THERAPY: CPT

## 2022-06-02 PROCEDURE — 1200000000 HC SEMI PRIVATE

## 2022-06-02 PROCEDURE — 80069 RENAL FUNCTION PANEL: CPT

## 2022-06-02 PROCEDURE — 85027 COMPLETE CBC AUTOMATED: CPT

## 2022-06-02 PROCEDURE — 97535 SELF CARE MNGMENT TRAINING: CPT

## 2022-06-02 PROCEDURE — 83735 ASSAY OF MAGNESIUM: CPT

## 2022-06-02 PROCEDURE — 2580000003 HC RX 258: Performed by: INTERNAL MEDICINE

## 2022-06-02 PROCEDURE — 6360000002 HC RX W HCPCS

## 2022-06-02 PROCEDURE — 92507 TX SP LANG VOICE COMM INDIV: CPT

## 2022-06-02 PROCEDURE — 2580000003 HC RX 258

## 2022-06-02 RX ORDER — 0.9 % SODIUM CHLORIDE 0.9 %
500 INTRAVENOUS SOLUTION INTRAVENOUS ONCE
Status: COMPLETED | OUTPATIENT
Start: 2022-06-02 | End: 2022-06-02

## 2022-06-02 RX ORDER — LEVETIRACETAM 500 MG/1
500 TABLET ORAL 2 TIMES DAILY
Qty: 28 TABLET | Refills: 0 | Status: ON HOLD | OUTPATIENT
Start: 2022-06-02 | End: 2022-06-14 | Stop reason: HOSPADM

## 2022-06-02 RX ADMIN — SODIUM CHLORIDE 500 ML: 900 INJECTION, SOLUTION INTRAVENOUS at 15:05

## 2022-06-02 RX ADMIN — LEVETIRACETAM 500 MG: 100 INJECTION, SOLUTION, CONCENTRATE INTRAVENOUS at 20:18

## 2022-06-02 RX ADMIN — LEVETIRACETAM 500 MG: 100 INJECTION, SOLUTION, CONCENTRATE INTRAVENOUS at 09:41

## 2022-06-02 ASSESSMENT — PAIN SCALES - GENERAL
PAINLEVEL_OUTOF10: 0

## 2022-06-02 NOTE — PROGRESS NOTES
Physical Therapy  Facility/Department: [facility] ICU  Daily Treatment Note  NAME: Eduardo Cummings  : 1934  MRN: [id number]    Date of Service: 2022    Discharge Recommendations: Eduardo Cummings scored a 13/24 on the AM-PAC short mobility form. Current research shows that an AM-PAC score of 17 or less is typically not associated with a discharge to the patient's home setting. Based on the patient's AM-PAC score and their current functional mobility deficits, it is recommended that the patient have 3-5 sessions per week of Physical Therapy at d/c to increase the patient's independence. Please see assessment section for further patient specific details. PT Equipment Recommendations  Equipment Needed: No  Other: defer to next level of care    Patient Diagnosis(es): There were no encounter diagnoses. Assessment   Assessment: Pt was limited by symptomatic hypotension. Transfers and gait training were unsteady and unsafe; requiring intermittent assistance with balance during our initial stand. Pt required navigational and safety cues during gait training; a possible side effect of orthostatic hypotension. Pt was left in bed with RN at bedside. Activity Tolerance: Patient tolerated treatment well  Equipment Needed: No  Other: defer to next level of care     Plan    Plan  Plan weeks: 5-7     Restrictions  Position Activity Restriction  Other position/activity restrictions: PWB 25-50% RLE per RN discussion with ortho, up with assist     Subjective    Subjective  Subjective: Pt was sitting up feeling light headed with hypotension; RN requesting assistance to transfer pt back to bed.      Objective   Vitals  BP: (!) 85/55  MAP (Calculated): 65  Bed Mobility Training  Bed Mobility Training: Yes  Sit to Supine: Contact-guard assistance  Scooting: Stand-by assistance    Balance  Sitting: Intact  Standing: With support    Transfer Training  Transfer Training: Yes  Sit to Stand: Minimum assistance  Stand to Sit: Contact-guard assistance  Bed to Chair: Minimum assistance    Gait  Overall Level of Assistance: Min assist  Distance (ft): 5ft chair to bed  Assistive Device: Gait belt;Walker  Quality: unsteady shuffle; questionable WB compliance. Goals  Short Term Goals  Time Frame for Short term goals: discharge  Short term goal 1: Pt will transfer supine <--> sit with supervision with HOB flat  ongoing  Short term goal 2: Pt will transfer sit <--> stand with supervision  ongoing  Short term goal 3: Pt will amb 50 ft with walker and supervision  ongoing  Short term goal 4: Pt will verbalize good understanding of R LE WB status  ongoing  Patient Goals   Patient goals : eventually return home    Education  Patient Education  Education Given To: Patient  Education Provided: Role of Therapy;Precautions  Education Provided Comments: 25-50% WB R LE  Education Method: Verbal;Demonstration  Barriers to Learning: Cognition  Education Outcome: Continued education needed;Verbalized understanding    Therapy Time   Individual Concurrent Group Co-treatment   Time In 8116         Time Out 1317         Minutes 12               Reymundo De La Torre PTA    Addendum: 0 goals met. Continue per POC. This note will serve as a discharge summary if patient is discharged from hospital before next treatment session.    Kelly Bryson, PT

## 2022-06-02 NOTE — DISCHARGE INSTR - COC
Continuity of Care Form    Patient Name: Sindy Console   :  1934  MRN:  [id number]    Admit date:  2022  Discharge date:      Code Status Order: Limited   Advance Directives:      Admitting Physician:  Christina Graf MD  PCP: Bobby Sibley MD    Discharging Nurse:  [address] Unit/Room#: 0146/8522-54  Discharging Unit Phone Number:     Emergency Contact:   Extended Emergency Contact Information  Primary Emergency Contact: Kathy Lee [address] Phone: [phone]  Relation: Child  Secondary Emergency Contact: Huong Bhatt  Address: [address] [place] [address] Phone: [phone]  Relation: Child    Past Surgical History:  Past Surgical History:   Procedure Laterality Date    APPENDECTOMY      COLONOSCOPY      FOREARM SURGERY Left 8/10/2020    OPEN REDUCTION INTERNAL FIXATION LEFT DISTAL RADIUS with mini c-arm performed by Emiliano Menjivar MD at [address]  2010    left Ulna    KNEE SURGERY      left torn meniscus       Immunization History:   Immunization History   Administered Date(s) Administered    Td, unspecified formulation 2016    Zoster Live (Zostavax) 2007       Active Problems:  Patient Active Problem List   Diagnosis Code    Hyperlipidemia E78.5    GERD (gastroesophageal reflux disease) K21.9    Closed fracture of left distal radius S52.502A    Other secondary hypertension I15.8    Subarachnoid bleed (HCC) I60.9    Closed fracture of neck of right femur ([facility] S72.001A    Intracranial bleed ([facility] [address] [facility] I62.9       Isolation/Infection:   Isolation            No Isolation          Patient Infection Status       Infection Onset Added Last Indicated Last Indicated By Review Planned Expiration Resolved Resolved By    None active    Resolved    COVID-19 (Rule Out) 08/10/20 08/10/20 08/10/20 COVID-19 (Ordered)   08/10/20 Rule-Out Test Resulted            Nurse Assessment:  Last Vital Signs: BP (!) 85/55   Pulse 61   Temp 98.2 °F (36.8 °C) (Oral)   Resp 18   Ht 5' 8\" (1.727 m)   Wt 124 lb 1.9 oz (56.3 kg)   SpO2 99%   BMI 18.87 kg/m²     Last documented pain score (0-10 scale): Pain Level: 0  Last Weight:   Wt Readings from Last 1 Encounters:   22 124 lb 1.9 oz (56.3 kg)     Mental Status:  oriented and alert    IV Access:  { KATY IV ACCESS:320391624}    Nursing Mobility/ADLs:  Walking   {CHP DME BVDZ:352772363}  Transfer  {CHP DME HICS:535138332}  Bathing  {CHP DME QOJ}  Dressing  {CHP DME OMFS:942099488}  Toileting  {CHP DME JBUK:842084152}  Feeding  {CHP DME BFDH:544545303}  Med Admin  {CHP DME SQFZ:852356362}  Med Delivery   { KATY MED Delivery:025850706}    Wound Care Documentation and Therapy:  Incision 08/10/20 Wrist Anterior; Left (Active)   Number of days: 661        Elimination:  Continence: Bowel: {YES / YF:47028}  Bladder: {YES / CX:28159}  Urinary Catheter: {Urinary Catheter:469109685}   Colostomy/Ileostomy/Ileal Conduit: {YES / EV:11102}       Date of Last BM: ***    Intake/Output Summary (Last 24 hours) at 2022 1317  Last data filed at 2022 1302  Gross per 24 hour   Intake 753 ml   Output 450 ml   Net 303 ml     I/O last 3 completed shifts:   In: 343 [P.O.:240; IV Piggyback:103]  Out: 1750 [Urine:1750]    Safety Concerns:     [address] [employer] Safety Concerns:838692184}    Impairments/Disabilities:      [employer] Impairments/Disabilities:131724648}    Nutrition Therapy:  Current Nutrition Therapy:   [employer] Diet List:855140961}    Routes of Feeding: {CHP DME Other Feedings:036433446}  Liquids: {Slp liquid thickness:70105}  Daily Fluid Restriction: {CHP DME Yes amt example:196894331}  Last Modified Barium Swallow with Video (Video Swallowing Test): {Done Not Done LKIA:788148921}    Treatments at the Time of Hospital Discharge:   Respiratory Treatments: ***  Oxygen Therapy:  {Therapy; copd oxygen:08627}  Ventilator:    {MH CC Vent ACSW:272697113}    Rehab Therapies: {THERAPEUTIC INTERVENTION:7291472037}  Weight Bearing Status/Restrictions: [facility] CC Weight Bearin}  Other Medical Equipment (for information only, NOT a DME order):  {EQUIPMENT:131406450}  Other Treatments: ***    Patient's personal belongings (please select all that are sent with patient):  {CHP DME Belongings:183054238}    RN SIGNATURE:  {Esignature:862050381}    CASE MANAGEMENT/SOCIAL WORK SECTION    Inpatient Status Date: ***    Readmission Risk Assessment Score:  Readmission Risk              Risk of Unplanned Readmission:  8           Discharging to Facility/ Agency   Name:   Address:  Phone:  Fax:    Dialysis Facility (if applicable)   Name:  Address:  Dialysis Schedule:  Phone:  Fax:    / signature: {Esignature:434852923}    PHYSICIAN SECTION    Prognosis: Fair    Condition at Discharge: Stable    Rehab Potential (if transferring to Rehab): Good    Recommended Labs or Other Treatments After Discharge: follow up with your PCP in 1 week, follow up with Neurosurgeon in 1 week    Physician Certification: I certify the above information and transfer of Raymond Cooper  is necessary for the continuing treatment of the diagnosis listed and that she requires Nato Sarmad for greater 30 days.      Update Admission H&P: No change in H&P    PHYSICIAN SIGNATURE:  Electronically signed by Garry Fulton MD on 22 at 1:17 PM EDT

## 2022-06-02 NOTE — PROGRESS NOTES
Clinical Pharmacy Progress Note    All IVs in NS - Management by Pharmacy    Consult Date(s): 5/30/22  Consulting Provider(s): Dr. Debbi Palomo / Plan    R [facility] - All IVs in Normal Saline   Drips will be adjusted to normal saline as appropriate based on compatibility, in an effort to avoid fluid shifts, as D5W is osmotically active.  The following intermittent IV drips / infusions have been adjusted to saline:  o Levetiracetam (already in NS)   Note: Patient may have dextrose ordered as part of hypoglycemia treatment protocol.  Total IV fluid delivered to patient over last 24 hrs: 200 mL    As patient is not being treated for pituitary tumor resection or requiring hypertonic saline (defined as >0.9% NaCl), pharmacy will sign off of IV review consult, per protocol. Please call with any questions.     Hua Danielson PharmD, [facility]  Wireless: [phone]  6/2/2022 8:14 AM

## 2022-06-02 NOTE — CARE COORDINATION
Referral to [facility] ARU. Spoke with Levi in admissions to confirm the consult was received.      Electronically signed by LAUREN Roberts RN-[facility]  [phone]

## 2022-06-02 NOTE — PROGRESS NOTES
Occupational Therapy  Facility/Department: [facility]  Daily Treatment Note  NAME: Naomie Cortez  : 1934  MRN: [id number]    Date of Service: 2022    Discharge Recommendations: Naomie Cortez scored a 16/24 on the AM-PAC ADL Inpatient form. Current research shows that an AM-PAC score of 17 or less is typically not associated with a discharge to the patient's home setting. Based on the patient's AM-PAC score and their current ADL deficits, it is recommended that the patient have 3-5 sessions per week of Occupational Therapy at d/c to increase the patient's independence. Please see assessment section for further patient specific details. If patient discharges prior to next session this note will serve as a discharge summary. Please see below for the latest assessment towards goals. Patient Diagnosis(es): There were no encounter diagnoses. Past Medical History:  has a past medical history of Allergic rhinitis, GERD (gastroesophageal reflux disease), Hyperlipidemia, and Laceration of head. Past Surgical History:  has a past surgical history that includes knee surgery; Appendectomy; fracture surgery (2010); Colonoscopy (); and Forearm surgery (Left, 8/10/2020). Assessment         Assessment: Pt tolerated session well. Improving in functional mobility. Able to verb WB precautions, but needing cues throughout to maintain those precautions. Pt would benefit from cont OT to work on ADLs, funct mob and transfers. Cont with POC      Activity Tolerance: Patient tolerated treatment well      Plan   Plan  Times per Week: 5-7x  Times per Day: Daily     Restrictions       Subjective   Subjective  Subjective: Pt bed upon arrival. Pleasant and agreeble to OT.   Pain: no complaint of pain           Objective    Vitals     Bed Mobility Training  Bed Mobility Training: Yes  Supine to Sit: Stand-by assistance (HOB raised, bed rails used)  Scooting: Stand-by assistance      Balance  Sitting: Intact  Standing: With support (CGA, Min A during stance with no UE support to maintain PWB prect on RLE)      Transfer Training  Transfer Training: Yes  Sit to Stand: Contact-guard assistance  Stand to Sit: Contact-guard assistance  Toilet Transfer: Contact-guard assistance; Additional time (BSC over toilet, ambulating)      Gait  Overall Level of Assistance: Contact-guard assistance  Distance (ft): 30 Feet (to/from bathroom)  Assistive Device: Gait belt;Walker           ADL  Feeding: Setup; Increased time to complete  Grooming: Minimal assistance;Setup; Increased time to complete (~6 minutes, Min A for stance for oral care to follow PWB prect.)  Toileting: Minimal assistance (++time, Min A for stance for clothing mgm to maintain WB prect. Able to provide self mindi care seated on toilet)      Safety Devices  Type of Devices: Left in chair;Call light within reach; Chair alarm in place;Nurse notified         Patient Education  Education Given To: Patient  Education Provided: Role of Therapy;Plan of Care;ADL Adaptive Strategies;Transfer Training;Precautions  Education Method: Demonstration;Verbal  Education Outcome: Continued education needed    Goals  Short Term Goals  Time Frame for Short term goals: at d/c  Short Term Goal 1: Stance x 8 mins with Supervision for ADLs /IADLs- not met   Short Term Goal 2: LE Dressing with CGA and AEprn- not met  Short Term Goal 3: Commode transfers with Mod independence- not met  Short Term Goal 4: Oriented to environment 4/5 attempts- not met  Short Term Goal 5: Follow PWB precautions RLE with < 1 v.cues during transfers/ mobility- not met  Patient Goals   Patient goals : Unable to state       Therapy Time   Individual Concurrent Group Co-treatment   Time In 1100         Time Out 1154         Minutes 54         Timed Code Treatment Minutes: [facility], [facility]

## 2022-06-02 NOTE — CARE COORDINATION
Case Management Assessment           Daily Note                 Date/ Time of Note: 6/2/2022 11:02 AM         Note completed by: Chelo Bertrand RN    Patient Name: Karen Toro  YOB: 1934    Diagnosis:Subarachnoid bleed ([address] [I60.9]  Intracranial bleed ([address] [I62.9]  Patient Admission Status: Inpatient    Date of Admission:5/30/2022  4:33 PM Length of Stay: 3 GLOS: GMLOS: 2    Current Plan of Care: PT/OT GIULIANA Deras    Spoke with Ms. Joann Manazno daughter, Anne Marie Solis. Discussed options for post-acute care. She is most interested in Ms. Bhatt returning home with home care. If there is concern about her eturning to her own home, she can stay with Anne Marie Solis and her family in [address] [place] [address] And [facility]. Home Care has been arranged in the past. Therapy is recommending a walker.   ________________________________________________________________________________________  PT AM-PAC: 17 / 24 per last evaluation on: 6/1    OT AM-PAC: 16 / 24 per last evaluation on: 6/1      Chelo Bertrand RN  [facility]  Case Management Department  [phone]

## 2022-06-02 NOTE — DISCHARGE SUMMARY
Hospital Medicine Discharge Summary    Patient ID: David Mera      Patient's PCP: Adriel Jones MD    Admit Date: 5/30/2022     Discharge Date:     Admitting Physician: Zack Cazares MD     Discharge Physician: Too Pollack MD     Discharge Diagnoses: Active Hospital Problems    Diagnosis Date Noted    Intracranial bleed ([address] [I62.9] 06/02/2022     Priority: Medium    Closed fracture of neck of right femur ([address] [S72.001A]      Priority: Medium    Subarachnoid bleed ([address] [I60.9] 05/30/2022     Priority: Medium       The patient was seen and examined on day of discharge and this discharge summary is in conjunction with any daily progress note from day of discharge. Condition at discharge - stable    Hospital Course: patient seen and evaluated on the day of discharge. Patient informed about following up with appointments. Patient verbalized understanding for follow-up appointments. The patient and / or the family were informed of the results of tests, a time was given to answer questions, a plan was proposed and they agreed with plan. Medical reconciliation performed. Patient discharged stable condition. #Subarachnoid hemorrhage secondary to fall-Repeat CT head this AM (6/1) showed stable multifocal intracranial hemorrhage, subarachnoid, subdural and intraparenchymal.  #Closed fracture of the neck of right femoral  #Fall  #Baseline confusion  #Hypertension     Plans:  Keep SBP<180, PT/OT, follow-up with surgery in 2 weeks with repeat head CT. Neurosurgery signed off. Okay to have hip surgery from neuro service point. Continue Keppra 500 twice daily for now  Orthopedics recommended conservative treatment, no surgical intervention at this time.     Need SLP eval  PT/OT     DVT Prophylaxis: SCD for now, pending repeat CT head          Exam:     BP (!) 85/55   Pulse 61   Temp 98.2 °F (36.8 °C) (Oral)   Resp 18   Ht 5' 8\" (1.727 m)   Wt 124 lb 1.9 oz (56.3 kg)   SpO2 99% BMI 18.87 kg/m²   General appearance: No apparent distress, appears stated age and cooperative. HEENT: Pupils equal, round, and reactive to light. Conjunctivae/corneas clear. Neck: Supple, with full range of motion. No jugular venous distention. Trachea midline. Respiratory:  Normal respiratory effort. Clear to auscultation, bilaterally without Rales/Wheezes/Rhonchi. Cardiovascular: Regular rate and rhythm with normal S1/S2 without murmurs, rubs or gallops. Abdomen: Soft, non-tender, non-distended with normal bowel sounds. Musculoskeletal: No clubbing, cyanosis or edema bilaterally. Full range of motion without deformity. Skin: Skin color, texture, turgor normal.  No rashes or lesions. Neurologic:  Neurovascularly intact without any focal sensory/motor deficits. Cranial nerves: II-XII intact, grossly non-focal.  Psychiatric: Oriented x4, following commands appropriately  Capillary Refill: Brisk,3 seconds, normal   Peripheral Pulses: +2 palpable, equal bilaterally        Consults:     IP CONSULT TO NEUROSURGERY  IP CONSULT TO CRITICAL CARE  IP CONSULT TO ORTHOPEDIC SURGERY      Code Status:  Limited    Activity: activity as tolerated    Labs:  For convenience and continuity at follow-up the following most recent labs are provided:      CBC:    Lab Results   Component Value Date    WBC 6.8 06/02/2022    HGB 12.7 06/02/2022    HCT 38.3 06/02/2022     06/02/2022       Renal:    Lab Results   Component Value Date     06/02/2022    K 3.9 06/02/2022    K 4.4 05/30/2022    CL 99 06/02/2022    CO2 24 06/02/2022    BUN 18 06/02/2022    CREATININE 0.9 06/02/2022    CALCIUM 8.9 06/02/2022    PHOS 3.4 06/02/2022       Discharge Medications:     Current Discharge Medication List           Details   levETIRAcetam (KEPPRA) 500 MG tablet Take 1 tablet by mouth 2 times daily for 14 days  Qty: 28 tablet, Refills: 0              Details   Calcium-Phosphorus-Vitamin D (CALCIUM GUMMIES PO) Take by mouth Time Spent on discharge is more than 30 mints in the examination, evaluation, counseling and review of medications and discharge plan. Signed:    Verl Klinefelter, MD   6/2/2022      Thank you Ana Cristina José MD for the opportunity to be involved in this patient's care. If you have any questions or concerns please feel free to contact me at [phone].

## 2022-06-02 NOTE — PROGRESS NOTES
Speech Language Pathology  Facility/Department: [address] ICU  Dysphagia Daily Treatment Note/DC    NAME: Tuan Hong  : 1934  MRN: [id number]    Patient Diagnosis(es):   Patient Active Problem List    Diagnosis Date Noted    Intracranial bleed ([address] 2022    Closed fracture of neck of right femur ([address]     Subarachnoid bleed ([address] 2022    Other secondary hypertension 2020    Closed fracture of left distal radius     GERD (gastroesophageal reflux disease)     Hyperlipidemia      Allergies: Allergies   Allergen Reactions    Levofloxacin Nausea Only    Atorvastatin Other (See Comments)     Recent Chest Xray: (2022)      Impression   No significant findings in the chest.            CT Head: (2022)  Impression       Minimal progression in extra-axial hemorrhage in the right vertex as well as tiny amount of layering intraventricular hemorrhage.       Otherwise, intra-axial and extra-axial hemorrhage are without change.        Previous MBS - n/a  Chart reviewed. Medical Diagnosis: Subarachnoid bleed ([address] [I60.9]  Intracranial bleed ([address] [I62.9]   Treatment Diagnosis: dysphagia    BSE Impression 22  Dysphagia Impression : Grossly functional swallow, would benefit from ongoing monitoring. Unable to complete oral mech exam as pt with inconsistent direction following. With patient seated up in bed, assessed tolerance ice chips, thins via tsp/straw, puree, soft solid, and regular solid. Pt with positive oral acceptance, timely swallow movement, good oral clearance, no overt signs of aspiration or penetration. Recommend continue regular texture diet and thin liquids, as tolerated. Dysphagia Diagnosis: No clinical indicators of dysphagia    MBS results - not indicated    Pain: no c/o pain    Current Diet : ADULT DIET;  Regular   Recommended Form of Meds: PO     Treatment:  Pt seen bedside to address the following goals:  Goal 1: Patient will tolerate least restrictive diet without overt signs of aspiration or associated decline in respiratory status  6/2: pt lethargic however awakened to verbal stim. No respiratory decline per chart review. Pt accepted thin liquids via cup and straw as well as puree and solid textures. Pt demonstrated adequate mastication with solids, no pocketing /oral residue noted. No overt signs of aspiration, voice remained clear, swallow movement felt upon palpation of anterior neck. Goal met  Goal 2: Patient/caregiver will demonstrate understanding of swallowing concerns/recommendations. 6/2: Educated pt to purpose of visit, s/s of aspiration, concern if aspiration occurs, rationale for diet recommendation/strategies to reduce risk for aspiration and instruction to notify staff if any signs emerge. Pt stated partial comprehension  Goal met partially     Patient/Family/Caregiver Education:  As above    Compensatory Strategies:  Upright as possible for all oral intake    Plan:    Diet recommendations: cont regular diet/thin liquids  DC recommendation: follow up for speech/language tx  Treatment: 15  D/W nursing Rachel  Needs met prior to leaving room, call button in reach. Madhav Pizarro M.S./[facility]-SLP #[id number]  Pg.  # [phone]

## 2022-06-02 NOTE — PROGRESS NOTES
Speech Language Pathology  Facility/Department: [facility] ICU  Daily Treatment Note  NAME: Socorro Connelly  : 1934  MRN: [id number]    Patient Diagnosis(es): has Hyperlipidemia; GERD (gastroesophageal reflux disease); Closed fracture of left distal radius; Other secondary hypertension; Subarachnoid bleed ([facility] and Closed fracture of neck of right femur ([facility] on their problem list.  Onset Date: 2022    Primary Complaint:  None stated    Pain:  Pain Assessment  Pain Assessment: None - Denies Pain    Recent CT HEAD (22): Impression       Essentially stable multifocal intracranial hemorrhage, subarachnoid, subdural and intraparenchymal.       Chart reviewed. Per admitting H&P (2022): \"Sayda Bhatt is an 59-year-old female presents to the hospital secondary to a fall to the head. PMH includes HTN, history CVA? (lacunar stroke), HLD, baseline confusion. Patient was at [facility] earlier today and was found on the floor with a laceration to the forehead. Ultimately brought to [facility] for CT head was completed showing a right frontal subarachnoid hemorrhage extending into the sylvian fissure. Neurosurgery was consulted recommending Keppra and CTA head and neck and transferred to [facility]. On discussion with the patient has had multiple falls in the last year. Uses a cane but did not have cane when fall occurred. Denies recollection of the fall but no light headedness before, no loss of bowel or bladder after and denies any tongue biting. Video camera at location appears to be mechanical fall in nature per niece.  Otherwise well right now with no major complaints\"    Initial Assessment:   Aphasia (R47.01)  Cognitive Communication Deficit (R41.841)  Dysarthria (R47.1)    Treatment diagnosis[de-identified] Communication and cognitive impairment, including the areas of expressive/receptive aphasia (word finding, confrontation naming, divergent naming, yes/no question (60% accuracy), automatics, following directions), some seeming motor speech/dysarthric component, and impaired orientation, recall, attention. Of note, suspect general lethargy/AMS and possible hearing impairmenet may be impacting performance. Recommend ongoing assessment/therapy. Will continue to follow. Medical diagnosis: Subarachnoid bleed ([facility] [I60.9]    Treatment:  Pt seen to address the following goals:   Goal 1: Patient will answer yes/no questions with 80% accuracy. 1/:  Pt is able to respond to personal yes/no questions with 80% accuracy. Continue goal.  6/2: Pt responded to 2 unit yes/no questions with 80% accuracy  Goal met, cont with 90% accuracy    Goal 2: Patient will complete automatics with 100% accuracy. :  Goal not directly targeted during this session. Continue goal.  62: pt completed rote speech tasks with 100% accuracy  Goal met    Goal 3: Patient will complete graded naming tasks with 90% accuracy. :  Pt requires moderate cues for confrontation and responsive naming tasks. Pt demonstrates anomic and paraphasic errors. Continue goal.  2: confrontation baming 90%; pt had significant difficulty naming words in category provided, stating only 1-2 words; pt answered basic questions with 80% accuracy  Cont goal    Goal 4: Patient will follow basic directions wtih 90% accuracy. :  Goal not directly targeted during this session. Continue goal.  62:  Pt followed 2 step directions with 100% accuracy  Goal met    Goal 5: Patient will be oriented x3 with min cues. 6/1:  Pt is able to recall correct year with minimal assistance; she requires max cues to identify correct month&date. She incorrectly states her . Continue goal.  62: pt was not able to state orientation information, with mod cues provided  Cont goal    Goal 6: Patient will participate in ongoing assessment cognitive-linguistic skills as appropriate. 61:  Continue goal.  6/2: pt read sentence level material accurately.   Pt exhibiting micrographia when writing name and drawing face of clock. Pt provided 2 solutions to everyday problems accurately. Cont goal    Education:  SLP educated pt re: role of SLP and rationale for treatment tasks. Pt needs reinforcement. Plan:  Continue speech/language therapy to address above goals, 1-3x/week x LOS  DC recommendations:  pt will require ongoing ST at the next level of care  D/W nursing Wells Harvey  Needs met prior to leaving room, call button in reach. Treatment time: 15 minutes  Severo Dolin, M.S./[facility]-SLP #[id number]  Pg.  # [phone]  If patient is discharged prior to next treatment, this note will serve as the discharge summary

## 2022-06-02 NOTE — PROGRESS NOTES
Comprehensive Nutrition Assessment    RECOMMENDATIONS:  1. PO Diet: Continue regular diet per SLP recs  2. ONS: Start High Calorie / High Protein Ensure Enlive BID; Start Magic Cup QD    NUTRITION ASSESSMENT:   Nutritional summary & status: Nutrition screening triggered for low BMI indicative of underweight. Pt varied and at times minimal po intakes throughout adm thus far. Noting some meals consumed around 0-~50%. Noted 10 lb wt loss within past ~16 mths. Adding various ONS to dietary regimen to promote adequate po intakes and to promote wt gain. Noted pending precert for inpatient ARU. RD to monitor nutrition adequacy closely throughout adm.  Admission/PMH: Right subarachnoid hemorrhage; PMH: GERD, HLD    MALNUTRITION ASSESSMENT  Context of Malnutrition: Acute Illness   Malnutrition Status: Mild malnutrition  Findings of the 6 clinical characteristics of malnutrition (Minimum of 2 out of 6 clinical characteristics is required to make the diagnosis of moderate or severe Protein Calorie Malnutrition based on AND/ASPEN Guidelines):  Energy Intake: Less than/equal to 50% of estimated energy requirements    Energy Intake Time: 2 days    Weight Loss %: 8%   Weight loss Time: ~16 mths     NUTRITION DIAGNOSIS   Inadequate oral intake related to inadequate protein-energy intake as evidenced by intake 0-25%,intake 26-50%    NUTRITION INTERVENTION  Food and/or Nutrient Delivery:  Continue Current Diet,Start Oral Nutrition Supplement  Nutrition Education/Counseling:  No recommendation at this time   Goals:  Pt will tolerate >50% of all meals and ONS throughout adm. Nutrition Monitoring and Evaluation:   Food/Nutrient Intake Outcomes:  Food and Nutrient Intake,Supplement Intake  Physical Signs/Symptoms Outcomes:  Biochemical Data,GI Status,Weight     OBJECTIVE DATA: Significant to nutrition assessment  · Nutrition-Focused Physical Findings: elian last bm  · Labs: Reviewed;  Na 135  · Meds: Reviewed; NS bolus, prn zofran   · Wounds: None       CURRENT NUTRITION THERAPIES  ADULT DIET; Regular     PO Intake: 0%,51-75%   PO Supplement Intake:None Ordered  Additional Sources of Calories/IVF:n/a     ANTHROPOMETRICS  Current Height: 5' 8\" (172.7 cm)  Current Weight: 124 lb 1.9 oz (56.3 kg)    Admission weight: 132 lb 4.4 oz (60 kg)  Ideal Body Weight (IBW): 140 lbs  (64 kg)    Usual Bodyweight  (elian limited wt hx)   Weight Changes: -11 lbs within past 16 mths       BMI: 18.9    Wt Readings from Last 50 Encounters:   06/02/22 124 lb 1.9 oz (56.3 kg)   02/24/21 135 lb (61.2 kg)     COMPARATIVE STANDARDS  Energy (kcal):  8145-9353 (30-35)     Protein (g):  73-84 (1.3-1.5)       Fluid (ml/day):  1690-1970mL    The patient will still be monitored per nutrition standards of care. Consult dietitian if nutrition interventions essential to patient care is needed.      Umesh Mahmood [address] [place] [address]  Danis:  [phone]  Office:  [phone]

## 2022-06-02 NOTE — PROGRESS NOTES
Uneventful shift. Pt able to get up to go to the bathroom on numerous occasions. Pt uses the call light appropriately. Fall precautions in place. Pt denied pain throughout shift. VSS. Neuro assessment WNL. Patient verbalized understanding of discharge instructions, need for followup with PMD and/or specialist without fail.  Patient also provided with signs and symptoms to return to the emergency department immediately.  Patient A+Ox3, resps even and nonlabored, patient in no apparent distress.

## 2022-06-02 NOTE — PROGRESS NOTES
[facility]   After review, this patient is felt to be:       []  Appropriate for Acute Inpatient Rehab    [x]  Appropriate for Acute Inpatient Rehab Pending Insurance Authorization    []  Not appropriate for Acute Inpatient Rehab    []  Referral received and ARU reviewing patient      Precert initiated 5/2/4719 for ARU admission. Pt in agreement per  and CL's conversation with pt this AM. Ref#: [id number]. Will notify CM/SW with further updates.  Thank you for the referral.    Anson DENTON, OTR/L  Clinical Liaison- [facility]   (P): [phone] (F): [phone]

## 2022-06-02 NOTE — PLAN OF CARE
Problem: Discharge Planning  Goal: Discharge to home or other facility with appropriate resources  6/2/2022 0326 by Ilana Mirza RN  Outcome: Progressing  Flowsheets  Taken 6/2/2022 0000  Discharge to home or other facility with appropriate resources: Identify barriers to discharge with patient and caregiver  Taken 6/1/2022 2000  Discharge to home or other facility with appropriate resources:   Identify barriers to discharge with patient and caregiver   Arrange for needed discharge resources and transportation as appropriate   Identify discharge learning needs (meds, wound care, etc)     Problem: Pain  Goal: Verbalizes/displays adequate comfort level or baseline comfort level  6/2/2022 0326 by Ilana Mirza RN  Outcome: Progressing  Flowsheets (Taken 6/1/2022 2000)  Verbalizes/displays adequate comfort level or baseline comfort level:   Encourage patient to monitor pain and request assistance   Assess pain using appropriate pain scale   Implement non-pharmacological measures as appropriate and evaluate response     Problem: Skin/Tissue Integrity  Goal: Absence of new skin breakdown  Description: 1. Monitor for areas of redness and/or skin breakdown  2. Assess vascular access sites hourly  3. Every 4-6 hours minimum:  Change oxygen saturation probe site  4. Every 4-6 hours:  If on nasal continuous positive airway pressure, respiratory therapy assess nares and determine need for appliance change or resting period.   6/2/2022 0326 by Ilana Mirza RN  Outcome: Progressing     Problem: Safety - Adult  Goal: Free from fall injury  6/2/2022 0326 by Ilana Mirza RN  Outcome: Progressing     Problem: ABCDS Injury Assessment  Goal: Absence of physical injury  Outcome: Progressing  Flowsheets (Taken 6/2/2022 0000)  Absence of Physical Injury: Implement safety measures based on patient assessment

## 2022-06-03 LAB
ALBUMIN SERPL-MCNC: 3.9 G/DL (ref 3.4–5)
ANION GAP SERPL CALCULATED.3IONS-SCNC: 14 MMOL/L (ref 3–16)
BUN BLDV-MCNC: 21 MG/DL (ref 7–20)
CALCIUM SERPL-MCNC: 8.9 MG/DL (ref 8.3–10.6)
CHLORIDE BLD-SCNC: 100 MMOL/L (ref 99–110)
CO2: 22 MMOL/L (ref 21–32)
CREAT SERPL-MCNC: 0.9 MG/DL (ref 0.6–1.2)
GFR AFRICAN AMERICAN: >60
GFR NON-AFRICAN AMERICAN: 59
GLUCOSE BLD-MCNC: 85 MG/DL (ref 70–99)
HCT VFR BLD CALC: 37.8 % (ref 36–48)
HEMOGLOBIN: 12.7 G/DL (ref 12–16)
MAGNESIUM: 2.1 MG/DL (ref 1.8–2.4)
MCH RBC QN AUTO: 31.9 PG (ref 26–34)
MCHC RBC AUTO-ENTMCNC: 33.6 G/DL (ref 31–36)
MCV RBC AUTO: 95 FL (ref 80–100)
PDW BLD-RTO: 12.5 % (ref 12.4–15.4)
PHOSPHORUS: 3.5 MG/DL (ref 2.5–4.9)
PLATELET # BLD: 204 K/UL (ref 135–450)
PMV BLD AUTO: 9.6 FL (ref 5–10.5)
POTASSIUM SERPL-SCNC: 4 MMOL/L (ref 3.5–5.1)
RBC # BLD: 3.98 M/UL (ref 4–5.2)
SODIUM BLD-SCNC: 136 MMOL/L (ref 136–145)
WBC # BLD: 7.4 K/UL (ref 4–11)

## 2022-06-03 PROCEDURE — 99222 1ST HOSP IP/OBS MODERATE 55: CPT | Performed by: PHYSICAL MEDICINE & REHABILITATION

## 2022-06-03 PROCEDURE — 36415 COLL VENOUS BLD VENIPUNCTURE: CPT

## 2022-06-03 PROCEDURE — 2580000003 HC RX 258

## 2022-06-03 PROCEDURE — 85027 COMPLETE CBC AUTOMATED: CPT

## 2022-06-03 PROCEDURE — 92507 TX SP LANG VOICE COMM INDIV: CPT

## 2022-06-03 PROCEDURE — 6360000002 HC RX W HCPCS

## 2022-06-03 PROCEDURE — 6370000000 HC RX 637 (ALT 250 FOR IP): Performed by: INTERNAL MEDICINE

## 2022-06-03 PROCEDURE — 83735 ASSAY OF MAGNESIUM: CPT

## 2022-06-03 PROCEDURE — 1200000000 HC SEMI PRIVATE

## 2022-06-03 PROCEDURE — 80069 RENAL FUNCTION PANEL: CPT

## 2022-06-03 RX ORDER — LEVETIRACETAM 500 MG/1
500 TABLET ORAL 2 TIMES DAILY
Status: COMPLETED | OUTPATIENT
Start: 2022-06-03 | End: 2022-06-06

## 2022-06-03 RX ADMIN — LEVETIRACETAM 500 MG: 100 INJECTION, SOLUTION, CONCENTRATE INTRAVENOUS at 08:10

## 2022-06-03 RX ADMIN — LEVETIRACETAM 500 MG: 500 TABLET, FILM COATED ORAL at 20:46

## 2022-06-03 ASSESSMENT — PAIN SCALES - GENERAL
PAINLEVEL_OUTOF10: 0

## 2022-06-03 NOTE — DISCHARGE INSTR - DIET

## 2022-06-03 NOTE — PLAN OF CARE
Problem: Discharge Planning  Goal: Discharge to home or other facility with appropriate resources  Outcome: Progressing  Flowsheets (Taken 6/2/2022 2000)  Discharge to home or other facility with appropriate resources:   Identify barriers to discharge with patient and caregiver   Arrange for needed discharge resources and transportation as appropriate   Identify discharge learning needs (meds, wound care, etc)     Problem: Pain  Goal: Verbalizes/displays adequate comfort level or baseline comfort level  Outcome: Progressing     Problem: Skin/Tissue Integrity  Goal: Absence of new skin breakdown  Description: 1. Monitor for areas of redness and/or skin breakdown  2. Assess vascular access sites hourly  3. Every 4-6 hours minimum:  Change oxygen saturation probe site  4. Every 4-6 hours:  If on nasal continuous positive airway pressure, respiratory therapy assess nares and determine need for appliance change or resting period.   Outcome: Progressing     Problem: Safety - Adult  Goal: Free from fall injury  Outcome: Progressing     Problem: ABCDS Injury Assessment  Goal: Absence of physical injury  Outcome: Progressing     Problem: Nutrition Deficit:  Goal: Optimize nutritional status  6/3/2022 0147 by Shira Haney RN  Outcome: Progressing

## 2022-06-03 NOTE — PROGRESS NOTES
Notification of IV to PO Conversion     IV To Po Conversion:   Will convert levetiracetam 500 mg IV q12h to levetiracetam 500 mg PO twice daily based on [address] IV to PO policy (see below). Please call with questions.   Anny Mandel PharmD, [facility] HospitalCP  Wireless: [phone]  6/3/2022 10:34 AM    Criteria for conversion from IV to PO therapy per [address] IV to PO Protocol:   Patients should meet all of the following inclusion criteria and none of the exclusion criteria    Criteria to initiate medication route switch (Inclusion Criteria)  IV therapy for > 24 hours (antibiotics only)  Tolerating diet more advanced than clear liquids  Tolerating oral (PO) medications  Does not require vasopressor therapy for blood pressure support  No seizures for 72hrs (antiepileptic medications only)      Criteria indicating that the patient is NOT a candidate for IV to PO conversion (Exclusion Criteria)  Infections requiring IV therapy (ie: meningitis, endocarditis, osteomyelitis, pancreatitis)  Nausea and/or vomiting or severe diarrhea within past 24 hours  Has gastrectomy, ileus, gastric outlet or bowel obstruction, or malabsorption syndromes  Has significant, painful oral ulceration  TPN with an NPO order  Active GI bleed  Unable to swallow  Nothing by Mouth (NPO) status  Febrile in the last 24 hours- (antibiotics only)  Clinical deteriorating or unstable - (antibiotics only)  Pediatric patients and patients who are not euthyroid (not on oral levothyroxine/not stabilized on oral levothyroxine) - (Levothyroxine only)  Patients being treated for myxedema coma or during the organ donation process - (Levothyroxine only)    Other notes-   Patients may be given suppositories when available for the product being ordered if no contraindications exist (ie: rectal surgery or infection)   Oral solutions/suspensions may be considered for patients with a functioning enteral tube not on continuous suction (if medication is available in this formulation)

## 2022-06-03 NOTE — PROGRESS NOTES
Appetite better/ pt intake 80% breakfast/ PT/OT/ST involved/ pt remains hemodynamically stable/ pending ARU pre cert per Case management for DC planning/ see orders/ notes/ flowsheet

## 2022-06-03 NOTE — PROGRESS NOTES
Speech Language Pathology  Facility/Department: [facility] ICU  Daily Treatment Note  NAME: Tati Love  : 1934  MRN: [id number]    Patient Diagnosis(es): has Hyperlipidemia; GERD (gastroesophageal reflux disease); Closed fracture of left distal radius; Other secondary hypertension; Subarachnoid bleed ([address] Closed fracture of neck of right femur ([address] and Intracranial bleed ([facility] [address] on their problem list.  Onset Date: 2022    Primary Complaint:  None stated    Pain:  Pain Assessment  Pain Assessment: None - Denies Pain    Recent CT HEAD (22): Impression       Essentially stable multifocal intracranial hemorrhage, subarachnoid, subdural and intraparenchymal.       Chart reviewed. Per admitting H&P (2022): \"Sayda Bhatt is an 66-year-old female presents to the hospital secondary to a fall to the head. PMH includes HTN, history CVA? (lacunar stroke), HLD, baseline confusion. Patient was at [facility] earlier today and was found on the floor with a laceration to the forehead. [facility] was completed showing a right frontal subarachnoid hemorrhage extending into the sylvian fissure. Neurosurgery was consulted recommending Keppra and CTA head and neck and [facility] to [facility]. On discussion with the patient has had multiple falls in the last year. Uses a cane but did not have cane when fall occurred. Denies recollection of the fall but no light headedness before, no loss of bowel or bladder after and denies any tongue biting. Video camera at location appears to be mechanical fall in nature per niece.  Otherwise well right now with no major complaints\"    Treatment diagnosis[de-identified] Communication and cognitive impairment, including the areas of expressive/receptive aphasia (word finding, confrontation naming, divergent naming, yes/no question (60% accuracy), automatics, following directions), some seeming motor speech/dysarthric component, and impaired orientation, recall, attention. Of note, suspect general lethargy/AMS and possible hearing impairmenet may be impacting performance. Recommend ongoing assessment/therapy. Will continue to follow. Medical diagnosis: Subarachnoid bleed ([address] [I60.9]  Intracranial bleed ([address] [I62.9]    Treatment:  Pt seen to address the following goals:   Goal 1: Patient will answer yes/no questions with 80% accuracy. 6/1/:  Pt is able to respond to personal yes/no questions with 80% accuracy. Continue goal.  6/2: Pt responded to 2 unit yes/no questions with 80% accuracy  Goal met, cont with 90% accuracy  6/3; pt answered 2 unit yes/no questions with 80% accuracy  Cont goal    Goal 2: Patient will complete automatics with 100% accuracy. 6/1:  Goal not directly targeted during this session. Continue goal.  6/2: pt completed rote speech tasks with 100% accuracy  Goal met    Goal 3: Patient will complete graded naming tasks with 90% accuracy. 6/1:  Pt requires moderate cues for confrontation and responsive naming tasks. Pt demonstrates anomic and paraphasic errors. Continue goal.  6/2: confrontation baming 90%; pt had significant difficulty naming words in category provided, stating only 1-2 words; pt answered basic questions with 80% accuracy  Cont goal  6/3: pt cont to exhibit anomia and occasional paraphasias and decreased initiation, though improving. confrontation naming 90%; responsive naming 80%. Pt exhibited significant difficulty naming words in category presented, with max cues provided. Pt named objects described with 30% accuracy, with initial phonemic cues, accuracy improved to 70%. Cont goal    Goal 4: Patient will follow basic directions wtih 90% accuracy. 6/1:  Goal not directly targeted during this session. Continue goal.  6/2:  Pt followed 2 step directions with 100% accuracy  Goal met    Goal 5: Patient will be oriented x3 with min cues.   6/1:  Pt is able to recall correct year with minimal assistance; she requires max cues to identify correct month&date. She incorrectly states her . Continue goal.  2: pt was not able to state orientation information, with mod cues provided  Cont goal  63: pt required mod cues to state correct orientation information  Cont goal    Goal 6: Patient will participate in ongoing assessment cognitive-linguistic skills as appropriate. 61:  Continue goal.  2: pt read sentence level material accurately. Pt exhibiting micrographia when writing name and drawing face of clock. Pt provided 2 solutions to everyday problems accurately. Cont goal  6/3: pt solved 3 digit addition and subtraction problems accurately and required min cues for money concept tasks. Cont goal    Education:  SLP educated pt to role of SLP and rationale for treatment tasks. Pt needs reinforcement. Plan:  Continue speech/language therapy to address above goals, 1-3x/week x LOS  DC recommendations:  pt will require ongoing ST at the next level of care  D/W nursing   Needs met prior to leaving room, call button in reach. Treatment time: 20 minutes  Garett Foster M.S./[facility]-SLP #[id number]  Pg.  # [phone]  If patient is discharged prior to next treatment, this note will serve as the discharge summary

## 2022-06-03 NOTE — PROGRESS NOTES
Uneventful shift. Pt slept through most of the night with not much appetite. Pt encouraged to eat and drink. Pt had one episode of incontinence. VSS.

## 2022-06-03 NOTE — CONSULTS
Consult Note  Physical Medicine and Rehabilitation    Patient: Ileana Merida  [id number]  Date: 6/3/2022      Chief Complaint: fall    History of Present Illness/Hospital Course:  Ileana Merida is an 80-year-old female with HTN, history prior lacunar stroke, HLD.presents to the hospital after a fall at a [employer]rymat. She was found down on the floor. Initially presented to [facility], where her CT head showed SAH. She was then transferred to [facility] for neurosurgery consultation. At baseline patient is slightly confused, ambulates with a peña. She also endorses multiple falls. Repeat CT head showed stable bleed. Has been able to participate with PT/OT. Able to tolerate regular diet. No acute complaints today. Prior Level of Function:  Independent for mobility, ADLs, and IADLs    Current Level of Function:  Mod assist    Pertinent Social History:  Support: Family, step daughter lives close by, niece  Home set-up: Live at a house by herself, does not go upstairs, has bath tub.     Past Medical History:   Diagnosis Date    Allergic rhinitis     GERD (gastroesophageal reflux disease)     Hyperlipidemia     Laceration of head 06/23/2016       Past Surgical History:   Procedure Laterality Date    APPENDECTOMY      COLONOSCOPY  4-2011    FOREARM SURGERY Left 8/10/2020    OPEN REDUCTION INTERNAL FIXATION LEFT DISTAL RADIUS with mini c-arm performed by Howard Masterson MD at [place] [address]  8/2010    left Ulna    KNEE SURGERY      left torn meniscus       Family History   Problem Relation Age of Onset    Arthritis Mother     Heart Attack Father        Social History     Socioeconomic History    Marital status:      Spouse name: None    Number of children: None    Years of education: None    Highest education level: None   Occupational History    None   Tobacco Use    Smoking status: Never Smoker    Smokeless tobacco: Former User   Vaping Use    Vaping Use: Never used   Substance and Sexual Activity    Alcohol use: No    Drug use: No    Sexual activity: None   Other Topics Concern    None   Social History Narrative    None     Social Determinants of Health     Financial Resource Strain:     Difficulty of Paying Living Expenses: Not on file   Food Insecurity:     Worried About Running Out of Food in the Last Year: Not on file    Lui of Food in the Last Year: Not on file   Transportation Needs:     Lack of Transportation (Medical): Not on file    Lack of Transportation (Non-Medical):  Not on file   Physical Activity:     Days of Exercise per Week: Not on file    Minutes of Exercise per Session: Not on file   Stress:     Feeling of Stress : Not on file   Social Connections:     Frequency of Communication with Friends and Family: Not on file    Frequency of Social Gatherings with Friends and Family: Not on file    Attends [employer] Services: Not on file    Active Member of [address] [place] [address] or Organizations: Not on file    Attends Club or Organization Meetings: Not on file    Marital Status: Not on file   Intimate Partner Violence:     Fear of Current or Ex-Partner: Not on file    Emotionally Abused: Not on file    Physically Abused: Not on file    Sexually Abused: Not on file   Housing Stability:     Unable to Pay for Housing in the Last Year: Not on file    Number of [place] in the Last Year: Not on file    Unstable Housing in the Last Year: Not on file           REVIEW OF SYSTEMS:   CONSTITUTIONAL: negative for fevers, chills, diaphoresis, appetite change, night sweats, unexpected weight change, fatigue  EYES: negative for blurred vision, eye discharge, visual disturbance and icterus  HEENT: negative for hearing loss, tinnitus, ear drainage, sinus pressure, nasal congestion, epistaxis and snoring  RESPIRATORY: Negative for hemoptysis, cough, sputum production  CARDIOVASCULAR: negative for chest pain, palpitations, exertional chest pressure/discomfort, syncope, edema  GASTROINTESTINAL: negative for nausea, vomiting, diarrhea, blood in stool, abdominal pain, constipation  GENITOURINARY: negative for frequency, dysuria, urinary incontinence, decreased urine volume, and hematuria  HEMATOLOGIC/LYMPHATIC: negative for easy bruising, bleeding and lymphadenopathy  ALLERGIC/IMMUNOLOGIC: negative for recurrent infections, angioedema, anaphylaxis and drug reactions  ENDOCRINE: negative for weight changes and diabetic symptoms including polyuria, polydipsia and polyphagia  MUSCULOSKELETAL: negative for pain, joint swelling, decreased range of motion  NEUROLOGICAL: negative for headaches, slurred speech, unilateral weakness  PSYCHIATRIC/BEHAVIORAL: negative for hallucinations, behavioral problems, confusion and agitation. Physical Examination:  Vitals:   Patient Vitals for the past 24 hrs:   BP Temp Temp src Pulse Resp SpO2   06/03/22 0600 139/62 -- -- 58 18 --   06/03/22 0400 132/67 98.3 °F (36.8 °C) Oral 60 19 96 %   06/03/22 0200 (!) 148/61 -- -- 61 18 97 %   06/03/22 0000 125/61 -- -- 71 15 98 %   06/02/22 2200 128/60 -- -- 59 19 --   06/02/22 2000 (!) 157/89 98.2 °F (36.8 °C) Oral 64 14 100 %   06/02/22 1854 132/78 98.1 °F (36.7 °C) Oral 60 18 100 %   06/02/22 1327 128/73 -- -- 56 20 --   06/02/22 1318 (!) 85/55 -- -- -- -- --   06/02/22 1302 (!) 85/55 -- -- 61 -- --   06/02/22 1240 (!) 84/58 98.2 °F (36.8 °C) Oral 62 18 99 %   06/02/22 0915 (!) 174/64 98.4 °F (36.9 °C) Oral 59 18 98 %     Const: Alert and oriented. WDWN. No distress. Eyes: Conjunctiva noninjected, no icterus noted; pupils equal, round, and reactive to light. HENT: normocephalic; Oral mucosa moist. Abrasion on right forehead. Neck: Trachea midline, neck supple. No thyromegaly noted. CV: Regular rate and rhythm, no murmur rub or gallop noted  Resp: Lungs clear to auscultation bilaterally, no rales wheezes or ronchi, no retractions. Respirations unlabored. GI: Soft, nontender, nondistended.  Normal bowel sounds. No palpable masses. Skin: Normal temperature and turgor. No rashes or breakdown noted. Ext: No significant edema appreciated. No varicosities. MSK: No joint tenderness, erythema, warmth noted. AROM intact. Large ecchymosis on right shoulder, large ecchymosis on right hip, tender to touch, appears healing. Neuro: Alert, oriented, appropriate. No cranial nerve deficits appreciated. Sensation intact to light touch. Motor examination reveals normal strength in all four limbs diffusely. No abnormalities with finger/nose or heel/shin noted. Reflexes normal and symmetric. Psych: Stable mood, normal judgement, normal affect     Lab Results   Component Value Date    WBC 7.4 06/03/2022    HGB 12.7 06/03/2022    HCT 37.8 06/03/2022    MCV 95.0 06/03/2022     06/03/2022     Lab Results   Component Value Date    INR 1.03 05/30/2022    PROTIME 13.4 05/30/2022     Lab Results   Component Value Date    CREATININE 0.9 06/02/2022    BUN 18 06/02/2022     (L) 06/02/2022    K 3.9 06/02/2022    CL 99 06/02/2022    CO2 24 06/02/2022     Lab Results   Component Value Date    ALT 11 05/30/2022    AST 19 05/30/2022    ALKPHOS 43 05/30/2022    BILITOT 0.3 05/30/2022       Most recent echocardiogram revealed:  5/30/2022  Summary   Left ventricular cavity size is normal. Normal left ventricular wall   thickness. Overall left ventricular systolic function appears normal with an   ejection fraction of 55-60 %. No regional wall motion abnormalities are   noted. Normal diastolic function. Estimated pulmonary artery systolic pressure is at 29 mmHg assuming a right   atrial pressure of 15 mmHg. IVC size is dilated (>2.1 cm) and collapses < 50% with respiration   consistent with elevated RA pressure (15 mmHg).    A bubble study was performed and fails to show evidence of right to left   shunting    Most recent EKG revealed:  Sinus rhythm, no acute changes    Most recent imaging studies revealed:  CT head without contrast 6/1/2022  Impression       Essentially stable multifocal intracranial hemorrhage, subarachnoid, subdural and intraparenchymal.       On my review, CXR displays no acute findings     CT HEAD WO CONTRAST   Final Result      Essentially stable multifocal intracranial hemorrhage, subarachnoid, subdural and intraparenchymal.      MRI HIP RIGHT WO CONTRAST   Final Result      1. Nondisplaced fracture in the right greater trochanter. 2.  Edema/contusion in the subcutaneous tissues over the right hip. CT HIP RIGHT WO CONTRAST   Final Result   1. Nondisplaced fracture the cortex of the greater trochanter and irregularity of the femoral neck with underlying osteoporosis. Occult fracture and contusion are considered. Further evaluation with magnetic resonance imaging is recommended to assess the    degree of marrow edema. 2. Underlying osteoarthritis of the femoral acetabular joint. CT HEAD WO CONTRAST   Final Result      Minimal progression in extra-axial hemorrhage in the right vertex as well as tiny amount of layering intraventricular hemorrhage. Otherwise, intra-axial and extra-axial hemorrhage are without change. XR SHOULDER RIGHT (MIN 2 VIEWS)   Final Result     No acute findings in the right shoulder. CT HEAD WO CONTRAST   Final Result   1. Questionable minimal improvement in the subarachnoid    hemorrhage, as detailed above. 2.  The intraparenchymal focus involving the right high    frontoparietal region is stable in appearance and morphology. No    new interval intracranial hemorrhage identified. 3.  No significant new mass effect or midline shift. XR HIP 2-3 VW W PELVIS RIGHT   Final Result      Lucency in the right femoral head neck junction could be artifactual but a subcapital fracture is difficult to exclude. CT recommended. XR ELBOW RIGHT (MIN 3 VIEWS)   Final Result      No evidence of fracture.       CT head without contrast   Final Result      1. Increase in area of the right frontal and temporal subarachnoid hemorrhage. 2.  Slightly increased size of the right frontal convexity acute subdural hematoma. 3.  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 4.  Stable high right frontal lobe hemorrhagic cortical contusions. 5.  No new mass effect or shift. Assessment:  1. Acute debilitation secondary to right hip fracture  - Right femoral neck fracture with conservative treatment  - PT/OT    2. Subarachnoid hemorrhage secondary to fall   - CT head showed   - Repeat CT head showed stable bleed  - Neurosurgery signed off as no intervention indicated  - Will need repeat head CT in 2 weeks   - Keppra 500 mg BID   - SBP goal < 180  - PT/OT     3. Close fracture of the neck of the right femoral  - s/p fall   - Orthopedics recommended conservative treatment   - PT/OT       Impairments-  Decreased functional mobility, Decreased ADLs    Recommendations:  Acute rehab when medically stable. Precert started on 6/2. Patient with new functional deficits and ongoing medical complexity. Demonstrates ability to tolerate 3 hours therapy/day. Patient is a good candidate for acute inpatient rehab when medically appropriate. Thank you for this consult. Please contact me with any questions or concerns. Shanice Altamirano MD, PGY-3  Internal Medicine  Please contact via [employer]  6/3/2022   7:56 AM    The patient will be staffed and discussed with attending physician, Dr. Kayla Awad. This patient has been seen, examined, and discussed with the resident. This note has been altered to reflect my own examination findings, impression, and recommendations.        Juan David Richey D.O. M.P.H  PM&R  6/3/2022  7:23 AM

## 2022-06-03 NOTE — DISCHARGE SUMMARY
Hospital Medicine Discharge Summary    Patient ID: Logan Rubio      Patient's PCP: Georgia Johnson MD    Admit Date: 5/30/2022     Discharge Date:     Admitting Physician: Kandy Almonte MD     Discharge Physician: Nannette Mendoza MD     Discharge Diagnoses: Active Hospital Problems    Diagnosis Date Noted    Intracranial bleed ([address] [I62.9] 06/02/2022     Priority: Medium    Closed fracture of neck of right femur ([address] [S72.001A]      Priority: Medium    Subarachnoid bleed ([address] [I60.9] 05/30/2022     Priority: Medium       The patient was seen and examined on day of discharge and this discharge summary is in conjunction with any daily progress note from day of discharge. Condition at discharge - stable    Hospital Course: patient seen and evaluated on the day of discharge. Patient informed about following up with appointments. Patient verbalized understanding for follow-up appointments. The patient and / or the family were informed of the results of tests, a time was given to answer questions, a plan was proposed and they agreed with plan. Medical reconciliation performed. Patient discharged stable condition. #Subarachnoid hemorrhage secondary to fall-Repeat CT head this AM (6/1) showed stable multifocal intracranial hemorrhage, subarachnoid, subdural and intraparenchymal.  #Closed fracture of the neck of right femoral  #Fall  #Baseline confusion  #Hypertension     Plans:  Keep SBP<180, PT/OT, follow-up with surgery in 2 weeks with repeat head CT. Neurosurgery signed off. Okay to have hip surgery from neuro service point. Continue Keppra 500 twice daily for now  Orthopedics recommended conservative treatment, no surgical intervention at this time.     Need SLP eval  PT/OT     DVT Prophylaxis: SCD for now, pending repeat CT head      Subjective - stable for discharge, no complaints today, dc to ARU, pending precert      Exam:     BP (!) 140/69   Pulse 70   Temp 98.3 °F (36.8 °C) (Oral)   Resp 20   Ht 5' 8\" (1.727 m)   Wt 124 lb 1.9 oz (56.3 kg)   SpO2 98%   BMI 18.87 kg/m²   General appearance: No apparent distress, appears stated age and cooperative. HEENT: Pupils equal, round, and reactive to light. Conjunctivae/corneas clear. Neck: Supple, with full range of motion. No jugular venous distention. Trachea midline. Respiratory:  Normal respiratory effort. Clear to auscultation, bilaterally without Rales/Wheezes/Rhonchi. Cardiovascular: Regular rate and rhythm with normal S1/S2 without murmurs, rubs or gallops. Abdomen: Soft, non-tender, non-distended with normal bowel sounds. Musculoskeletal: No clubbing, cyanosis or edema bilaterally. Full range of motion without deformity. Skin: Skin color, texture, turgor normal.  No rashes or lesions. Neurologic:  Neurovascularly intact without any focal sensory/motor deficits. Cranial nerves: II-XII intact, grossly non-focal.  Psychiatric: Oriented x4, following commands appropriately  Capillary Refill: Brisk,3 seconds, normal   Peripheral Pulses: +2 palpable, equal bilaterally        Consults:     IP CONSULT TO NEUROSURGERY  IP CONSULT TO CRITICAL CARE  IP CONSULT TO ORTHOPEDIC SURGERY  IP CONSULT TO PHYSICAL MEDICINE REHAB      Code Status:  Limited    Activity: activity as tolerated    Labs:  For convenience and continuity at follow-up the following most recent labs are provided:      CBC:    Lab Results   Component Value Date    WBC 7.4 06/03/2022    HGB 12.7 06/03/2022    HCT 37.8 06/03/2022     06/03/2022       Renal:    Lab Results   Component Value Date     06/03/2022    K 4.0 06/03/2022    K 4.4 05/30/2022     06/03/2022    CO2 22 06/03/2022    BUN 21 06/03/2022    CREATININE 0.9 06/03/2022    CALCIUM 8.9 06/03/2022    PHOS 3.5 06/03/2022       Discharge Medications:     Current Discharge Medication List           Details   levETIRAcetam (KEPPRA) 500 MG tablet Take 1 tablet by mouth 2 times daily for 14 days  Qty: 28 tablet, Refills: 0              Details   Calcium-Phosphorus-Vitamin D (CALCIUM GUMMIES PO) Take by mouth             Time Spent on discharge is more than 30 mints in the examination, evaluation, counseling and review of medications and discharge plan. Signed:    Britt Montilla MD   6/3/2022      Thank you Royal Neptali MD for the opportunity to be involved in this patient's care. If you have any questions or concerns please feel free to contact me at [phone].

## 2022-06-04 LAB
ALBUMIN SERPL-MCNC: 3.9 G/DL (ref 3.4–5)
ANION GAP SERPL CALCULATED.3IONS-SCNC: 13 MMOL/L (ref 3–16)
BUN BLDV-MCNC: 26 MG/DL (ref 7–20)
CALCIUM SERPL-MCNC: 8.8 MG/DL (ref 8.3–10.6)
CHLORIDE BLD-SCNC: 103 MMOL/L (ref 99–110)
CO2: 22 MMOL/L (ref 21–32)
CREAT SERPL-MCNC: 1 MG/DL (ref 0.6–1.2)
GFR AFRICAN AMERICAN: >60
GFR NON-AFRICAN AMERICAN: 52
GLUCOSE BLD-MCNC: 108 MG/DL (ref 70–99)
HCT VFR BLD CALC: 36 % (ref 36–48)
HEMOGLOBIN: 12.2 G/DL (ref 12–16)
MAGNESIUM: 2.2 MG/DL (ref 1.8–2.4)
MCH RBC QN AUTO: 32.1 PG (ref 26–34)
MCHC RBC AUTO-ENTMCNC: 33.9 G/DL (ref 31–36)
MCV RBC AUTO: 94.7 FL (ref 80–100)
PDW BLD-RTO: 12.7 % (ref 12.4–15.4)
PHOSPHORUS: 2.9 MG/DL (ref 2.5–4.9)
PLATELET # BLD: 220 K/UL (ref 135–450)
PMV BLD AUTO: 9.5 FL (ref 5–10.5)
POTASSIUM SERPL-SCNC: 3.8 MMOL/L (ref 3.5–5.1)
RBC # BLD: 3.8 M/UL (ref 4–5.2)
SODIUM BLD-SCNC: 138 MMOL/L (ref 136–145)
WBC # BLD: 6.3 K/UL (ref 4–11)

## 2022-06-04 PROCEDURE — 1200000000 HC SEMI PRIVATE

## 2022-06-04 PROCEDURE — 80069 RENAL FUNCTION PANEL: CPT

## 2022-06-04 PROCEDURE — 36415 COLL VENOUS BLD VENIPUNCTURE: CPT

## 2022-06-04 PROCEDURE — 6370000000 HC RX 637 (ALT 250 FOR IP): Performed by: INTERNAL MEDICINE

## 2022-06-04 PROCEDURE — 85027 COMPLETE CBC AUTOMATED: CPT

## 2022-06-04 PROCEDURE — 83735 ASSAY OF MAGNESIUM: CPT

## 2022-06-04 PROCEDURE — 94761 N-INVAS EAR/PLS OXIMETRY MLT: CPT

## 2022-06-04 RX ADMIN — LEVETIRACETAM 500 MG: 500 TABLET, FILM COATED ORAL at 20:27

## 2022-06-04 RX ADMIN — LEVETIRACETAM 500 MG: 500 TABLET, FILM COATED ORAL at 09:43

## 2022-06-04 ASSESSMENT — PAIN SCALES - GENERAL
PAINLEVEL_OUTOF10: 0

## 2022-06-04 NOTE — CARE COORDINATION
Social Work: Discussed in Interdisciplinary Rounds:    SW following for acute rehab placement. Precert pending for [facility] ARU. Will notify SW once precert comes in.     Martina Felton, HUNTER  [phone]

## 2022-06-04 NOTE — PROGRESS NOTES
No acute events to occur throughout shift. Pts appetite increased. No episodes of incontinence. Sleep adequate. VSS. Will continue to monitor.

## 2022-06-04 NOTE — PLAN OF CARE
Problem: Pain  Goal: Verbalizes/displays adequate comfort level or baseline comfort level  Outcome: Progressing     Problem: Skin/Tissue Integrity  Goal: Absence of new skin breakdown  Description: 1. Monitor for areas of redness and/or skin breakdown  2. Assess vascular access sites hourly  3. Every 4-6 hours minimum:  Change oxygen saturation probe site  4. Every 4-6 hours:  If on nasal continuous positive airway pressure, respiratory therapy assess nares and determine need for appliance change or resting period.   Outcome: Progressing     Problem: Safety - Adult  Goal: Free from fall injury  Outcome: Progressing     Problem: ABCDS Injury Assessment  Goal: Absence of physical injury  Outcome: Progressing     Problem: Nutrition Deficit:  Goal: Optimize nutritional status  Outcome: Progressing

## 2022-06-04 NOTE — PLAN OF CARE
Problem: Discharge Planning  Goal: Discharge to home or other facility with appropriate resources  Outcome: Progressing  Flowsheets (Taken 6/3/2022 2000)  Discharge to home or other facility with appropriate resources:   Identify barriers to discharge with patient and caregiver   Arrange for needed discharge resources and transportation as appropriate   Identify discharge learning needs (meds, wound care, etc)     Problem: Pain  Goal: Verbalizes/displays adequate comfort level or baseline comfort level  Outcome: Progressing     Problem: Skin/Tissue Integrity  Goal: Absence of new skin breakdown  Description: 1. Monitor for areas of redness and/or skin breakdown  2. Assess vascular access sites hourly  3. Every 4-6 hours minimum:  Change oxygen saturation probe site  4. Every 4-6 hours:  If on nasal continuous positive airway pressure, respiratory therapy assess nares and determine need for appliance change or resting period.   Outcome: Progressing     Problem: Safety - Adult  Goal: Free from fall injury  Outcome: Progressing     Problem: ABCDS Injury Assessment  Goal: Absence of physical injury  Outcome: Progressing     Problem: Nutrition Deficit:  Goal: Optimize nutritional status  Outcome: Progressing

## 2022-06-04 NOTE — DISCHARGE SUMMARY
Hospital Medicine Discharge Summary    Patient ID: Melodie Diamond      Patient's PCP: Burgess Vinh MD    Admit Date: 5/30/2022     Discharge Date:     Admitting Physician: Judy Cutler MD     Discharge Physician: Mateus Wells MD     Discharge Diagnoses: Active Hospital Problems    Diagnosis Date Noted    Intracranial bleed ([address] [I62.9] 06/02/2022     Priority: Medium    Closed fracture of neck of right femur ([address] [S72.001A]      Priority: Medium    Subarachnoid bleed ([address] [I60.9] 05/30/2022     Priority: Medium       The patient was seen and examined on day of discharge and this discharge summary is in conjunction with any daily progress note from day of discharge. Condition at discharge - stable    Hospital Course: patient seen and evaluated on the day of discharge. Patient informed about following up with appointments. Patient verbalized understanding for follow-up appointments. The patient and / or the family were informed of the results of tests, a time was given to answer questions, a plan was proposed and they agreed with plan. Medical reconciliation performed. Patient discharged stable condition. #Subarachnoid hemorrhage secondary to fall-Repeat CT head this AM (6/1) showed stable multifocal intracranial hemorrhage, subarachnoid, subdural and intraparenchymal.  #Closed fracture of the neck of right femoral  #Fall  #Baseline confusion  #Hypertension     Plans:  Keep SBP<180, PT/OT, follow-up with surgery in 2 weeks with repeat head CT. Neurosurgery signed off. Okay to have hip surgery from neuro service point. Continue Keppra 500 twice daily for now  Orthopedics recommended conservative treatment, no surgical intervention at this time. Need SLP eval  PT/OT     DVT Prophylaxis: SCD for now, pending repeat CT head      Subjective -patient is seen and evaluated at the bedside, no acute events overnight. Patient has no new complaints today 6/4/2022.   Stable for discharge, no complaints today, dc to ARU, pending precert      Exam:     /69   Pulse 74   Temp 97.9 °F (36.6 °C) (Oral)   Resp 22   Ht 5' 8\" (1.727 m)   Wt 124 lb 1.9 oz (56.3 kg)   SpO2 98%   BMI 18.87 kg/m²   General appearance: No apparent distress, appears stated age and cooperative. HEENT: Pupils equal, round, and reactive to light. Conjunctivae/corneas clear. Neck: Supple, with full range of motion. No jugular venous distention. Trachea midline. Respiratory:  Normal respiratory effort. Clear to auscultation, bilaterally without Rales/Wheezes/Rhonchi. Cardiovascular: Regular rate and rhythm with normal S1/S2 without murmurs, rubs or gallops. Abdomen: Soft, non-tender, non-distended with normal bowel sounds. Musculoskeletal: No clubbing, cyanosis or edema bilaterally. Full range of motion without deformity. Skin: Skin color, texture, turgor normal.  No rashes or lesions. Neurologic:  Neurovascularly intact without any focal sensory/motor deficits. Cranial nerves: II-XII intact, grossly non-focal.  Psychiatric: Oriented x4, following commands appropriately  Capillary Refill: Brisk,3 seconds, normal   Peripheral Pulses: +2 palpable, equal bilaterally   Lying in bed comfortably       Consults:     IP CONSULT TO NEUROSURGERY  IP CONSULT TO CRITICAL CARE  IP CONSULT TO ORTHOPEDIC SURGERY  IP CONSULT TO PHYSICAL MEDICINE REHAB      Code Status:  Limited    Activity: activity as tolerated    Labs:  For convenience and continuity at follow-up the following most recent labs are provided:      CBC:    Lab Results   Component Value Date    WBC 6.3 06/04/2022    HGB 12.2 06/04/2022    HCT 36.0 06/04/2022     06/04/2022       Renal:    Lab Results   Component Value Date     06/04/2022    K 3.8 06/04/2022    K 4.4 05/30/2022     06/04/2022    CO2 22 06/04/2022    BUN 26 06/04/2022    CREATININE 1.0 06/04/2022    CALCIUM 8.8 06/04/2022    PHOS 2.9 06/04/2022       Discharge Medications: Current Discharge Medication List           Details   levETIRAcetam (KEPPRA) 500 MG tablet Take 1 tablet by mouth 2 times daily for 14 days  Qty: 28 tablet, Refills: 0              Details   Calcium-Phosphorus-Vitamin D (CALCIUM GUMMIES PO) Take by mouth             Time Spent on discharge is more than 30 mints in the examination, evaluation, counseling and review of medications and discharge plan. Signed:    Too Pollack MD   6/4/2022      Thank you Adriel Jones MD for the opportunity to be involved in this patient's care. If you have any questions or concerns please feel free to contact me at [phone].

## 2022-06-05 LAB
ALBUMIN SERPL-MCNC: 3.7 G/DL (ref 3.4–5)
ANION GAP SERPL CALCULATED.3IONS-SCNC: 9 MMOL/L (ref 3–16)
BUN BLDV-MCNC: 24 MG/DL (ref 7–20)
CALCIUM SERPL-MCNC: 8.7 MG/DL (ref 8.3–10.6)
CHLORIDE BLD-SCNC: 102 MMOL/L (ref 99–110)
CO2: 25 MMOL/L (ref 21–32)
CREAT SERPL-MCNC: 0.9 MG/DL (ref 0.6–1.2)
GFR AFRICAN AMERICAN: >60
GFR NON-AFRICAN AMERICAN: 59
GLUCOSE BLD-MCNC: 106 MG/DL (ref 70–99)
HCT VFR BLD CALC: 35.7 % (ref 36–48)
HEMOGLOBIN: 11.9 G/DL (ref 12–16)
MAGNESIUM: 2 MG/DL (ref 1.8–2.4)
MCH RBC QN AUTO: 31.9 PG (ref 26–34)
MCHC RBC AUTO-ENTMCNC: 33.3 G/DL (ref 31–36)
MCV RBC AUTO: 95.7 FL (ref 80–100)
PDW BLD-RTO: 12.9 % (ref 12.4–15.4)
PHOSPHORUS: 3.2 MG/DL (ref 2.5–4.9)
PLATELET # BLD: 217 K/UL (ref 135–450)
PMV BLD AUTO: 9.2 FL (ref 5–10.5)
POTASSIUM SERPL-SCNC: 4 MMOL/L (ref 3.5–5.1)
RBC # BLD: 3.73 M/UL (ref 4–5.2)
SODIUM BLD-SCNC: 136 MMOL/L (ref 136–145)
WBC # BLD: 6 K/UL (ref 4–11)

## 2022-06-05 PROCEDURE — 97530 THERAPEUTIC ACTIVITIES: CPT

## 2022-06-05 PROCEDURE — 97535 SELF CARE MNGMENT TRAINING: CPT

## 2022-06-05 PROCEDURE — 1200000000 HC SEMI PRIVATE

## 2022-06-05 PROCEDURE — 97116 GAIT TRAINING THERAPY: CPT

## 2022-06-05 PROCEDURE — 80069 RENAL FUNCTION PANEL: CPT

## 2022-06-05 PROCEDURE — 83735 ASSAY OF MAGNESIUM: CPT

## 2022-06-05 PROCEDURE — 6370000000 HC RX 637 (ALT 250 FOR IP): Performed by: INTERNAL MEDICINE

## 2022-06-05 PROCEDURE — 85027 COMPLETE CBC AUTOMATED: CPT

## 2022-06-05 PROCEDURE — 36415 COLL VENOUS BLD VENIPUNCTURE: CPT

## 2022-06-05 RX ADMIN — LEVETIRACETAM 500 MG: 500 TABLET, FILM COATED ORAL at 08:10

## 2022-06-05 RX ADMIN — LEVETIRACETAM 500 MG: 500 TABLET, FILM COATED ORAL at 19:53

## 2022-06-05 ASSESSMENT — PAIN SCALES - GENERAL
PAINLEVEL_OUTOF10: 0

## 2022-06-05 NOTE — PLAN OF CARE
Problem: Discharge Planning  Goal: Discharge to home or other facility with appropriate resources  Outcome: Progressing  Note: Pre cert pending for [facility]. Problem: Pain  Goal: Verbalizes/displays adequate comfort level or baseline comfort level  6/5/2022 0358 by Ángel Chavis RN  Outcome: Progressing  Note: Pt had no complaints of pain or discomfort this shift. Will continue to monitor. Problem: Skin/Tissue Integrity  Goal: Absence of new skin breakdown  Description: 1. Monitor for areas of redness and/or skin breakdown  2. Assess vascular access sites hourly  3. Every 4-6 hours minimum:  Change oxygen saturation probe site  4. Every 4-6 hours:  If on nasal continuous positive airway pressure, respiratory therapy assess nares and determine need for appliance change or resting period. 6/5/2022 0358 by Ángel Chavis RN  Outcome: Progressing  Note: No new signs of skin breakdown this shift. Skin assessment as documented. Pt repositions self in bed with pillow support as needed. Call light within reach. Will continue to monitor. Problem: Safety - Adult  Goal: Free from fall injury  6/5/2022 0358 by Ángel Chavis RN  Outcome: Progressing  Note: Pt remains free from falls through this shift, alert and oriented x4 with intermittent confusion at baseline, fall protocol in place, bed alarm on and in low, locked position, side rails elevated x2, nonskid footwear on. Call light and pt belongings within reach, no attempts to get oob without assistance. Will continue to monitor for pt safety.

## 2022-06-05 NOTE — PROGRESS NOTES
Occupational Therapy  Facility/Department: [facility] ICU  Daily Treatment Note  NAME: Raymond Cooper  : 1934  MRN: [id number]    Date of Service: 2022    Discharge Recommendations: Raymond Cooper scored a 16/24 on the AM-PAC ADL Inpatient form. Current research shows that an AM-PAC score of 17 or less is typically not associated with a discharge to the patient's home setting. Based on the patient's AM-PAC score and their current ADL deficits, it is recommended that the patient have 5-7 sessions per week of Occupational Therapy at d/c to increase the patient's independence. At this time, this patient demonstrates complex nursing, medical, and rehabilitative needs, and would benefit from intensive rehabilitation services upon discharge from the Inpatient setting. This patient demonstrates the ability to participate in and benefit from an intensive therapy program with a coordinated interdisciplinary team approach to foster frequent, structured, and documented communication among disciplines, who will work together to establish, prioritize, and achieve treatment goals. Please see assessment section for further patient specific details. If patient discharges prior to next session this note will serve as a discharge summary. Please see below for the latest assessment towards goals. OT Equipment Recommendations  Other: defer to next care facility      Patient Diagnosis(es): There were no encounter diagnoses. Assessment    Assessment: Pt tolerated session well, CGA w/ fxl mobility this session and standing >15min in grooming tasks at sink. Pt w/ no c/o pain t/o session, using SW w/ cues. Pt progressing, and may benefit from cont'd skilled OT while inpt and after acute d/c prior to retn to home to maximize safety and IND w/ ADL and fxl mobility.  Will cont to follow per POC  Activity Tolerance: Patient tolerated treatment well  Other: defer to next care facility      Plan   Plan  Times per Week: 5-7x  Times per Day: Daily  Current Treatment Recommendations: Functional mobility training; Endurance training; Safety education & training;Patient/Caregiver education & training;Equipment evaluation, education, & procurement;Self-Care / ADL;Cognitive reorientation     Restrictions       Subjective   Subjective  Subjective: Pt semi-supine on OT approach, agreed to tx. Family present t/o session  Pain: none reported  Orientation  Overall Orientation Status: Within Functional Limits  Orientation Level: Oriented to person;Oriented to place; Disoriented to situation  Cognition  Overall Cognitive Status: Exceptions  Arousal/Alertness: Delayed responses to stimuli  Following Commands: Follows one step commands with repetition; Follows one step commands with increased time  Safety Judgement: Decreased awareness of need for assistance;Decreased awareness of need for safety  Problem Solving: Assistance required to generate solutions;Assistance required to identify errors made  Insights: Not aware of deficits  Cognition Comment: Pt demo delay with processing. Also some difficulty w/ maintaining thought process, and word finding        Objective    Vitals  Vitals  BP: 117/73 (after 20min ADL/fxl mobility task. seated in recliner)  BP Location: Left upper arm  MAP (Calculated): 87.67  O2 Device: None (Room air)  Bed Mobility Training  Bed Mobility Training: Yes  Supine to Sit: Stand-by assistance (bed-rail used)  Scooting: Stand-by assistance (fwd at EOB)  Balance  Sitting: Intact  Standing:  (Pt stood at sink >15min completing grooming tasks, occ UE support on sink)  Transfer Training  Transfer Training: Yes  Overall Level of Assistance: Contact-guard assistance  Interventions: Safety awareness training;Verbal cues  Sit to Stand: Contact-guard assistance  Stand to Sit: Contact-guard assistance  Gait  Overall Level of Assistance: Contact-guard assistance (slow w/ shortened steps. Pt has SW in room, and demo some diff w/ SW mgt.  OT

## 2022-06-05 NOTE — PROGRESS NOTES
Physical Therapy  Daily Treatment Note    Discharge Recommendations: Willie Pascual scored a 14/24 on the AM-PAC short mobility form. Current research shows that an AM-PAC score of 17 or less is typically not associated with a discharge to the patient's home setting. Based on the patient's AM-PAC score and their current functional mobility deficits, it is recommended that the patient have 5-7 sessions per week of Physical Therapy at d/c to increase the patient's independence. At this time, this patient demonstrates complex nursing, medical, and rehabilitative needs, and would benefit from intensive rehabilitation services upon discharge from the Inpatient setting. This patient demonstrates the ability to participate in and benefit from an intensive therapy program with a coordinated interdisciplinary team approach to foster frequent, structured, and documented communication among disciplines, who will work together to establish, prioritize, and achieve treatment goals. Please see assessment section for further patient specific details. Assessment:  Mobility slow, weak and effortful. Pt needing up to Mod assist for transfers and bed mobility. Ambulation slow and weak. Needing cues for safe transfers and for PWB R LE. Pt is currently functioning below baseline for mobility s/p fall with SAH and femur fx. Would benefit from continued IP PT at D/C prior to returning home. Equipment Needs: Defer to next level of care    Chart Reviewed: Yes     Other position/activity restrictions: PWB 25-50% RLE per RN discussion with ortho, up with assist   Additional Pertinent Hx: Admit 5/30 s/p fall at [address]; neurosurg consulted; (+) traumatic SAH; R hip MRI: (+) Nondisplaced fracture in the right greater trochanter; ortho consulted - conservative management;  PMHx: GERD, L forearm surgery 2020, L knee surgery, multiple falls per pt report      Diagnosis: SAH, R hip fx   Treatment Diagnosis: impaired gait and transfers    Subjective: Pt in chair initially. Agreeable to working with PT. \"I need to go to the bathroom. \"  \"One of you was in here already today. \" (re: OT)  \"They told me not to put all of my weight on my leg. \"    Pain: Denies    Objective:    Transfers  Sit to stand: Mod assist x 1 from chair; Min assist x 1 from raised height commode with armrests. Cues for hand placement/pushing up from sitting surface. Stand to sit: Min assist x 1 onto raised height commode with armrests; Min assist x 1 onto bed. Cues for hand placement/reaching back for sitting surface. Ambulation  Assistance Level: Min assist x 1  Assistive device: Wheeled walker  Distance: 10 ft, 4 ft, 10 ft  Quality of gait: Step-to pattern; decreased step length/heigth; slow; weak; flexed posture; moderate reliance on walker for support  Other: Pt appeared to be maintaining PWB R LE for part of the time, but not consistently. Bed mobility  Sit to Supine: Mod assist for LEs    Balance  Static stance with wheeled walker Min assist x 1  Sat on commode with SBA  Ambulation with wheeled walker  Min assist x 1  Sat EOB with CGA    Patient Education  PWB (25-50%) R LE. Pt expressed understanding, but does not always maintain. Role of PT. Calling for assist with needs. Expressed understanding. Safety Devices  Pt left with needs in reach. In bed with bed alarm on. RN updated.      AM-PAC score  AM-PAC Inpatient Mobility Raw Score : 14  AM-PAC Inpatient T-Scale Score : 38.1  Mobility Inpatient CMS 0-100% Score: 61.29  Mobility Inpatient CMS G-Code Modifier : CL    Goals: (as determined and assessed by primary PT)  Time Frame for Short term goals: discharge  Short term goal 1: Pt will transfer supine <--> sit with supervision with HOB flat   Short term goal 2: Pt will transfer sit <--> stand with supervision   Short term goal 3: Pt will amb 50 ft with walker and supervision  Short term goal 4: Pt will verbalize good understanding of R LE WB status Plan:  Plan weeks: 5-7   Current Treatment Recommendations: Functional mobility training,Balance training,Transfer training,Therapeutic activities,Patient/Caregiver education & training,Equipment evaluation, education, & procurement    Therapy Time    Individual  Concurrent  Group  Co-treatment    Time In  1208            Time Out  1236            Minutes  28              Timed Code Treatment Minutes: 28  Total Treatment Minutes: 28    Will continue per plan of care. If patient is discharged prior to next treatment, this note will serve as the discharge summary.     Humphrey Hubbard #[id number]

## 2022-06-05 NOTE — CARE COORDINATION
Case management is following for discharge planning. Anticipate a discharge to [facility] ARU. Spoke with James Mon in admissions. Sreedhar pre-cert has still not been received.      Electronically signed by LAUREN Delgado RN-[facility]  [phone]

## 2022-06-05 NOTE — DISCHARGE SUMMARY
discharge, no complaints today, dc to ARU, pending precert, no change in plan 6/5/2022      Exam:     /63   Pulse 78   Temp 97.9 °F (36.6 °C) (Oral)   Resp 22   Ht 5' 8\" (1.727 m)   Wt 124 lb 1.9 oz (56.3 kg)   SpO2 99%   BMI 18.87 kg/m²   General appearance: No apparent distress, appears stated age and cooperative. HEENT: Pupils equal, round, and reactive to light. Conjunctivae/corneas clear. Neck: Supple, with full range of motion. No jugular venous distention. Trachea midline. Respiratory:  Normal respiratory effort. Clear to auscultation, bilaterally without Rales/Wheezes/Rhonchi. Cardiovascular: Regular rate and rhythm with normal S1/S2 without murmurs, rubs or gallops. Abdomen: Soft, non-tender, non-distended with normal bowel sounds. Musculoskeletal: No clubbing, cyanosis or edema bilaterally. Full range of motion without deformity. Skin: Skin color, texture, turgor normal.  No rashes or lesions. Neurologic:  Neurovascularly intact without any focal sensory/motor deficits. Cranial nerves: II-XII intact, grossly non-focal.  Psychiatric: Oriented x4, following commands appropriately  Capillary Refill: Brisk,3 seconds, normal   Peripheral Pulses: +2 palpable, equal bilaterally   Lying in bed comfortably, no change in exam 6/5/2022       Consults:     IP CONSULT TO NEUROSURGERY  IP CONSULT TO CRITICAL CARE  IP CONSULT TO ORTHOPEDIC SURGERY  IP CONSULT TO PHYSICAL MEDICINE REHAB      Code Status:  Limited    Activity: activity as tolerated    Labs:  For convenience and continuity at follow-up the following most recent labs are provided:      CBC:    Lab Results   Component Value Date    WBC 6.0 06/05/2022    HGB 11.9 06/05/2022    HCT 35.7 06/05/2022     06/05/2022       Renal:    Lab Results   Component Value Date     06/05/2022    K 4.0 06/05/2022    K 4.4 05/30/2022     06/05/2022    CO2 25 06/05/2022    BUN 24 06/05/2022    CREATININE 0.9 06/05/2022    CALCIUM 8.7 06/05/2022    PHOS 3.2 06/05/2022       Discharge Medications:     Current Discharge Medication List           Details   levETIRAcetam (KEPPRA) 500 MG tablet Take 1 tablet by mouth 2 times daily for 14 days  Qty: 28 tablet, Refills: 0              Details   Calcium-Phosphorus-Vitamin D (CALCIUM GUMMIES PO) Take by mouth             Time Spent on discharge is more than 30 mints in the examination, evaluation, counseling and review of medications and discharge plan. Signed:    Bere Walls MD   6/5/2022      Thank you Valeria Campoverde MD for the opportunity to be involved in this patient's care. If you have any questions or concerns please feel free to contact me at [phone].

## 2022-06-06 ENCOUNTER — HOSPITAL ENCOUNTER (INPATIENT)
Age: 87
LOS: 5 days | Discharge: ANOTHER HOSPITAL | DRG: 560 | End: 2022-06-11
Attending: PHYSICAL MEDICINE & REHABILITATION | Admitting: PHYSICAL MEDICINE & REHABILITATION
Payer: MEDICARE

## 2022-06-06 VITALS
TEMPERATURE: 98 F | RESPIRATION RATE: 20 BRPM | OXYGEN SATURATION: 98 % | WEIGHT: 124.12 LBS | DIASTOLIC BLOOD PRESSURE: 73 MMHG | HEART RATE: 80 BPM | SYSTOLIC BLOOD PRESSURE: 125 MMHG | BODY MASS INDEX: 18.81 KG/M2 | HEIGHT: 68 IN

## 2022-06-06 PROBLEM — S72.001A HIP FRACTURE, RIGHT, CLOSED, INITIAL ENCOUNTER (HCC): Status: ACTIVE | Noted: 2022-06-06

## 2022-06-06 LAB
ALBUMIN SERPL-MCNC: 3.6 G/DL (ref 3.4–5)
ANION GAP SERPL CALCULATED.3IONS-SCNC: 11 MMOL/L (ref 3–16)
BUN BLDV-MCNC: 29 MG/DL (ref 7–20)
CALCIUM SERPL-MCNC: 9.1 MG/DL (ref 8.3–10.6)
CHLORIDE BLD-SCNC: 101 MMOL/L (ref 99–110)
CO2: 25 MMOL/L (ref 21–32)
CREAT SERPL-MCNC: 1 MG/DL (ref 0.6–1.2)
GFR AFRICAN AMERICAN: >60
GFR NON-AFRICAN AMERICAN: 52
GLUCOSE BLD-MCNC: 107 MG/DL (ref 70–99)
HCT VFR BLD CALC: 36.4 % (ref 36–48)
HEMOGLOBIN: 12.3 G/DL (ref 12–16)
MAGNESIUM: 1.9 MG/DL (ref 1.8–2.4)
MCH RBC QN AUTO: 32.3 PG (ref 26–34)
MCHC RBC AUTO-ENTMCNC: 33.9 G/DL (ref 31–36)
MCV RBC AUTO: 95.3 FL (ref 80–100)
PDW BLD-RTO: 12.8 % (ref 12.4–15.4)
PHOSPHORUS: 3.2 MG/DL (ref 2.5–4.9)
PLATELET # BLD: 240 K/UL (ref 135–450)
PMV BLD AUTO: 10 FL (ref 5–10.5)
POTASSIUM SERPL-SCNC: 4.1 MMOL/L (ref 3.5–5.1)
RBC # BLD: 3.81 M/UL (ref 4–5.2)
SARS-COV-2, NAAT: NOT DETECTED
SODIUM BLD-SCNC: 137 MMOL/L (ref 136–145)
WBC # BLD: 6.5 K/UL (ref 4–11)

## 2022-06-06 PROCEDURE — 80069 RENAL FUNCTION PANEL: CPT

## 2022-06-06 PROCEDURE — 97530 THERAPEUTIC ACTIVITIES: CPT

## 2022-06-06 PROCEDURE — 36415 COLL VENOUS BLD VENIPUNCTURE: CPT

## 2022-06-06 PROCEDURE — 87635 SARS-COV-2 COVID-19 AMP PRB: CPT

## 2022-06-06 PROCEDURE — 99232 SBSQ HOSP IP/OBS MODERATE 35: CPT | Performed by: PHYSICAL MEDICINE & REHABILITATION

## 2022-06-06 PROCEDURE — 85027 COMPLETE CBC AUTOMATED: CPT

## 2022-06-06 PROCEDURE — 97110 THERAPEUTIC EXERCISES: CPT

## 2022-06-06 PROCEDURE — 97116 GAIT TRAINING THERAPY: CPT

## 2022-06-06 PROCEDURE — 6370000000 HC RX 637 (ALT 250 FOR IP): Performed by: INTERNAL MEDICINE

## 2022-06-06 PROCEDURE — 1280000000 HC REHAB R&B

## 2022-06-06 PROCEDURE — 97535 SELF CARE MNGMENT TRAINING: CPT

## 2022-06-06 PROCEDURE — 94760 N-INVAS EAR/PLS OXIMETRY 1: CPT

## 2022-06-06 PROCEDURE — 83735 ASSAY OF MAGNESIUM: CPT

## 2022-06-06 RX ORDER — ACETAMINOPHEN 325 MG/1
650 TABLET ORAL EVERY 4 HOURS PRN
Status: CANCELLED | OUTPATIENT
Start: 2022-06-06

## 2022-06-06 RX ORDER — ACETAMINOPHEN 325 MG/1
650 TABLET ORAL EVERY 4 HOURS PRN
Status: DISCONTINUED | OUTPATIENT
Start: 2022-06-06 | End: 2022-06-07

## 2022-06-06 RX ORDER — POLYETHYLENE GLYCOL 3350 17 G/17G
17 POWDER, FOR SOLUTION ORAL DAILY PRN
Status: DISCONTINUED | OUTPATIENT
Start: 2022-06-06 | End: 2022-06-11

## 2022-06-06 RX ORDER — HYDRALAZINE HYDROCHLORIDE 20 MG/ML
10 INJECTION INTRAMUSCULAR; INTRAVENOUS EVERY 6 HOURS PRN
Status: CANCELLED | OUTPATIENT
Start: 2022-06-06

## 2022-06-06 RX ORDER — ONDANSETRON 2 MG/ML
4 INJECTION INTRAMUSCULAR; INTRAVENOUS EVERY 6 HOURS PRN
Status: DISCONTINUED | OUTPATIENT
Start: 2022-06-06 | End: 2022-06-11

## 2022-06-06 RX ORDER — HYDRALAZINE HYDROCHLORIDE 20 MG/ML
10 INJECTION INTRAMUSCULAR; INTRAVENOUS EVERY 6 HOURS PRN
Status: DISCONTINUED | OUTPATIENT
Start: 2022-06-06 | End: 2022-06-11 | Stop reason: HOSPADM

## 2022-06-06 RX ORDER — ONDANSETRON 2 MG/ML
4 INJECTION INTRAMUSCULAR; INTRAVENOUS EVERY 6 HOURS PRN
Status: CANCELLED | OUTPATIENT
Start: 2022-06-06

## 2022-06-06 RX ORDER — POLYETHYLENE GLYCOL 3350 17 G/17G
17 POWDER, FOR SOLUTION ORAL DAILY PRN
Status: CANCELLED | OUTPATIENT
Start: 2022-06-06

## 2022-06-06 RX ORDER — ONDANSETRON 4 MG/1
4 TABLET, ORALLY DISINTEGRATING ORAL EVERY 8 HOURS PRN
Status: CANCELLED | OUTPATIENT
Start: 2022-06-06

## 2022-06-06 RX ORDER — ONDANSETRON 4 MG/1
4 TABLET, ORALLY DISINTEGRATING ORAL EVERY 8 HOURS PRN
Status: DISCONTINUED | OUTPATIENT
Start: 2022-06-06 | End: 2022-06-11

## 2022-06-06 RX ADMIN — LEVETIRACETAM 500 MG: 500 TABLET, FILM COATED ORAL at 08:06

## 2022-06-06 ASSESSMENT — PAIN SCALES - GENERAL
PAINLEVEL_OUTOF10: 0
PAINLEVEL_OUTOF10: 0

## 2022-06-06 NOTE — CARE COORDINATION
Case Management Assessment            Discharge Note                    Date / Time of Note: 6/6/2022 8:28 AM                  Discharge Note Completed by: Fran Castaneda RN     Anticipate a discharge to the [address] [place] [address] Unit today. Still waiting for [facility] Medicare pre-cert. It was started 6/2    Patient Name: Agata Chapin   YOB: 1934  Diagnosis: Subarachnoid bleed ([facility] [address] [I60.9]  Intracranial bleed [facility]) [I62.9]   Date / Time: 5/30/2022  4:33 PM    Current PCP: Valeria Campoverde MD    Advance Directives:  Code Status: Limited    Financial:  Payor: Neptali Bustillos / Plan: David Kos / Product Type: *No Product type* /      Pharmacy:    [address] [place], [place] [address] [address]  Phone: [phone] Fax: [phone]    [facility] [id number] [address] [phone] [facility] [phone]  [address] [facility] [address]  Phone: [phone] Fax: [phone]    Merged with [id number] - [place], [phone] [place] [phone]  [address] [place] [address]  Phone: [phone] Fax: [phone]      Assistance purchasing medications?: Potential Assistance Purchasing Medications: No  Assistance provided by Case Management: None at this time    Does patient want to participate in local refill/ meds to beds program?: Yes    Meds To Beds General Rules:  1. Can ONLY be done Monday- Friday between 8:30am-5pm  2. Prescription(s) must be in pharmacy by 3pm to be filled same day  3. Copy of patient's insurance/ prescription drug card and patient face sheet must be sent along with the prescription(s)  4. Cost of Rx cannot be added to hospital bill. If financial assistance is needed, please contact unit  or ;  or  CANNOT provide pharmacy voucher for patients co-pays  5.  Patients can then  the prescription on their way out of the hospital at discharge, or pharmacy can deliver to the bedside if staff is available. (payment due at time of pick-up or delivery - cash, check, or card accepted)     Able to afford home medications/ co-pay costs: Yes    ADLS:  Current PT AM-PAC Score: 14 /24  Current OT AM-PAC Score: 16 /24      [facility]    IMM Completed:   Not Indicated    Transportation:  Intrahospital transport    The Patient and/or patient representative Julius Rivera and her family were provided with a choice of provider and agrees with the discharge plan Yes    Freedom of choice list was provided with basic dialogue that supports the patient's individualized plan of care/goals and shares the quality data associated with the providers.  Yes      Maday Garza RN  [facility]  Case Management Department  [phone]

## 2022-06-06 NOTE — PROGRESS NOTES
Occupational Therapy  Facility/Department: [facility] ICU  Daily Treatment Note  NAME: Alm Ganser  : 1934  MRN: [id number]    Date of Service: 2022    Discharge Recommendations: Alm Ganser scored a 16/24 on the AM-PAC ADL Inpatient form. Current research shows that an AM-PAC score of 17 or less is typically not associated with a discharge to the patient's home setting. Based on the patient's AM-PAC score and their current ADL deficits, it is recommended that the patient have 5-7 sessions per week of Occupational Therapy at d/c to increase the patient's independence. At this time, this patient demonstrates complex nursing, medical, and rehabilitative needs, and would benefit from intensive rehabilitation services upon discharge from the Inpatient setting. This patient demonstrates the ability to participate in and benefit from an intensive therapy program with a coordinated interdisciplinary team approach to foster frequent, structured, and documented communication among disciplines, who will work together to establish, prioritize, and achieve treatment goals. Please see assessment section for further patient specific details. If patient discharges prior to next session this note will serve as a discharge summary. Please see below for the latest assessment towards goals. Patient Diagnosis(es): There were no encounter diagnoses. Assessment         Assessment: Pt tolerated session well, CGA w/ fxl mobility this session. Needing ++time for ADLs, transfers and funct mob. Pt w/ no c/o pain t/o session, using RW with cues for following WB prect. Pt would benefit from cont'd skilled OT while inpt and after acute d/c prior to retn to home to maximize safety and IND w/ ADL and fxl mobility.  Will cont to follow per POC      Activity Tolerance: Patient tolerated treatment well      Plan   Plan  Times per Week: 5-7x  Times per Day: Daily     Restrictions       Subjective Subjective  Subjective: Pt in chair upon arrival. Pleasant and agreeable to OT  Pain: none reported           Objective    Vitals     Bed Mobility Training  Bed Mobility Training: Yes  Sit to Supine: Minimum assistance (For LLE)  Scooting: Stand-by assistance (towars edge of chair)      Balance  Sitting: Intact  Standing: With support (SBA to CGA, cues to follow WB prect. ~15 minutes)      Transfer Training  Transfer Training: Yes  Sit to Stand: Minimum assistance; Additional time  Stand to Sit: Contact-guard assistance  Toilet Transfer: Contact-guard assistance; Additional time (grab bar used, regular toilet, ambulating)      Gait  Overall Level of Assistance: Contact-guard assistance  Interventions: Verbal cues; Weight shifting training/pressure relief;Manual cues (to follow WB prect)  Distance (ft): 30 Feet (to/from bathroom)  Assistive Device: Gait belt;Walker, rolling         ADL  Grooming: Contact guard assistance  Grooming Skilled Clinical Factors: Oral care (removal of upper and lower plates, brushing plates, replacing. rinse mouth w/ mouthwash), wash face, wash hands, comb hair in stance at sink w/ CGA. Pt occ using sink for add'l support w/ UE  LE Dressing: Moderate assistance (for threading LLE. Able to pull up pass hips with CGA for stance and ++time)  Toileting: Minimal assistance (Min A for stance for clothing mgm to maintain WB prect.  Able to provide self mindi care seated on toilet)        Safety Devices  Type of Devices: Left in bed;Bed alarm in place;Call light within reach;Nurse notified         Patient Education  Education Given To: Patient  Education Provided: Role of Therapy;Plan of Care;ADL Adaptive Strategies;Transfer Training;Precautions  Education Method: Demonstration;Verbal  Barriers to Learning: None  Education Outcome: Continued education needed      Goals  Short Term Goals  Time Frame for Short term goals: at d/c  Short Term Goal 1: Stance x 8 mins with Supervision for ADLs /IADLs- not met  Short Term Goal 2: LE Dressing with CGA and AEprn- not met  Short Term Goal 3: Commode transfers with Mod independence- not met  Short Term Goal 4: Oriented to environment 4/5 attempts- not met  Short Term Goal 5: Follow PWB precautions RLE with < 1 v.cues during transfers/ mobility- not met  Patient Goals   Patient goals : Unable to state       Therapy Time   Individual Concurrent Group Co-treatment   Time In 1400         Time Out 1441         Minutes 41         Timed Code Treatment Minutes: [address], [place]/L

## 2022-06-06 NOTE — PROGRESS NOTES
Hospitalist Progress Note      PCP: Sandi Brand MD    Date of Admission: 5/30/2022    Chief Complaint: Fall to head    Subjective: Seen and examined at bedside. Medications:  Reviewed    Infusion Medications     Scheduled Medications     PRN Meds: hydrALAZINE, acetaminophen, ondansetron **OR** ondansetron, perflutren lipid microspheres    No intake or output data in the 24 hours ending 06/06/22 1436    Physical Exam Performed:    /61   Pulse 74   Temp 97.9 °F (36.6 °C) (Oral)   Resp 18   Ht 5' 8\" (1.727 m)   Wt 124 lb 1.9 oz (56.3 kg)   SpO2 98%   BMI 18.87 kg/m²     General appearance: No apparent distress, appears stated age and cooperative. HEENT: Pupils equal, round, and reactive to light. Conjunctivae/corneas clear. Neck: Supple, with full range of motion. No jugular venous distention. Trachea midline. Respiratory:  Normal respiratory effort. Clear to auscultation, bilaterally without Rales/Wheezes/Rhonchi. Cardiovascular: Regular rate and rhythm with normal S1/S2 without murmurs, rubs or gallops. Abdomen: Soft, non-tender, non-distended with normal bowel sounds. Musculoskeletal: No clubbing, cyanosis or edema bilaterally. Full range of motion without deformity. Skin: Skin color, texture, turgor normal.  No rashes or lesions. Neurologic:  Neurovascularly intact without any focal sensory/motor deficits.  Cranial nerves: II-XII intact, grossly non-focal.  Psychiatric: Oriented x4, following commands appropriately  Capillary Refill: Brisk,3 seconds, normal   Peripheral Pulses: +2 palpable, equal bilaterally       Labs:   Recent Labs     06/04/22 0417 06/05/22  0322 06/06/22  0309   WBC 6.3 6.0 6.5   HGB 12.2 11.9* 12.3   HCT 36.0 35.7* 36.4    217 240     Recent Labs     06/04/22 0417 06/05/22  0322 06/06/22  0309    136 137   K 3.8 4.0 4.1    102 101   CO2 22 25 25   BUN 26* 24* 29*   CREATININE 1.0 0.9 1.0   CALCIUM 8.8 8.7 9.1   PHOS 2.9 3.2 3.2     No results for input(s): AST, ALT, BILIDIR, BILITOT, ALKPHOS in the last 72 hours. No results for input(s): INR in the last 72 hours. No results for input(s): Lorenso Favre in the last 72 hours. Urinalysis:      Lab Results   Component Value Date    NITRU Negative 05/30/2022    WBCUA 0-2 05/30/2022    BACTERIA Rare 05/30/2022    RBCUA 0-2 05/30/2022    BLOODU TRACE-INTACT 05/30/2022    SPECGRAV 1.010 05/30/2022    GLUCOSEU Negative 05/30/2022       Radiology:  CT HEAD WO CONTRAST   Final Result      Essentially stable multifocal intracranial hemorrhage, subarachnoid, subdural and intraparenchymal.      MRI HIP RIGHT WO CONTRAST   Final Result      1. Nondisplaced fracture in the right greater trochanter. 2.  Edema/contusion in the subcutaneous tissues over the right hip. CT HIP RIGHT WO CONTRAST   Final Result   1. Nondisplaced fracture the cortex of the greater trochanter and irregularity of the femoral neck with underlying osteoporosis. Occult fracture and contusion are considered. Further evaluation with magnetic resonance imaging is recommended to assess the    degree of marrow edema. 2. Underlying osteoarthritis of the femoral acetabular joint. CT HEAD WO CONTRAST   Final Result      Minimal progression in extra-axial hemorrhage in the right vertex as well as tiny amount of layering intraventricular hemorrhage. Otherwise, intra-axial and extra-axial hemorrhage are without change. XR SHOULDER RIGHT (MIN 2 VIEWS)   Final Result     No acute findings in the right shoulder. CT HEAD WO CONTRAST   Final Result   1. Questionable minimal improvement in the subarachnoid    hemorrhage, as detailed above. 2.  The intraparenchymal focus involving the right high    frontoparietal region is stable in appearance and morphology. No    new interval intracranial hemorrhage identified. 3.  No significant new mass effect or midline shift.       XR HIP 2-3 VW W PELVIS RIGHT   Final Result      Lucency in the right femoral head neck junction could be artifactual but a subcapital fracture is difficult to exclude. CT recommended. XR ELBOW RIGHT (MIN 3 VIEWS)   Final Result      No evidence of fracture. CT head without contrast   Final Result      1. Increase in area of the right frontal and temporal subarachnoid hemorrhage. 2.  Slightly increased size of the right frontal convexity acute subdural hematoma. 3.  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 4.  Stable high right frontal lobe hemorrhagic cortical contusions. 5.  No new mass effect or shift. Assessment/Plan:    Active Hospital Problems    Diagnosis     Intracranial bleed ([address] [I62.9]      Priority: Medium    Closed fracture of neck of right femur ([address] [S72.001A]      Priority: Medium    Subarachnoid bleed ([facility]) [I60.9]      Priority: Medium     #Subarachnoid hemorrhage secondary to fall-Repeat CT head this AM (6/1) showed stable multifocal intracranial hemorrhage, subarachnoid, subdural and intraparenchymal.  #Closed fracture of the neck of right femoral  #Fall  #Baseline confusion  #Hypertension    Plans:  Keep SBP<180, PT/OT, follow-up with surgery in 2 weeks with repeat head CT. Neurosurgery signed off. Continue Keppra 500 twice daily for now  Orthopedics recommended conservative treatment, no surgical intervention at this time. Need SLP eval  PT/OT    DVT Prophylaxis: SCD for now, pending repeat CT head  Diet: ADULT DIET; Regular  ADULT ORAL NUTRITION SUPPLEMENT; Breakfast, Dinner; Standard High Calorie/High Protein Oral Supplement  ADULT ORAL NUTRITION SUPPLEMENT; Lunch; Frozen Oral Supplement  Code Status: Limited    PT/OT Eval Status: ordered    Dispo -likely stable for discharge. Needs placement.   Bonnie Kaminski MD

## 2022-06-06 NOTE — DISCHARGE SUMMARY
Hospital Discharge Summary    Patient's PCP: Segun Verde MD  Admit Date: 5/30/2022   Discharge Date: 6/6/22    Admitting Physician: Dr. Mil Myers MD  Discharge Physician: Dr. Katie Miller MD     Consults:   IP CONSULT TO NEUROSURGERY  IP CONSULT TO CRITICAL CARE  IP CONSULT TO ORTHOPEDIC SURGERY  IP CONSULT TO PHYSICAL MEDICINE REHAB    Brief HPI: Parish Stroud is an 68-year-old female presents to the hospital secondary to a fall to the head.  PMH includes HTN, history CVA? (lacunar stroke), HLD, baseline confusion.  Patient was at [facility] earlier today and was found on the floor with a laceration to the forehead.  Ultimately brought to [facility] for CT head was completed showing a right frontal subarachnoid hemorrhage extending into the sylvian fissure.  Neurosurgery was consulted recommending Keppra and CTA head and neck and transferred to [facility].     On discussion with the patient has had multiple falls in the last year. Uses a cane but did not have cane when fall occurred. Denies recollection of the fall but no light headedness before, no loss of bowel or bladder after and denies any tongue biting. Video camera at location appears to be mechanical fall in nature per niece. Otherwise well right now with no major complaints    Brief hospital course:     1.   Recurrent hemorrhage secondary to fall repeat CT head showed stable multifocal intracranial hemorrhage subarachnoid subdural intraparenchymal  -Neurosurgery consulted  Keppra twice daily  Follow-up with neurosurgery in 2 weeks for repeat stat CT head    2.  Closed fracture of neck of right femoral  -Ortho consulted and recommending conservative management no surgical intervention at this time    Patient discharged to ARU      Invasive procedures:  None     Discharge Diagnoses:   Principal Problem:    Subarachnoid bleed ([address]  Active Problems:    Closed fracture of neck of right femur ([address]    Intracranial bleed ([address]  Resolved Problems: * No resolved hospital problems. *      Physical Exam: /73   Pulse 80   Temp 98 °F (36.7 °C) (Oral)   Resp 20   Ht 5' 8\" (1.727 m)   Wt 124 lb 1.9 oz (56.3 kg)   SpO2 98%   BMI 18.87 kg/m²   Gen/overall appearance: Not in acute distress. Alert. Head: Normocephalic, atraumatic  Eyes: EOMI, good acuity  ENT:- Oral mucosa moist  Neck: No JVD, thyromegaly  CVS: Nml S1S2, no MRG, RRR  Pulm: Clear bilaterally. No crackles/wheezes  Gastrointestinal: Soft, NT/ND, +BS  Musculoskeletal: No edema. Warm  Neuro: No focal deficit. Moves extremity spontaneously. Psychiatry: Appropriate affect. Not agitated. Skin: Warm, dry with normal turgor. No rash        Significant diagnostic studies that may require follow up:  Echo Complete    Result Date: 5/31/2022  Transthoracic Echocardiography Report (TTE)  Demographics   Patient Name       Nelson Ced   Date of Study      05/31/2022        Gender              Female   Patient Number     [id number]        Date of Birth       1934   Visit Number       [id number]         Age                 80 year(s)   Accession Number   [id number]        Room Number         8652   Corporate ID       [id number]           Sonographer         Lore Simmons,                                                           [facility], [facility]   Ordering Physician Vicky Bills  Interpreting        [facility]                                       Physician           Marcus Blank MD  Procedure Type of Study   TTE procedure:ECHOCARDIOGRAM COMPLETE 2D W DOPPLER W COLOR. Procedure Date Date: 05/31/2022 Start: 02:39 PM Study Location: [address] Echo Lab Technical Quality: Limited visualization due to poor acoustical window. Indications:Syncope. Patient Status: Routine Contrast Medium: Bubble Study.  Amount - 10 ml Height: 68 inches Weight: 132 pounds BSA: 1.71 m2 BMI: 20.07 kg/m2 BP: 127/69 mmHg  Conclusions   Summary  Left ventricular cavity size is normal. Normal left ventricular wall  thickness. Overall left ventricular systolic function appears normal with an  ejection fraction of 55-60 %. No regional wall motion abnormalities are  noted. Normal diastolic function. Estimated pulmonary artery systolic pressure is at 29 mmHg assuming a right  atrial pressure of 15 mmHg. IVC size is dilated (>2.1 cm) and collapses < 50% with respiration  consistent with elevated RA pressure (15 mmHg). A bubble study was performed and fails to show evidence of right to left  shunting   Signature   ------------------------------------------------------------------  Electronically signed by Rolanda Cespedes MD (Interpreting  physician) on 05/31/2022 at 05:00 PM  ------------------------------------------------------------------   Findings   Left Ventricle  Left ventricular cavity size is normal. Normal left ventricular wall  thickness. Overall left ventricular systolic function appears normal with an  ejection fraction of 55-60 %. No regional wall motion abnormalities are  noted. Normal diastolic function. Mitral Valve  The mitral valve leaflets appear normal in structure. Mitral annular  calcification is present. Trivial mitral regurgitation is present. No  evidence of mitral valve stenosis. Left Atrium  The left atrium is normal in size. A bubble study was performed and fails to  show evidence of right to left shunting. Aortic Valve  The aortic valve leaflets are slightly thickened /calcified. No evidence of  aortic valve regurgitation. No evidence of aortic valve stenosis. Aorta  The aortic root is normal in size. Right Ventricle  The right ventricle is not well visualized. TAPSE measures 3.01 cm and the RVS velocity measures 17.2 cm/s. Tricuspid Valve  Tricuspid valve leaflets are structurally normal. Trace tricuspid  regurgitation. No evidence of tricuspid stenosis.    Right Atrium  The right atrial size is normal.   Pulmonic Valve The pulmonic valve is not well visualized. Trace pulmonic regurgitation present. No evidence of pulmonic valve stenosis. Pericardial Effusion  No pericardial effusion noted. Pleural Effusion  No pleural effusion. Miscellaneous  IVC size is dilated (>2.1 cm) and collapses < 50% with respiration  consistent with elevated RA pressure (15 mmHg). Estimated pulmonary artery  systolic pressure is at 29 mmHg assuming a right atrial pressure of 15 mmHg. M-Mode/2D Measurements (cm)   LV Diastolic Dimension: 4.53 cm LV Systolic Dimension: 1.23 cm  LV Septum Diastolic: 9.33 cm  LV PW Diastolic: 2.26 cm        AO Root Dimension: 2.6 cm  RV Diastolic Dimension: 8.38 cm LA Dimension: 3.7 cm                                  LA Area: 13.9 cm2                                  LA volume/Index: 34.3 ml /20 ml/m2  Doppler Measurements   AV Peak Velocity: 143 cm/s     MV Peak E-Wave: 70.2 cm/s  AV Peak Gradient: 8.18 mmHg    MV Peak A-Wave: 119 cm/s  LVOT Peak Velocity: 132 cm/s   MV E/A Ratio: 0.59                                 MV P1/2t: 86 msec  TR Velocity:193 cm/s  TR Gradient:14.9 mmHg  Estimated RAP:15 mmHg  Estimated RVSP: 29 mmHg        MV Deceleration Time: 292 msec  E' Septal Velocity: 5.52 cm/s  MV Area (PHT): 2.56 cm2  E' Lateral Velocity: 8.06 cm/s  E/E' ratio: 8.7  PV Peak Velocity: 87.2 cm/s  PV Peak Gradient: 3.04 mmHg   Aortic Valve   Peak Velocity: 143 cm/s  Peak Gradient: 8.18 mmHg  Aorta   Aortic Root: 2.6 cm      XR SHOULDER RIGHT (MIN 2 VIEWS)    Result Date: 5/31/2022  Patient: Lore Manuel  Time Out: 04:47 Exam(s): FILM RIGHT SHOULDER  EXAM:   XR Right Shoulder Complete, 2 or More Views  CLINICAL HISTORY:   fall on right. TECHNIQUE:   Two or more views of the right shoulder. COMPARISON:   No relevant prior studies available. FINDINGS:   Bones/joints:  Mild degenerative changes of the acromioclavicular joint. No acute fracture. No dislocation.    Soft tissues:  No significant overlying soft tissue abnormality. No radiopaque foreign body or subcutaneous emphysema. Electronically signed by Patricia Parra MD on [phone] at [phone]      No acute findings in the right shoulder. XR SHOULDER RIGHT (MIN 2 VIEWS)    Result Date: 5/30/2022  EXAMINATION: THREE XRAY VIEWS OF THE RIGHT SHOULDER 5/30/2022 2:12 pm COMPARISON: None. HISTORY: ORDERING SYSTEM PROVIDED HISTORY: fall TECHNOLOGIST PROVIDED HISTORY: Reason for exam:->fall FINDINGS: Skeletal structures are intact. No fracture or dislocation. No significant soft tissue abnormalities. No acute finding of the right shoulder. XR ELBOW RIGHT (MIN 3 VIEWS)    Result Date: 5/30/2022  EXAM: RIGHT ELBOW 3 VIEWS INDICATION: Fall. Right elbow pain. COMPARISON: None FINDINGS: No acute fracture identified. No malalignment. No evidence of an elbow effusion. Overlying wrap/ bandage. No evidence of fracture. CT HEAD WO CONTRAST    Result Date: 6/1/2022  CT HEAD WITHOUT CONTRAST: REASON FOR EXAM: Intracranial hemorrhage TECHNIQUE: Axial CT imaging obtained from vertex to skull base. Axial images and multiplanar reformatted images reviewed. Individualized dose optimization technique was used in order to meet ALARA standards for radiation dose reduction. In addition to  vendor specific dose reduction algorithms, the dose reduction techniques vary based on the specific scanner utilized but frequently include automated exposure control, adjustment of the mA and/or kV according to patient size, and use of iterative reconstruction technique. COMPARISON: 5/31/2022 FINDINGS: There is scattered subarachnoid hemorrhage predominantly within the right frontal parietal lobe, which is similar in appearance to the prior study. Right frontal intraparenchymal contusion noted with mild surrounding edema, unchanged. Isodense bilateral frontal convexity subdural hematomas along with subdural hemorrhage at the mid aspect of the falx are unchanged.  Intraventricular hemorrhage within the bilateral temporal horns is decreased in the interval. Overall, the extent of intracranial hemorrhage is not significantly changed. There is no new intracranial hemorrhage, midline shift, or mass effect. Gray-white differentiation is maintained. Ventricles are normal in size and position. Basal cisterns are preserved. Visualized paranasal sinuses and mastoid air cells are clear. No acute or suspicious bony abnormality. Essentially stable multifocal intracranial hemorrhage, subarachnoid, subdural and intraparenchymal.    CT HEAD WO CONTRAST    Result Date: 5/31/2022  CT head without contrast HISTORY: Confusion. Not following commands. Subarachnoid hemorrhage. Subdural hemorrhage. COMPARISON: 5/31/2022 at [address]:  none  TECHNIQUE: Individualized dose optimization technique was used in order to meet ALARA standards for radiation dose reduction. In addition to vendor specific dose reduction algorithms, the dose reduction techniques vary based on the specific scanner utilized but frequently include automated exposure control, adjustment of the mA and/or kV according to patient size, and use of iterative reconstruction technique. COMMENTS: Trace bilateral occipital horn intraventricular hemorrhage is mildly increased. Small new linear focus of extra-axial hemorrhage in the right vertex image 54 was not seen previously. Right frontal parenchymal contusions with mild surrounding edema without change. Small hyperdense foci within the predominantly isodense right subdural fluid collection again noted. Isodense left subdural collection again noted. Right temporoparietal subarachnoid hemorrhage without change. No significant change in the small subdural collection in the midline posterior falx on image 49. Sinuses and mastoids are clear. Minimal progression in extra-axial hemorrhage in the right vertex as well as tiny amount of layering intraventricular hemorrhage.  Otherwise, intra-axial and extra-axial hemorrhage are without change. CT HEAD WO CONTRAST    Result Date: 5/31/2022  Patient: Medardo Lcuas  Time Out: 04:42 Exam(s): CT HEAD Without Contrast  EXAM:   CT Head Without Intravenous Contrast  CLINICAL HISTORY:   f/u ICH. TECHNIQUE:   Axial computed tomography images of the head/brain without intravenous contrast.  CTDI is 33.7 mGy and DLP is 665.74 mGy-cm. All CT scans at this facility use dose modulation, iterative reconstruction, and/or weight based dosing when appropriate to reduce radiation dose to as low as reasonably achievable. COMPARISON:   5/30/2022 at 1910 hrs. FINDINGS:   Brain:  The subarachnoid hemorrhage predominantly involving the right sylvian fissure is questionably minimally improved. The extra-axial hemorrhage in the anterior right vertex is questionably minimally improved. The false seen hemorrhage posteriorly is stable. The intraparenchymal focus involving the subcortical right frontoparietal region at the anterior vertex is stable in size and morphology. No new mass effect. Hypodense extra-axial collections adjacent to the frontal lobes appear similar, measuring up to 7 mm on the right. No significant white matter disease. Ventricles:  No midline shift or developing ventriculomegaly. Bones/joints:  Unremarkable. No acute fracture. Soft tissues:  Unremarkable. Sinuses:  Unremarkable as visualized. No acute sinusitis. Mastoid air cells:  Unremarkable as visualized. No mastoid effusion. Electronically signed by Shellie Cade MD on [phone] at [phone]    1. Questionable minimal improvement in the subarachnoid hemorrhage, as detailed above. 2.  The intraparenchymal focus involving the right high frontoparietal region is stable in appearance and morphology. No new interval intracranial hemorrhage identified. 3.  No significant new mass effect or midline shift.     CT head without contrast    Result Date: 5/30/2022  CT HEAD WO CONTRAST Indication: Intracranial hemorrhage Comparison: 4 hours prior Technique:  CT head was performed without intravenous contrast. Coronal and sagittal reformations were created. Up-to-date CT equipment and radiation dose reduction techniques were employed. Findings: Slight increase in size in the right frontal convexity subdural hematoma becoming more diffused over the frontal lobe with only slight increase in the maximum thickness to 5 mm. The overall density has decreased. Slight increase in density in the left frontal convexity subdural fluid collection with similar thickness of about 8 mm, suggestive of acute on chronic subdural hematoma. Increase in the extent of the hyperdense subarachnoid hemorrhage in the right cerebral hemisphere previously limited to the frontal lobe and with additional progression into the temporal lobe. Foci of hemorrhagic cortical contusion in the high right parasagittal frontal lobe remain stable. No new or worsening mass effect. No midline shift. Gray-white matter differentiation is maintained. Ventricles are stable. Basal cisterns are patent. Calvarium is intact. Paranasal sinuses and mastoid air cells are clear. Decrease in swelling in the region of the right frontal subgaleal hematoma. 1.  Increase in area of the right frontal and temporal subarachnoid hemorrhage. 2.  Slightly increased size of the right frontal convexity acute subdural hematoma. 3.  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 4.  Stable high right frontal lobe hemorrhagic cortical contusions. 5.  No new mass effect or shift.     CT Head WO Contrast    Result Date: 5/30/2022  EXAMINATION: CT OF THE HEAD WITHOUT CONTRAST; CT OF THE CERVICAL SPINE WITHOUT CONTRAST 5/30/2022 12:55 pm TECHNIQUE: CT of the head was performed without the administration of intravenous contrast. Automated exposure control, iterative reconstruction, and/or weight based adjustment of the mA/kV was utilized to reduce the radiation dose to as low as reasonably achievable.; CT of the cervical spine was performed without the administration of intravenous contrast. Multiplanar reformatted images are provided for review. Automated exposure control, iterative reconstruction, and/or weight based adjustment of the mA/kV was utilized to reduce the radiation dose to as low as reasonably achievable. COMPARISON: None. HISTORY: ORDERING SYSTEM PROVIDED HISTORY: fall TECHNOLOGIST PROVIDED HISTORY: Reason for exam:->fall Has a \"code stroke\" or \"stroke alert\" been called? ->No Decision Support Exception - unselect if not a suspected or confirmed emergency medical condition->Emergency Medical Condition (MA) Reason for Exam: fall, hit rt side ; ORDERING SYSTEM PROVIDED HISTORY: fall TECHNOLOGIST PROVIDED HISTORY: Reason for exam:->fall Decision Support Exception - unselect if not a suspected or confirmed emergency medical condition->Emergency Medical Condition (MA) Reason for Exam: fell hit right side of head FINDINGS: BRAIN/VENTRICLES: There is a mild-to-moderate amount of acute subarachnoid hemorrhage along the right frontal sulci, extending inferiorly along the right sylvian fissure into the region of the right temporal lobe. There is also a mild focus of acute intraparenchymal hemorrhage within the superior right frontal lobe. There is also a suspected small right frontal subdural hematoma, measuring approximate 4 mm in maximal transverse thickness. No additional focus of acute intracranial hemorrhage is identified. No mass effect or midline shift is seen. There is a suspected chronic left frontal subdural hygroma, measuring approximately 8 mm in maximal transverse diameter. The ventricles are normal in size and midline in position. There is age-appropriate atrophy. There is mild chronic small vessel ischemic white matter disease.   There is a chronic lacunar infarct within the right cerebellar hemisphere. There is a chronic left thalamic infarct. No additional focus of abnormal brain attenuation is identified. ORBITS: The visualized portion of the orbits demonstrate no acute abnormality. SINUSES: The visualized paranasal sinuses and mastoid air cells demonstrate no acute abnormality. SOFT TISSUES/SKULL:  There is a mild right frontal scalp hematoma, though no acute skull fracture. There is no acute osseous abnormality of the skull base or calvarium. CERVICAL BONES/ALIGNMENT: There is no acute fracture or subluxation. The vertebral bodies are normal in height and alignment. The posterior elements are intact and aligned. No destructive osseous lesion is seen. CERVICAL DEGENERATIVE CHANGES: There is multilevel spondylosis, most notable at C3 through C5. The disc spaces are preserved. There are chronic disc osteophyte protrusions at C4-C5, C5-C6, and C6-C7, all leading to mild central canal stenosis. There is multilevel bilateral uncovertebral and facet hypertrophy leading to varying degrees of multilevel bilateral neuroforaminal stenosis. CERVICAL SOFT TISSUES: The cervical soft tissues are unremarkable. The lung apices are clear. 1. Mild-to-moderate acute right frontal subarachnoid hemorrhage extending inferiorly along the right sylvian fissure into the right temporal region. 2. Small acute right frontal lobe intraparenchymal hemorrhage. 3. Mild right frontal subdural hematoma. 4. No mass effect or midline shift. 5. Chronic left frontal subdural hygroma. 6. No acute fracture or subluxation of the cervical spine. Findings were discussed with NABOR XIAO at 1:32 pm on 5/30/2022.      CT Cervical Spine WO Contrast    Result Date: 5/30/2022  EXAMINATION: CT OF THE HEAD WITHOUT CONTRAST; CT OF THE CERVICAL SPINE WITHOUT CONTRAST 5/30/2022 12:55 pm TECHNIQUE: CT of the head was performed without the administration of intravenous contrast. Automated exposure control, iterative lacunar infarct within the right cerebellar hemisphere. There is a chronic left thalamic infarct. No additional focus of abnormal brain attenuation is identified. ORBITS: The visualized portion of the orbits demonstrate no acute abnormality. SINUSES: The visualized paranasal sinuses and mastoid air cells demonstrate no acute abnormality. SOFT TISSUES/SKULL:  There is a mild right frontal scalp hematoma, though no acute skull fracture. There is no acute osseous abnormality of the skull base or calvarium. CERVICAL BONES/ALIGNMENT: There is no acute fracture or subluxation. The vertebral bodies are normal in height and alignment. The posterior elements are intact and aligned. No destructive osseous lesion is seen. CERVICAL DEGENERATIVE CHANGES: There is multilevel spondylosis, most notable at C3 through C5. The disc spaces are preserved. There are chronic disc osteophyte protrusions at C4-C5, C5-C6, and C6-C7, all leading to mild central canal stenosis. There is multilevel bilateral uncovertebral and facet hypertrophy leading to varying degrees of multilevel bilateral neuroforaminal stenosis. CERVICAL SOFT TISSUES: The cervical soft tissues are unremarkable. The lung apices are clear. 1. Mild-to-moderate acute right frontal subarachnoid hemorrhage extending inferiorly along the right sylvian fissure into the right temporal region. 2. Small acute right frontal lobe intraparenchymal hemorrhage. 3. Mild right frontal subdural hematoma. 4. No mass effect or midline shift. 5. Chronic left frontal subdural hygroma. 6. No acute fracture or subluxation of the cervical spine. Findings were discussed with NABOR XIAO at 1:32 pm on 5/30/2022.      XR CHEST PORTABLE    Result Date: 5/30/2022  EXAMINATION: ONE XRAY VIEW OF THE CHEST 5/30/2022 12:44 pm COMPARISON: 06/15/2015 radiograph HISTORY: ORDERING SYSTEM PROVIDED HISTORY: fall TECHNOLOGIST PROVIDED HISTORY: Reason for exam:->fall FINDINGS: The heart, mediastinum and pulmonary vascularity are normal.  Lungs are well-expanded and clear. No skeletal abnormalities are present in the chest.     No significant findings in the chest.     CT HIP RIGHT WO CONTRAST    Result Date: 5/31/2022  COMPUTED TOMOGRAPHY THE RIGHT HIP Noncontrast INDICATIONS: Trauma, pain Multiplanar imaging CT radiation dose optimization techniques (automated exposure control, use of a iterative reconstruction techniques, or adjustment non- of the mA or KV according to the patients size) were used to limit patient radiation dose. Contrast: FINDINGS: Hypertrophic spurring of the superior inferior margin of the femoral head has junction the femoral neck. Hypertrophic spurring of the superior and inferior acetabular spine Femoral neck: Subchondral cysts of the femoral head and in the acetabulum Anterior column and posterior column are intact. Quadrilateral plate is normal Inferior and superior pubic rami and ischial tuberosity are unremarkable Osteoporosis There is a lucency to the posterior cortex of the greater trochanter on axial series 603 image 128. Subchondral lucency through the anterior margin of the femoral neck on series 603 image 124. Soft tissues: Extensive soft tissue subcutis edema posterior to the greater trochanter with skin thickening. Incidental: Sigmoid diverticulosis. Lower lumbar degenerative vacuum disc disease     1. Nondisplaced fracture the cortex of the greater trochanter and irregularity of the femoral neck with underlying osteoporosis. Occult fracture and contusion are considered. Further evaluation with magnetic resonance imaging is recommended to assess the  degree of marrow edema. 2. Underlying osteoarthritis of the femoral acetabular joint. MRI HIP RIGHT WO CONTRAST    Result Date: 6/1/2022  MRI HIP RIGHT WO CONTRAST Indication: Right hip pain.  Comparison: CT 3/31/22 Technique: Multiplanar multisequence MR imaging of the right hip was performed without intravenous contrast. Findings: There is mild curvilinear edema-like signal within the right greater trochanter. On the basis of the severe osteopenia/osteoporosis and the focal lucency seen on recent CT imaging, this is consistent with an acute nondisplaced fracture. There is no evidence of intertrochanteric extension. No additional fractures. Mild to moderate osteoarthritis in the right hip joint. Prominent ring osteophyte in the femoral head neck junction. No significant joint effusion. Visualized left hip, SI joints and pubic symphysis are congruent. No evidence of acute myotendinous injury. Mild chronic partial tearing in the right gluteus medius insertion. Mild chronic partial tearing in the right hamstring origin. Prominent subcutaneous edema signal lateral to the right hip consistent with contusion. 1.  Nondisplaced fracture in the right greater trochanter. 2.  Edema/contusion in the subcutaneous tissues over the right hip. CTA HEAD NECK W CONTRAST    Result Date: 5/31/2022  EXAMINATION: CTA OF THE HEAD AND NECK WITH CONTRAST 5/30/2022 2:24 pm TECHNIQUE: CTA of the head and neck was performed with the administration of intravenous contrast. Multiplanar reformatted images are provided for review. MIP images are provided for review. Stenosis of the internal carotid arteries measured using NASCET criteria. Automated exposure control, iterative reconstruction, and/or weight based adjustment of the mA/kV was utilized to reduce the radiation dose to as low as reasonably achievable. COMPARISON: None HISTORY: ORDERING SYSTEM PROVIDED HISTORY: fall TECHNOLOGIST PROVIDED HISTORY: Reason for exam:->fall Has a \"code stroke\" or \"stroke alert\" been called? ->No Decision Support Exception - unselect if not a suspected or confirmed emergency medical condition->Emergency Medical Condition (MA) Reason for Exam: fall FINDINGS: CTA NECK: AORTIC ARCH/ARCH VESSELS: No dissection or arterial injury.   No significant stenosis of the summation artifact from a rim osteophyte, but subcapital fracture is difficult to exclude. Hips, SI joints and symphysis are normally aligned. Lucency in the right femoral head neck junction could be artifactual but a subcapital fracture is difficult to exclude. CT recommended. Treatments: As above. Discharge Medications:     Medication List      START taking these medications    levETIRAcetam 500 MG tablet  Commonly known as: Keppra  Take 1 tablet by mouth 2 times daily for 14 days        CONTINUE taking these medications    CALCIUM GUMMIES PO        STOP taking these medications    aspirin 325 mg tablet           Where to Get Your Medications      These medications were sent to [place], [address] [place] [phone] - F [phone]  [address] [facility] [address]    Phone: [phone]   · levETIRAcetam 500 MG tablet         Activity: activity as tolerated  Diet: No diet orders on file      Disposition: ARU  Discharged Condition: Stable  Follow Up: Shellie Costa, [address] [facility]     Schedule an appointment as soon as possible for a visit in 2 weeks  Neurosurgery Follow up - will need follow up head CT prior to appointment     Ed Didi Maradiaga [id number] [address]  [phone]    In 3 weeks      Cinthya Caban MD  [address] [facility]  [phone]    In 1 week          Code status:  Limited         Total time spent on discharge, finalizing medications, referrals and arranging outpatient follow up was more than 30 minutes      Thank you Dr. Cinthya Caban MD for the opportunity to be involved in this patients care.

## 2022-06-06 NOTE — PROGRESS NOTES
Finishing breakfast. Agreeable to working with PT. States she didn't sleep well last night. Pain: c/o mild headache. RN aware and addressing. Pt denies R hip discomfort. Objective:    Exercises  In bed: 10 reps B ankle pumps, quad sets, glut sets, heel slides, SAQ  While reclined in chair: 10 reps B hip abd/add  Other: Cues for quality and pace of exercises    Bed mobility  Supine to sit: Min assist, HOB up partially  Scooting: Mod assist to EOB    Transfers  Sit to stand: Min assist x 1 from bed. Cues for hand placement--pt tends to pull up from walker  Stand to sit: Min assist x 1 into chair. Cues for hand placement. Decreased eccentric control. Ambulation  Assistance Level: CGA to Min assist x 1  Assistive device: Wheeled walker  Distance: 12 ft  Quality of gait: Decreased step length/height; slow; weak; no overt LOB  Other: Pt did not appear to be following PWB R LE consistently despite cues    Vitals  BP after walking to chair: 119/60    Patient Education  Hand placement with transfers when using walker. Needs cues/reminders. PWB R LE with ambulation. Pt expressed understanding, but not following consistently. Safety Devices  Pt left with needs in reach. In chair (reclined) with chair alarm on. RN updated.      AM-PAC score  AM-PAC Inpatient Mobility Raw Score : 16  AM-PAC Inpatient T-Scale Score : 40.78  Mobility Inpatient CMS 0-100% Score: 54.16  Mobility Inpatient CMS G-Code Modifier : CK    Goals: (as determined and assessed by primary PT)  Time Frame for Short term goals: discharge  Short term goal 1: Pt will transfer supine <--> sit with supervision with HOB flat   Short term goal 2: Pt will transfer sit <--> stand with supervision   Short term goal 3: Pt will amb 50 ft with walker and supervision  Short term goal 4: Pt will verbalize good understanding of R LE WB status    Plan:  Plan weeks: 5-7   Current Treatment Recommendations: Functional mobility training,Balance training,Transfer training,Therapeutic activities,Patient/Caregiver education & training,Equipment evaluation, education, & procurement    Therapy Time    Individual  Concurrent  Group  Co-treatment    Time In  932            Time Out  1002            Minutes  30              Timed Code Treatment Minutes: 30  Total Treatment Minutes: 30    Will continue per plan of care. If patient is discharged prior to next treatment, this note will serve as the discharge summary.     Humphrey Hubbard #[id number]

## 2022-06-06 NOTE — PROGRESS NOTES
Progress Note  Physical Medicine and Rehabilitation    Patient: David Mera  [id number]  Date: 6/6/2022      Chief Complaint: fall    Interval history  No acute complaints. Doing well. No acute events overnight. Plan to await insurance precert    History of Present Illness/Hospital Course:  David Mera is an 59-year-old female with HTN, history prior lacunar stroke, HLD.presents to the hospital after a fall at a landrymat. She was found down on the floor. [facility] SAH. She was then transferred to [facility] for neurosurgery consultation. At baseline patient is slightly confused, ambulates with a peña. She also endorses multiple falls. Repeat CT head showed stable bleed. Has been able to participate with PT/OT. Able to tolerate regular diet. Prior Level of Function:  Independent for mobility, ADLs, and IADLs    Current Level of Function:  Mod assist    Pertinent Social History:  Support: Family, step daughter lives close by, niece  Home set-up: Live at a house by herself, does not go upstairs, has bath tub.     Past Medical History:   Diagnosis Date    Allergic rhinitis     GERD (gastroesophageal reflux disease)     Hyperlipidemia     Laceration of head 06/23/2016       Past Surgical History:   Procedure Laterality Date    APPENDECTOMY      COLONOSCOPY  4-2011    FOREARM SURGERY Left 8/10/2020    OPEN REDUCTION INTERNAL FIXATION LEFT DISTAL RADIUS with mini c-arm performed by Dewayne Navas MD at [place] [address]  8/2010    left Ulna    KNEE SURGERY      left torn meniscus       Family History   Problem Relation Age of Onset    Arthritis Mother     Heart Attack Father        Social History     Socioeconomic History    Marital status:      Spouse name: None    Number of children: None    Years of education: None    Highest education level: None   Occupational History    None   Tobacco Use    Smoking status: Never Smoker  Smokeless tobacco: Former User   Vaping Use    Vaping Use: Never used   Substance and Sexual Activity    Alcohol use: No    Drug use: No    Sexual activity: None   Other Topics Concern    None   Social History Narrative    None     Social Determinants of Health     Financial Resource Strain:     Difficulty of Paying Living Expenses: Not on file   Food Insecurity:     Worried About Running Out of Food in the Last Year: Not on file    Lui of Food in the Last Year: Not on file   Transportation Needs:     Lack of Transportation (Medical): Not on file    Lack of Transportation (Non-Medical):  Not on file   Physical Activity:     Days of Exercise per Week: Not on file    Minutes of Exercise per Session: Not on file   Stress:     Feeling of Stress : Not on file   Social Connections:     Frequency of Communication with Friends and Family: Not on file    Frequency of Social Gatherings with Friends and Family: Not on file    Attends Evangelical Services: Not on file    Active Member of [address] [place] [address] or Organizations: Not on file    Attends Club or Organization Meetings: Not on file    Marital Status: Not on file   Intimate Partner Violence:     Fear of Current or Ex-Partner: Not on file    Emotionally Abused: Not on file    Physically Abused: Not on file    Sexually Abused: Not on file   Housing Stability:     Unable to Pay for Housing in the Last Year: Not on file    Number of [place] in the Last Year: Not on file    Unstable Housing in the Last Year: Not on file           REVIEW OF SYSTEMS:   CONSTITUTIONAL: negative for fevers, chills, diaphoresis, appetite change, night sweats, unexpected weight change, fatigue  EYES: negative for blurred vision, eye discharge, visual disturbance and icterus  HEENT: negative for hearing loss, tinnitus, ear drainage, sinus pressure, nasal congestion, epistaxis and snoring  RESPIRATORY: Negative for hemoptysis, cough, sputum production  CARDIOVASCULAR: negative for chest pain, palpitations, exertional chest pressure/discomfort, syncope, edema  GASTROINTESTINAL: negative for nausea, vomiting, diarrhea, blood in stool, abdominal pain, constipation  GENITOURINARY: negative for frequency, dysuria, urinary incontinence, decreased urine volume, and hematuria  HEMATOLOGIC/LYMPHATIC: negative for easy bruising, bleeding and lymphadenopathy  ALLERGIC/IMMUNOLOGIC: negative for recurrent infections, angioedema, anaphylaxis and drug reactions  ENDOCRINE: negative for weight changes and diabetic symptoms including polyuria, polydipsia and polyphagia  MUSCULOSKELETAL: negative for pain, joint swelling, decreased range of motion  NEUROLOGICAL: negative for headaches, slurred speech, unilateral weakness  PSYCHIATRIC/BEHAVIORAL: negative for hallucinations, behavioral problems, confusion and agitation. Physical Examination:  Vitals:   Patient Vitals for the past 24 hrs:   BP Temp Temp src Pulse Resp SpO2   06/06/22 0600 134/68 -- -- 65 17 --   06/06/22 0400 121/64 -- -- 67 21 --   06/06/22 0300 -- -- -- 72 21 --   06/06/22 0200 132/73 -- -- 69 20 --   06/06/22 0100 -- -- -- 70 18 --   06/06/22 0000 139/72 -- -- 70 17 --   06/05/22 2200 112/62 -- -- 79 16 --   06/05/22 2100 -- -- -- 76 22 --   06/05/22 2000 131/69 98 °F (36.7 °C) Oral 79 22 98 %   06/05/22 1900 -- -- -- 80 (!) 9 --   06/05/22 1800 126/67 -- -- 72 24 --   06/05/22 1700 -- -- -- 77 21 --   06/05/22 1600 125/63 97.9 °F (36.6 °C) Oral 78 22 --   06/05/22 1500 -- -- -- 73 16 --   06/05/22 1400 (!) 129/57 -- -- 70 21 --   06/05/22 1300 -- -- -- 72 20 --   06/05/22 1200 (!) 127/51 98.2 °F (36.8 °C) Oral 71 19 --   06/05/22 1103 117/73 -- -- -- -- --   06/05/22 1100 -- -- -- 68 20 --   06/05/22 1000 118/61 -- -- 70 25 --   06/05/22 0900 -- -- -- 66 18 --     Const: Alert and oriented. WDWN. No distress. Eyes: Conjunctiva noninjected, no icterus noted; pupils equal, round, and reactive to light.    HENT: normocephalic; Oral mucosa moist. Abrasion on right forehead. Neck: Trachea midline, neck supple. No thyromegaly noted. CV: Regular rate and rhythm, no murmur rub or gallop noted  Resp: Lungs clear to auscultation bilaterally, no rales wheezes or ronchi, no retractions. Respirations unlabored. GI: Soft, nontender, nondistended. Normal bowel sounds. No palpable masses. Skin: Normal temperature and turgor. No rashes or breakdown noted. Ext: No significant edema appreciated. No varicosities. MSK: No joint tenderness, erythema, warmth noted. AROM intact. Large ecchymosis on right shoulder, large ecchymosis on right hip, tender to touch, appears healing. Neuro: Alert, oriented, appropriate. No cranial nerve deficits appreciated. Sensation intact to light touch. Motor examination reveals normal strength in all four limbs diffusely. No abnormalities with finger/nose or heel/shin noted. Reflexes normal and symmetric. Psych: Stable mood, normal judgement, normal affect     Lab Results   Component Value Date    WBC 6.5 06/06/2022    HGB 12.3 06/06/2022    HCT 36.4 06/06/2022    MCV 95.3 06/06/2022     06/06/2022     Lab Results   Component Value Date    INR 1.03 05/30/2022    PROTIME 13.4 05/30/2022     Lab Results   Component Value Date    CREATININE 1.0 06/06/2022    BUN 29 (H) 06/06/2022     06/06/2022    K 4.1 06/06/2022     06/06/2022    CO2 25 06/06/2022     Lab Results   Component Value Date    ALT 11 05/30/2022    AST 19 05/30/2022    ALKPHOS 43 05/30/2022    BILITOT 0.3 05/30/2022       Most recent echocardiogram revealed:  5/30/2022  Summary   Left ventricular cavity size is normal. Normal left ventricular wall   thickness. Overall left ventricular systolic function appears normal with an   ejection fraction of 55-60 %. No regional wall motion abnormalities are   noted. Normal diastolic function.    Estimated pulmonary artery systolic pressure is at 29 mmHg assuming a right   atrial pressure of 15 mmHg.   IVC size is dilated (>2.1 cm) and collapses < 50% with respiration   consistent with elevated RA pressure (15 mmHg). A bubble study was performed and fails to show evidence of right to left   shunting    Most recent EKG revealed:  Sinus rhythm, no acute changes    Most recent imaging studies revealed:  CT head without contrast 6/1/2022  Impression       Essentially stable multifocal intracranial hemorrhage, subarachnoid, subdural and intraparenchymal.       On my review, CXR displays no acute findings     CT HEAD WO CONTRAST   Final Result      Essentially stable multifocal intracranial hemorrhage, subarachnoid, subdural and intraparenchymal.      MRI HIP RIGHT WO CONTRAST   Final Result      1. Nondisplaced fracture in the right greater trochanter. 2.  Edema/contusion in the subcutaneous tissues over the right hip. CT HIP RIGHT WO CONTRAST   Final Result   1. Nondisplaced fracture the cortex of the greater trochanter and irregularity of the femoral neck with underlying osteoporosis. Occult fracture and contusion are considered. Further evaluation with magnetic resonance imaging is recommended to assess the    degree of marrow edema. 2. Underlying osteoarthritis of the femoral acetabular joint. CT HEAD WO CONTRAST   Final Result      Minimal progression in extra-axial hemorrhage in the right vertex as well as tiny amount of layering intraventricular hemorrhage. Otherwise, intra-axial and extra-axial hemorrhage are without change. XR SHOULDER RIGHT (MIN 2 VIEWS)   Final Result     No acute findings in the right shoulder. CT HEAD WO CONTRAST   Final Result   1. Questionable minimal improvement in the subarachnoid    hemorrhage, as detailed above. 2.  The intraparenchymal focus involving the right high    frontoparietal region is stable in appearance and morphology. No    new interval intracranial hemorrhage identified.    3.  No significant new mass effect or midline shift. XR HIP 2-3 VW W PELVIS RIGHT   Final Result      Lucency in the right femoral head neck junction could be artifactual but a subcapital fracture is difficult to exclude. CT recommended. XR ELBOW RIGHT (MIN 3 VIEWS)   Final Result      No evidence of fracture. CT head without contrast   Final Result      1. Increase in area of the right frontal and temporal subarachnoid hemorrhage. 2.  Slightly increased size of the right frontal convexity acute subdural hematoma. 3.  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 4.  Stable high right frontal lobe hemorrhagic cortical contusions. 5.  No new mass effect or shift. Assessment:  1. Acute debilitation secondary to right hip fracture  - Right femoral neck fracture with conservative treatment  - PT/OT    2. Subarachnoid hemorrhage secondary to fall   - CT head showed   - Repeat CT head showed stable bleed  - Neurosurgery signed off as no intervention indicated  - Will need repeat head CT in 2 weeks   - Keppra 500 mg BID   - SBP goal < 180  - PT/OT   - Pain control    3. Close fracture of the neck of the right femoral  - s/p fall   - Orthopedics recommended conservative treatment   - PT/OT       Impairments-  Decreased functional mobility, Decreased ADLs    Recommendations:  Anticipating admission to ARU today pending Precert (started on 6/2)    Admit to ARU when approved. Patient with new functional deficits and ongoing medical complexity. Demonstrates ability to tolerate 3 hours therapy/day. Patient is a good candidate for acute inpatient rehab when medically appropriate. Thank you for this consult. Please contact me with any questions or concerns.        Kevin Root MD, PGY-3  Internal Medicine  Please contact via [employer]  6/6/2022   8:10 AM    The patient will be staffed and discussed with attending physician, Dr. Jesus Ruffin.         Haroon Morris D.O. M.P.H  PM&R  6/6/2022  8:10 AM

## 2022-06-07 PROCEDURE — 92523 SPEECH SOUND LANG COMPREHEN: CPT

## 2022-06-07 PROCEDURE — 99222 1ST HOSP IP/OBS MODERATE 55: CPT | Performed by: PHYSICAL MEDICINE & REHABILITATION

## 2022-06-07 PROCEDURE — 97116 GAIT TRAINING THERAPY: CPT

## 2022-06-07 PROCEDURE — 97162 PT EVAL MOD COMPLEX 30 MIN: CPT

## 2022-06-07 PROCEDURE — 94150 VITAL CAPACITY TEST: CPT

## 2022-06-07 PROCEDURE — 1280000000 HC REHAB R&B

## 2022-06-07 PROCEDURE — 97530 THERAPEUTIC ACTIVITIES: CPT

## 2022-06-07 PROCEDURE — 97535 SELF CARE MNGMENT TRAINING: CPT

## 2022-06-07 PROCEDURE — 6370000000 HC RX 637 (ALT 250 FOR IP): Performed by: PHYSICAL MEDICINE & REHABILITATION

## 2022-06-07 PROCEDURE — 94761 N-INVAS EAR/PLS OXIMETRY MLT: CPT

## 2022-06-07 PROCEDURE — 97166 OT EVAL MOD COMPLEX 45 MIN: CPT

## 2022-06-07 RX ORDER — MULTIVITAMIN WITH IRON
1 TABLET ORAL DAILY
Status: DISCONTINUED | OUTPATIENT
Start: 2022-06-07 | End: 2022-06-11 | Stop reason: HOSPADM

## 2022-06-07 RX ORDER — PANTOPRAZOLE SODIUM 40 MG/1
40 TABLET, DELAYED RELEASE ORAL
Status: DISCONTINUED | OUTPATIENT
Start: 2022-06-08 | End: 2022-06-11 | Stop reason: HOSPADM

## 2022-06-07 RX ORDER — ACETAMINOPHEN 325 MG/1
650 TABLET ORAL EVERY 6 HOURS
Status: DISCONTINUED | OUTPATIENT
Start: 2022-06-07 | End: 2022-06-11 | Stop reason: HOSPADM

## 2022-06-07 RX ADMIN — THERA TABS 1 TABLET: TAB at 12:18

## 2022-06-07 ASSESSMENT — PAIN DESCRIPTION - PAIN TYPE: TYPE: ACUTE PAIN

## 2022-06-07 ASSESSMENT — PAIN SCALES - GENERAL
PAINLEVEL_OUTOF10: 0

## 2022-06-07 NOTE — PROGRESS NOTES
Call placed to Dr Gwen Ortega, at family request to see if sutures from her head can be removed. No new orders to remove, he states he will look into it tomorrow. Patient and family advised.

## 2022-06-07 NOTE — H&P
 Highest education level: Not on file   Occupational History    Not on file   Tobacco Use    Smoking status: Never Smoker    Smokeless tobacco: Former User   Vaping Use    Vaping Use: Never used   Substance and Sexual Activity    Alcohol use: No    Drug use: No    Sexual activity: Not on file   Other Topics Concern    Not on file   Social History Narrative    Not on file     Social Determinants of Health     Financial Resource Strain:     Difficulty of Paying Living Expenses: Not on file   Food Insecurity:     Worried About Running Out of Food in the Last Year: Not on file    Lui of Food in the Last Year: Not on file   Transportation Needs:     Lack of Transportation (Medical): Not on file    Lack of Transportation (Non-Medical):  Not on file   Physical Activity:     Days of Exercise per Week: Not on file    Minutes of Exercise per Session: Not on file   Stress:     Feeling of Stress : Not on file   Social Connections:     Frequency of Communication with Friends and Family: Not on file    Frequency of Social Gatherings with Friends and Family: Not on file    Attends Adventism Services: Not on file    Active Member of [employer] or Organizations: Not on file    Attends Club or Organization Meetings: Not on file    Marital Status: Not on file   Intimate Partner Violence:     Fear of Current or Ex-Partner: Not on file    Emotionally Abused: Not on file    Physically Abused: Not on file    Sexually Abused: Not on file   Housing Stability:     Unable to Pay for Housing in the Last Year: Not on file    Number of [place] in the Last Year: Not on file    Unstable Housing in the Last Year: Not on file       Allergies   Allergen Reactions    Levofloxacin Nausea Only    Atorvastatin Other (See Comments)         Current Facility-Administered Medications   Medication Dose Route Frequency Provider Last Rate Last Admin    ondansetron (ZOFRAN-ODT) disintegrating tablet 4 mg  4 mg Oral Q8H PRN Hair Rucker,         Or    ondansetron (ZOFRAN) injection 4 mg  4 mg IntraVENous Q6H PRN Hair Rucker, DO        acetaminophen (TYLENOL) tablet 650 mg  650 mg Oral Q4H PRN Hair Rucker, DO        bisacodyl (DULCOLAX) EC tablet 5 mg  5 mg Oral Daily Hair Rucker, DO        magnesium hydroxide (MILK OF MAGNESIA) 400 MG/5ML suspension 30 mL  30 mL Oral Daily PRN Hair Rucker, DO        polyethylene glycol (GLYCOLAX) packet 17 g  17 g Oral Daily PRN Hair Rucker, DO        hydrALAZINE (APRESOLINE) injection 10 mg  10 mg IntraVENous Q6H PRN Hair Rucker, DO             REVIEW OF SYSTEMS:   CONSTITUTIONAL: negative for fevers, chills, diaphoresis, appetite change, night sweats, unexpected weight change, fatigue  EYES: negative for blurred vision, eye discharge, visual disturbance and icterus  HEENT: negative for hearing loss, tinnitus, ear drainage, sinus pressure, nasal congestion, epistaxis and snoring  RESPIRATORY: Negative for hemoptysis, cough, sputum production  CARDIOVASCULAR: negative for chest pain, palpitations, exertional chest pressure/discomfort, syncope, edema  GASTROINTESTINAL: negative for nausea, vomiting, diarrhea, blood in stool, abdominal pain, constipation  GENITOURINARY: negative for frequency, dysuria, urinary incontinence, decreased urine volume, and hematuria  HEMATOLOGIC/LYMPHATIC: negative for easy bruising, bleeding and lymphadenopathy  ALLERGIC/IMMUNOLOGIC: negative for recurrent infections, angioedema, anaphylaxis and drug reactions  ENDOCRINE: negative for weight changes and diabetic symptoms including polyuria, polydipsia and polyphagia  MUSCULOSKELETAL: positive for pain, joint swelling, decreased range of motion in left proximal leg. Pain in right leg. NEUROLOGICAL: negative for headaches, slurred speech, unilateral weakness  PSYCHIATRIC/BEHAVIORAL: negative for hallucinations, behavioral problems, confusion and agitation.    All pertinent positives are noted in the HPI. Physical Examination:  Vitals:   Patient Vitals for the past 24 hrs:   BP Temp Temp src Pulse Resp SpO2 Height Weight   06/06/22 2330 (!) 146/89 98.3 °F (36.8 °C) Oral 74 16 98 % -- --   06/06/22 2329 -- -- -- -- -- -- 5' 8\" (1.727 m) 124 lb 1.9 oz (56.3 kg)       Const: Alert and oriented. WDWN. No distress. Eyes: Conjunctiva noninjected, no icterus noted; pupils equal, round, and reactive to light. HENT: normocephalic; Oral mucosa moist. Abrasion on right forehead. Neck: Trachea midline, neck supple. No thyromegaly noted. CV: Regular rate and rhythm, no murmur rub or gallop noted  Resp: Lungs clear to auscultation bilaterally, no rales wheezes or ronchi, no retractions. Respirations unlabored. GI: Soft, nontender, nondistended. Normal bowel sounds. No palpable masses. Skin: Normal temperature and turgor. No rashes or breakdown noted. Ext: No significant edema appreciated. No varicosities. MSK: No joint tenderness, erythema, warmth noted. AROM intact. Large ecchymosis on right shoulder, large ecchymosis on right hip, tender to touch, appears healing. Neuro: Alert, oriented, appropriate. No cranial nerve deficits appreciated. Sensation intact to light touch. Motor examination reveals normal strength in all four limbs diffusely. No abnormalities with finger/nose or heel/shin noted. Reflexes normal and symmetric.   Psych: Stable mood, normal judgement, normal affect     Lab Results   Component Value Date    WBC 6.5 06/06/2022    HGB 12.3 06/06/2022    HCT 36.4 06/06/2022    MCV 95.3 06/06/2022     06/06/2022     Lab Results   Component Value Date    INR 1.03 05/30/2022    PROTIME 13.4 05/30/2022     Lab Results   Component Value Date    CREATININE 1.0 06/06/2022    BUN 29 (H) 06/06/2022     06/06/2022    K 4.1 06/06/2022     06/06/2022    CO2 25 06/06/2022     Lab Results   Component Value Date    ALT 11 05/30/2022    AST 19 05/30/2022    ALKPHOS 43 05/30/2022    BILITOT 0.3 05/30/2022         No orders to display         The above laboratory data have been reviewed. The above imaging data have been reviewed. The above medical testing have been reviewed. Body mass index is 18.87 kg/m². POST ADMISSION PHYSICIAN EVALUATION  The patient has agreed to being admitted to our comprehensive inpatient rehabilitation facility and can tolerate the intensity of service consisting of at least:  --180 minutes of therapy a day, 5 out of 7 days a week. OR  --15 hours of intensive therapy within a 7 consecutive day period. The patient/family has a good understanding of our discharge process and will benefit from an interdisciplinary inpatient rehabilitation program. The patient has potential to make improvement and is in need of at least two of the following multidisciplinary therapies including but not limited to physical, occupational, respiratory, and speech, nutritional services, wound care, and prosthetics and orthotics. Given the patients complex condition and risk of further medical complications, rehabilitation services cannot be safely provided at a lower level of care such as a skilled nursing facility. All of the goals listed below were reviewed with the patient and he/she is in agreement. I have compared the patients medical and functional status at the time of the preadmission screening and the same on this date, and there are no significant changes. By signing this document, I acknowledge that I have personally performed a full physical examination on this patient within 24 hours of admission to this inpatient rehabilitation facility and have determined the patient to be able to tolerate the above course of treatment at an intensive level for a reasonable period of time.  I will be completing a detailed individualized  Plan of Care for this patient by day four of the patients stay based upon the Preadmission Screen, this Post-Admission Evaluation, and the therapy evaluations. Barriers: Decreased functional mobility, medical comorbidities  Services Required: PT, OT, SLP  Goals: mod I  Prognosis: Good  Anticipated Dispo: home  ELOS: TBD    Rehabilitation Diagnosis:   Orthopedic, 8.11, Unilateral Hip Fracture      Assessment and Plan:  1. Acute debilitation secondary to right hip fracture  - Right femoral neck fracture with conservative treatment  - PT/OT     2. Subarachnoid hemorrhage secondary to fall   - CT head showed   - Repeat CT head showed stable bleed  - Neurosurgery signed off as no intervention indicated  - Will need repeat head CT in 2 weeks   - Keppra 500 mg BID   - SBP goal < 180  - PT/OT   - Pain control     3. Close fracture of the neck of the right femoral  - s/p fall   - Orthopedics recommended conservative treatment   - PT/OT        Impairments: Decreased functional mobility, Decreased ADLs    Bladder - high risk retention - Monitor PVRs, SC prn >300cc    Bowel - high risk constipation - colace BID, PRN miralax and MoM. follow bowel movements. Enema or suppository if needed.      Safety - fall precautions    PPx  DVT: SCD  GI: pantoprazole    Limited Code    Eddi Wilcox D.O. M.P.H  PM&R  6/7/2022  10:21 AM

## 2022-06-07 NOTE — PROGRESS NOTES
NURSING ASSESSMENT: ARU ADMISSION  The [address] [place] [address]     Rehab Dx/Hx: Subarachnoid bleed   :1934  BIO:[id number]  Date of Admit: 2022  Room #: 3103/3103-01    Subjective:   Patient admitted to room@ from floor via bed. Alert and oriented x4. Oriented to room and call light system. Oriented to rehab routine and therapy schedules. Informed about care conferences and ordering of meals. Drug / Medication Review:   Medications were reviewed by RN at time of admission  [x]  No potential or actual clinically significant medication issues were noted.      []   Yes, a clinically significant medication issue was identified                 []  Adverse Drug Event:                  []  Allergy:                  []  Side Effect:                  []  Ineffective Therapy:                  []  Drug Interaction:                 []  Duplicated Therapy:                 []  Untreated Indication:                  []  Non-adherence:                 []  Other:                  Nursing/Pharmacy contacted the physician:     Date:              Time:                  Actions recommended by physician were completed:   Date :            Time:    4 Eyes Skin Assessment   The patient is being assessed for: Admission     I agree that 2 RN's have performed a thorough Head to Toe Skin Assessment on the patient. ALL assessment sites listed below have been assessed. Areas assessed by both nurses:   [x]   Head, Face, and Ears   [x]   Shoulders, Back, and Chest, Abdomen  [x]   Arms, Elbows, and Hands   [x]   Coccyx, Sacrum, and Ischium  [x]   Legs, Feet, and Heel     Does the Patient have Skin Breakdown? No, scattered bruising, scattered abrasions, tears to rt elbow, abrasion with sutures to rt side of forehead.           Reed Prevention initiated:  Yes   Wound Care Orders initiated:  N/A      WOC nurse consulted for Pressure Injury (Stage 3,4, Unstageable, DTI, NWPT, Complex wounds)and New or Established Ostomies:  Not Applicable    Primary Nurse eSignature: Divya Parry RN 6/6/22 at 2319  Co-signer eSignature:    Aguila Schulz RN 6/6/2022 [id number]

## 2022-06-07 NOTE — CARE COORDINATION
Case Management Assessment           Initial Evaluation                Date / Time of Evaluation: 6/7/2022 11:09 AM                 Assessment Completed by: HUNTER Arango, LEANNEW    Patient Name: Violet Seo     YOB: 1934  Diagnosis: Hip fracture, right, closed, initial encounter [facility]) Filipe Chaudhari     Date / Time: 6/6/2022 11:15 PM    Patient Admission Status: Inpatient    If patient is discharged prior to next notation, then this note serves as note for discharge by case management.      Current PCP: Serafin Moncada MD  Clinic Patient: No    Chart Reviewed: Yes  Patient/ Family Interviewed: Yes    Initial assessment completed at bedside with: patient    Hospitalization in the last 30 days: Yes    Emergency Contacts:  Extended Emergency Contact Information  Primary Emergency Contact: [facility] Phone: [phone]  Relation: Child  Secondary Emergency Contact: Huong Bhatt  Address: [address] [place] [address] Phone: [phone]  Relation: Child    Advance Directives:   Code Status: Limited    Healthcare Power of : No    Financial  Payor: Jesika Homes / Plan: Gabriel Riggins / Product Type: *No Product type* /     Pre-cert required for SNF: Yes    Pharmacy    [address] [place] [address] Christin Zhang [address] [facility]  [address] [address] [address]  Phone: [phone] Fax: [phone]    [place]  [id number] [phone] - f [phone]  [address] [place] [address]  Phone: [phone] Fax: [address] [facility] [address]  [facility], [address] [phone] - F [phone]   [address]  [address] [place] [address]  Phone: [phone] Fax: [phone]    [id number] [place] - [address]  [address] [facility] [address]  Phone: [phone] Fax: [phone]      Potential assistance Purchasing Medications: Potential Assistance Purchasing Medications: No  Does Patient want to participate in local refill/ meds to beds program?: Yes    Meds To Beds General Rules:  1. Can ONLY be done Monday- Friday between 8:30am-5pm  2. Prescription(s) must be in pharmacy by 3pm to be filled same day  3. Copy of patient's insurance/ prescription drug card and patient face sheet must be sent along with the prescription(s)  4. Cost of Rx cannot be added to hospital bill. If financial assistance is needed, please contact unit  or ;  or  CANNOT provide pharmacy voucher for patients co-pays  5. Patients can then  the prescription on their way out of the hospital at discharge, or pharmacy can deliver to the bedside if staff is available. (payment due at time of pick-up or delivery - cash, check, or card accepted)     Able to afford home medications/ co-pay costs: Yes    ADLS  Support Systems: Children,Family Members    PT AM-PAC:     OT AM-PAC:       [place]: home alone  Steps:     Plans to RETURN to current housing: Yes  Barriers to RETURNING to current housin [address]  Currently ACTIVE with  [facility] [address]: No    Currently ACTIVE with [facility]: No      Durable Medical Equipment  Equipment: NA    Home Oxygen and [address] [place] prior to admission: No    Dialysis  Active with HD/PD prior to admission: No      DISCHARGE PLAN:  Disposition: Home with [address]: PT/OT     Transportation PLAN for discharge: family     Factors facilitating achievement of predicted outcomes: Family support    Barriers to discharge: Lower extremity weakness    Additional Case Management Notes:  Pt is from home alone, plans to return at [facility] from ARU. Pt was independent PTA, active . Pt is agreeable to [address] at MI.   CM will continue to follow for DC needs and recs.        The Plan for Transition of Care is related to the following treatment goals of Hip fracture, right, closed, initial encounter ([facility] [address] Lisa Perales    The Patient and/or patient representative Roula Crane and her family were provided with a choice of provider and agrees with the discharge plan Yes    Freedom of choice list was provided with basic dialogue that supports the patient's individualized plan of care/goals and shares the quality data associated with the providers.  Yes    Care Transition patient: No    HUNTER Farris, [facility]  Case Management Department  Ph: [phone]   Fax: [phone]

## 2022-06-07 NOTE — PROGRESS NOTES
Physical Therapy  Facility/Department: [facility] ACUTE REHAB UNIT  Rehabilitation Physical Therapy Initial Assessment    NAME: Dylan Copeland  : 1934 (80 y.o.)  MRN: [id number]  CODE STATUS: Limited    Date of Service: 22      Past Medical History:   Diagnosis Date    Allergic rhinitis     GERD (gastroesophageal reflux disease)     Hyperlipidemia     Laceration of head 2016     Past Surgical History:   Procedure Laterality Date    APPENDECTOMY      COLONOSCOPY      FOREARM SURGERY Left 8/10/2020    OPEN REDUCTION INTERNAL FIXATION LEFT DISTAL RADIUS with mini c-arm performed by Luisa Franco MD at [place] [address]  2010    left Ulna    KNEE SURGERY      left torn meniscus       Chart Reviewed: Yes  Additional Pertinent Hx: Admit  s/p fall at [facility]; neurosurg consulted; (+) traumatic SAH; R hip MRI: (+) Nondisplaced fracture in the right greater trochanter; ortho consulted - conservative management; PMHx: GERD, L forearm surgery , L knee surgery, multiple falls per pt report. Admit [facility] 22  General Comment  Comments: Pt supinein bed upon approach and agreeable to PT. Reports needing to use BR- reoriented to use of call light. Restrictions:  Position Activity Restriction  Other position/activity restrictions: PWB 25-50% RLE per RN discussion with ortho, up with assist, Keep SBP<180     SUBJECTIVE  Subjective: Reports HA pain at rest and with mobility. Pt reports pain in L hamstring with mobility. Liana Proctor rated. RN notified.     Prior Level of Function:  Social/Functional History  Lives With: Alone  Type of Home: House  Home Layout: Multi-level,Able to Live on Main level with bedroom/bathroom  Home Access: Ramped entrance  Bathroom Shower/Tub: Walk-in shower  Bathroom Toilet: Handicap height  Bathroom Equipment: Grab bars in shower,Shower chair,Grab bars around toilet  Home Equipment: [employer]on  Has the patient had two or more falls in the past year or any fall with injury in the past year?: Yes  ADL Assistance: Independent  Homemaking Assistance: Needs assistance (Reports niece helps with laundry and groceries and daughter in law helps with cleaning. Reports son not in good health (was in hospital recently) so she tries not to ask him for much)  Ambulation Assistance: Needs assistance (thinks she was using cane, unsure)  Transfer Assistance: Independent  Occupation: Retired  Additional Comments: Per prevous eval pt inconsistant information/ historian with varying levels of assist / helpers  with PLOF / house setup. All information consistant with previous eval this date other than provides more specific answers on who comes to house to help her. OBJECTIVE  Vision  Vision: Impaired  Vision Exceptions: Wears glasses at all times (glasses not present)    Hearing  Hearing: Within functional limits  Hearing Exceptions: Hard of hearing/hearing concerns    Cognition  Overall Cognitive Status: Exceptions  Arousal/Alertness: Delayed responses to stimuli  Following Commands: Follows one step commands with repetition; Follows one step commands with increased time  Attention Span: Difficulty attending to directions  Memory: Decreased short term memory;Decreased recall of recent events;Decreased recall of precautions  Safety Judgement: Decreased awareness of need for assistance;Decreased awareness of need for safety  Problem Solving: Assistance required to generate solutions;Assistance required to identify errors made  Insights: Decreased awareness of deficits  Initiation: Requires cues for all  Sequencing: Requires cues for some  Cognition Comment: Pt demo delay with processing.  Also some difficulty w/ maintaining thought process, and word finding    ROM  AROM RLE (degrees)  RLE AROM: WFL  AROM LLE (degrees)  LLE AROM : WFL    Strength  Strength RLE  Comment: 4/5 hip flexion, 5/5 qaud extension/DF/PF  Strength LLE  Comment: 4/5 globally seated MMTs, reports L LE weaker than R LE at baseline    Sensation  Overall Sensation Status: WFL (reports N/T)    Functional Mobility  Bed mobility  Supine to Sit: Minimal assistance (HOB slightly elevated)  Scooting: Contact guard assistance (seated EOB)  Bed Mobility Comments: slow and effortful, ,limited by head ache pain  Transfers  Sit to Stand: Minimal Assistance; Moderate Assistance (fluctuates fluctuates min<>mod at EOB/wc, min A at raised toilet seat ; all with RW)  Stand to sit: Contact guard assistance;Minimal Assistance (completed at [facility]. min A d/t decreased eccentric control, CGA with increased VC for eccentric control)  Stand Pivot Transfers: Minimal Assistance; Moderate Assistance (min A EOB>wcc, mod A wc>recliner; all with RW)  Comment: VC for hand placement and increased eccentric control  Balance  Sitting - Static: Good;- (supervision seated EOB/ on commode)  Sitting - Dynamic: Fair;+ (SBA seated EOB/on commode)  Standing - Static: Fair (CGA without UE supoprt on RW)  Standing - Dynamic: Fair;- (CGA for amb with RW, CGA for brief management/handwashing without UE support)    Environmental Mobility  Ambulation  Device: Rolling Walker  Assistance: Contact guard assistance;Minimal assistance (CGA with occasional min A required for RW placement)  Quality of Gait: slow, effortful, flexed trunk, decreased step height/length B LE, shuffling steps, step to pattern with increased step length  Distance: 15' x2, small distances in bathroom  Comments: VC for WBing status, increased step length, and safe RW placement  Stairs/Curb  Stairs?: No         ASSESSMENT       Activity Tolerance  Activity Tolerance: Patient tolerated evaluation without incident  Activity Tolerance Comments: Pt requires increased time and effort for all mobility. Limited by HA pain and pain in L hamsting. Assessment  Assessment: Pt admits to [facility] s/p SAH and R hip fracture with above impairments.  Requires CGA-mod for transfers and ambulation with RW with increased time and  effort required for all mobility. Pt would benefit from continued skilled PT to progress towards PLOF and independence in which she lived at home independently. Treatment Diagnosis: impaired gait and transfers  Therapy Prognosis: Good  Decision Making: Medium Complexity  PT D/C Equipment  Walker: Rolling  PT Equipment Recommendations  Equipment Needed: Yes  Mobility Devices: Cookie Fennel: Rolling    CLINICAL IMPRESSION   Pt admits to [facility] s/p SAH and R hip fracture with above impairments. Requires CGA-mod for transfers and ambulation with RW with increased time and  effort required for all mobility. Pt would benefit from continued skilled PT to progress towards PLOF and independence in which she lived at home independently. GOALS  Patient Goals   Patient goals : to go home  Long Term Goals  Time Frame for Long term goals : 2 weeks  Long term goal 1: Pt will complete bed mobility independently  Long term goal 2: Pt will complete sit<>stand transfers with independence  Long term goal 3: Pt will complete 150' with mod I and LRAD  Long term goal 4: Pt will complete ascent/descent of 10' ramp with mod I and LRAD  Long term goal 5: Pt will complete curb step with LRAD and mod I    PLAN OF CARE  Frequency: 1-2 treatment sessions per day, 5-7 days per week  Plan  Plan:  (60 min, 5 x a week)  Current Treatment Recommendations: Functional mobility training;Balance training;Transfer training; Therapeutic activities; Patient/Caregiver education & training;Equipment evaluation, education, & procurement;Gait training  Safety Devices  Type of Devices: Call light within reach;Nurse notified; Chair alarm in place; Left in chair    EDUCATION  Education  Education Given To: Patient  Education Provided: Role of Therapy;Plan of Care;Precautions; Safety; Mobility Training;Transfer Training;Equipment  Education Provided Comments: Pt able to recall that she is supposed to be putting decreaased weight through R LE however requires repeated VC for precautions throughout session.   Education Method: Demonstration;Verbal  Barriers to Learning: Cognition  Education Outcome: Verbalized understanding;Continued education needed      Therapy Time   Individual Concurrent Group Co-treatment   Time In  0730         Time Out  0830         Minutes  60               Time Coded Tx Minutes: Nato Sung PT, [place] [id number]

## 2022-06-07 NOTE — CONSULTS
Comprehensive Nutrition Assessment    RECOMMENDATIONS:  1. PO Diet: Continue regular diet  2. ONS: Start high calorie, high protein supplement BID & Magic Cup QD    NUTRITION ASSESSMENT:   Nutritional summary & status: Nutrition eval for new ARU pt admit. Pt busy with other healthcare staff upon attempted 2 visits. Noted pt is slightly confused at baseline per chart review. Pt was seen by this team during inpatient adm, has variable PO intakes. Noted pt also has had 10lb wt loss within a week. Will send ONS to help promote better nutrient intakes. Encourage pt to consume adequate PO intake. Will continue to monitor nutritional staus throughout adm.      Admission/PMH: Admit for hip fracture; PMH: HTN, history prior lacunar stroke, HLD, GERD    MALNUTRITION ASSESSMENT  Context of Malnutrition: Acute Illness   Malnutrition Status: Mild malnutrition  Findings of the 6 clinical characteristics of malnutrition (Minimum of 2 out of 6 clinical characteristics is required to make the diagnosis of moderate or severe Protein Calorie Malnutrition based on AND/ASPEN Guidelines):  Energy Intake: Less than/equal to 75% of estimated energy requirements    Energy Intake Time: Greater than or equal to 5 days    Weight Loss %: 7.5% loss or greater    Weight loss Time: Greater than or equal to 7 days      NUTRITION DIAGNOSIS   Underweight related to inadequate protein-energy intake as evidenced by BMI,intake 26-50%,intake 51-75%    NUTRITION INTERVENTION  Food and/or Nutrient Delivery:  Continue Current Diet,Start Oral Nutrition Supplement  Nutrition Education/Counseling:  No recommendation at this time   Goals:  pt will tolerate >50% of all meals and ONS throughout adm          Nutrition Monitoring and Evaluation:   Food/Nutrient Intake Outcomes:  Food and Nutrient Intake,Supplement Intake  Physical Signs/Symptoms Outcomes:  Biochemical Data,Weight     OBJECTIVE DATA: Significant to nutrition assessment  · Nutrition-Focused Physical Findings: lbm 6/2, no edema noted  · Labs: Reviewed;   · Meds: Reviewed; MVI, dulcolax, protonix  · Wounds: None       CURRENT NUTRITION THERAPIES  ADULT DIET; Regular  ADULT ORAL NUTRITION SUPPLEMENT; Breakfast, Dinner; Standard High Calorie/High Protein Oral Supplement  ADULT ORAL NUTRITION SUPPLEMENT; Lunch; Frozen Oral Supplement     PO Intake: 51-75%,26-50%   PO Supplement Intake:Unable to assess  Additional Sources of Calories/IVF:n/a     ANTHROPOMETRICS  Current Height: 5' 8\" (172.7 cm)  Current Weight: 124 lb 1.9 oz (56.3 kg)    Admission weight: 124 lb 1.9 oz (56.3 kg)  Ideal Body Weight (IBW): 140 lbs  (64 kg)    Usual Bodyweight  (134-135lb per EMR)   Weight Changes: -10lb x 1 week (7.5% wt loss)      BMI: 18.9    Wt Readings from Last 50 Encounters:   06/06/22 124 lb 1.9 oz (56.3 kg)   06/02/22 124 lb 1.9 oz (56.3 kg)   05/30/22 134 lb 7.7 oz (61 kg)   02/24/21 135 lb (61.2 kg)     COMPARATIVE STANDARDS  Energy (kcal):  0718-4724 (30-35)     Protein (g):  73-84 (1.3-1.5)       Fluid (ml/day):  3719-4046    The patient will still be monitored per nutrition standards of care. Consult dietitian if nutrition interventions essential to patient care is needed.      Umesh Barber [address] [place] [address]  Danis:  [phone]  Office:  [phone]

## 2022-06-07 NOTE — PLAN OF CARE
Problem: Skin/Tissue Integrity  Goal: Absence of new skin breakdown  Description: 1. Monitor for areas of redness and/or skin breakdown  2. Assess vascular access sites hourly  3. Every 4-6 hours minimum:  Change oxygen saturation probe site  4. Every 4-6 hours:  If on nasal continuous positive airway pressure, respiratory therapy assess nares and determine need for appliance change or resting period. Outcome: Progressing  Note: See Reed scale. Encourage/assist pt to turn and reposition every two hours and as needed. Heels elevated off bed. Protective barrier placed as needed. Patient kept clean and dry. Pillows used for positioning. Will continue to monitor for skin breakdown. Problem: Safety - Adult  Goal: Free from fall injury  Outcome: Progressing  Note: Client remains free from falls, bed/chair alarm in place, door open, encouraged to use call light for needs, call light is within reach, bed locked in lowest position,  Will continue to monitor. Problem: Pain  Goal: Verbalizes/displays adequate comfort level or baseline comfort level  Outcome: Progressing  Note: Pt voices pain needs appropriately, pain is assessed during shift.

## 2022-06-07 NOTE — PLAN OF CARE
assistive devices   [x] medication education   [] O2 saturation management   [x] energy conservation   [x] stress management techniques   [x] fall prevention   [x] alarms protocol   [x] seating and positioning   [] skin/wound care   [x] pressure relief instruction   [] dressing changes     [] infection protection   [x] DVT prophylaxis  [x] assistance with in room safety with transfers to bed, toilet, wheelchair, shower   [x] bathroom activities and hygiene  [] Other:    Patient/Caregiver Education for:  [x] Disease/sustained injury/management     [x] Medication Use  [] Surgical intervention  [x] Safety  [x] Body mechanics and or joint protection  [x] Health maintenance     [] Other:     PHYSICAL THERAPY:  Goals:                               Long Term Goals  Time Frame for Long term goals : 2 weeks  Long term goal 1: Pt will complete bed mobility independently  Long term goal 2: Pt will complete sit<>stand transfers with independence  Long term goal 3: Pt will complete 150' with mod I and LRAD  Long term goal 4: Pt will complete ascent/descent of 10' ramp with mod I and LRAD  Long term goal 5: Pt will complete curb step with LRAD and mod I  These goals were reviewed with this patient at the time of assessment and Gabriel Graham is in agreement.      Plan of Care: Pt to be seen 5 out of 7 days per week per ARU protocol (60 minutes with PT)                  Current Treatment Recommendations: Functional mobility training,Balance training,Transfer training,Therapeutic activities,Patient/Caregiver education & training,Equipment evaluation, education, & procurement,Gait training    OCCUPATIONAL THERAPY:  Goals:               :  Long Term Goals  Time Frame for Long term goals : 2 weeks  Long Term Goal 1: Pt will complete toileting and toilet transfers MOD I  Long Term Goal 2: Pt will complete LE dressing MOD I w/ use of AE prn  Long Term Goal 3: Pt will complete bathing tasks w/ spvn  Long Term Goal 4: Pt will complete tub transfer w/ spvn  Long Term Goal 5: Pt will verbalize 3 fall prevention strategies to increase safety w/ ADLs at home :  These goals were reviewed with this patient at the time of assessment and Elodia Smith is in agreement    Plan of Care:  Pt to be seen 5 out of 7 days per week per ARU protocol (60 minutes with OT)       SPEECH THERAPY: Goals will be left blank if speech is not following this patient. Goals:                    Short-term Goals  Goal 1: Pt will complete alternating attention tasks with 80% accuracy given min cues. Goal 2: Pt will complete graded naming tasks with 80% accuracy given min cues. Goal 3: Pt will complete graded memory tasks with 80% accuracy given mod cues. Goal 4: Pt will complete graded tasks with adequate self-monitoring and self-correcting with 80% accuracy indepenently. Goal 5: Pt will participate in ongoing cognitive-linguistic assessment. Plan of Care:  Pt to be seen 5 out of 7 days per week per ARU protocol (60 minutes with SLP)    Therapy Treatments will include:  [x]  therapeutic exercises    [x]  gait training     [x]  neuromuscular re-ed                            [x]  transfer training             [] community reintegration    [x] bed mobility                          []  w/c mobility and training  [x]  self care    [x]home mgmt    [x]  cognitive training            [x]  energy conservation        []  dysphagia tx    []  speech/language/communication therapy   []  group therapy    [x]  patient/family education    [] Other:    CASE MANAGEMENT:  Goals: Assist patient/family with discharge planning, patient/family counseling, and coordination with insurance during ARU stay.     Admission Period/Goal QM SCORES  QM Admit/Goal Score   Eating CARE Score: 5 / Discharge Goal: Independent   Oral Hygiene CARE Score: 5 / Discharge Goal: Independent   Shower/Bathing CARE Score: 4 / Discharge Goal: Independent   UB Dressing CARE Score: 5 / Discharge Goal: Independent   LB Dressing CARE Score: 1 / Discharge Goal: Independent   Putting on/off Footwear CARE Score: 1 / Discharge Goal: Independent   Toileting Hygiene CARE Score: 3 / Discharge Goal: Independent   Bladder Continence Bladder Continence: Stress incontinence only    Bowel Continence Bowel Continence: Always continent    Toilet Transfers CARE Score: 3 / Discharge Goal: Independent   Shower/Bathe Self  CARE Score: 4 / Discharge Goal: Independent   Rolling Left and Right CARE Score: 4 / Discharge Goal: Independent   Sit to Lying CARE Score: 3 / Discharge Goal: Independent   Lying to Sitting on Bedside CARE Score: 3 / Discharge Goal: Independent   Sit to Stand CARE Score: 3 / Discharge Goal: Independent   Chair/Bed to Chair Transfer CARE Score: 1 / Discharge Goal: Independent   Car Transfers CARE Score: 1 / Discharge Goal: Independent   Walk 10 Feet CARE Score: 1 / Discharge Goal: Independent   Walk 50 Feet with Two Turns CARE Score: 88 / Discharge Goal: Independent   Walk 150 Feet CARE Score: 88 / Discharge Goal: Independent   Walk 10 Feet on Uneven Surfaces CARE Score: 88 / Discharge Goal: Independent   1 Step (Curb) CARE Score: 88 / Discharge Goal: Independent   4 Steps CARE Score: 88 / Discharge Goal: Not Applicable   12 Steps CARE Score: 88 / Discharge Goal: Not Applicable   Picking up Object from Floor CARE Score: 88 / Discharge Goal: Independent   Wheel 50 Feet with 2 Turns   /     Type         [] Manual        [] Motorized        [x] N/A   Wheel 150 Feet   /     Type         [] Manual        [] Motorized        [x] N/A        Rama Lott will be seen a minimum of 3 hours of therapy per day, a minimum of 5 out of 7 days per week (please see above for specific treatment plan per PT/OT/SLP). [] In this rare instance due to the nature of this patient's medical involvement, this patient will be seen 15 hours per week (900 minutes within a 7day period).     In addition, dietician/nutritionist may monitor M.P.H  PM&R  6/8/2022  10:31 AM        Case Mgmt: Jacinto Babatundetucker, LISW 6/7/22 at 11:12am    OT: Vi Limon, OTR/L  6/7/2022 1309     PT: Acey Primrose PT, DPT [id number] @ [phone] 6/7/2022    RN: Imtiaz Garrett RN 6/7/22 at [address]: Emeli Huynh MA, CF-SLP 6/7/22 1544      ARU Supervisor: Davy Paz OTR/L 6/8/2022 @ 8907    Other:

## 2022-06-07 NOTE — PLAN OF CARE
Problem: Discharge Planning  Goal: Discharge to home or other facility with appropriate resources  Recent Flowsheet Documentation  Taken 6/6/2022 2330 by Kiarra Brandt RN  Discharge to home or other facility with appropriate resources: Identify barriers to discharge with patient and caregiver  Taken 6/6/2022 2326 by Gentry Donald RN  Discharge to home or other facility with appropriate resources:   Identify discharge learning needs (meds, wound care, etc)   Identify barriers to discharge with patient and caregiver     Problem: Skin/Tissue Integrity  Goal: Absence of new skin breakdown  Description: 1. Monitor for areas of redness and/or skin breakdown  2. Assess vascular access sites hourly  3. Every 4-6 hours minimum:  Change oxygen saturation probe site  4. Every 4-6 hours:  If on nasal continuous positive airway pressure, respiratory therapy assess nares and determine need for appliance change or resting period.   Outcome: Progressing  Note: No evidence of new skin breakdown     Problem: Safety - Adult  Goal: Free from fall injury  Outcome: Progressing  Flowsheets (Taken 6/6/2022 2330)  Free From Fall Injury: Instruct family/caregiver on patient safety     Problem: Pain  Goal: Verbalizes/displays adequate comfort level or baseline comfort level  Outcome: Progressing  Flowsheets (Taken 6/7/2022 0132)  Verbalizes/displays adequate comfort level or baseline comfort level: Encourage patient to monitor pain and request assistance

## 2022-06-07 NOTE — PROGRESS NOTES
Speech Language Pathology  Facility/Department: [facility] ACUTE REHAB UNIT  Initial Speech/Language/Cognitive Assessment    NAME: James Leonardo  : 1934   MRN: [id number]  ADMISSION DATE: 2022  ADMITTING DIAGNOSIS: has Hyperlipidemia; GERD (gastroesophageal reflux disease); Closed fracture of left distal radius; Other secondary hypertension; Subarachnoid bleed ([address] Closed fracture of neck of right femur ([address] Intracranial bleed ([address] and Hip fracture, right, closed, initial encounter [facility]) on their problem list.  DATE ONSET: 22    Date of Eval: 2022   Evaluating Therapist: BRENT Krishnamurthy    RECENT RESULTS  CT OF HEAD:  22  Essentially stable multifocal intracranial hemorrhage, subarachnoid, subdural and intraparenchymal.    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 Mild to moderate perseverations noted during evaluation tasks. Mild to resolving aphasia per chart review of inpatient SLP documentation and pt had severe impairment with divergent naming task, however, completed convergent naming task with 100% accuracy independently. Pt was also functional during conversation and throughout rest of evaluation. Mild dysarthria with reduced vocal intensity and mild imprecision with articulation, but intelligibility was minimally affected.     Recommendations:  Requires SLP Intervention: Yes     D/C Recommendations: Ongoing speech therapy is recommended during this hospitalization       Plan:   Goals:  Short-term Goals  Goal 1: Pt will complete alternating attention tasks with 80% accuracy given min cues. Goal 2: Pt will complete graded naming tasks with 80% accuracy given min cues. Goal 3: Pt will complete graded memory tasks with 80% accuracy given mod cues. Goal 4: Pt will complete graded tasks with adequate self-monitoring and self-correcting with 80% accuracy indepenently. Goal 5: Pt will participate in ongoing cognitive-linguistic assessment. Patient/family involved in developing goals and treatment plan: pt involved    Subjective:   Previous level of function and limitations:  General  Chart Reviewed: Yes  Patient assessed for rehabilitation services?: Yes  Family / Caregiver Present: No  General Comment  Comments: Pt presented to [facility] ED on 5/30 secondary to a fall to the head. CT findings showed a subarachnoid hemmorrhage. Pt was transferred to [facility] for a neurology consult and was admitted for R hip fracture and SAH. While admitted, pt received SLP services for cognitive-linguistic impairment, mild aphasia, and dysphagia. Pt was dismissed from SLP services for dysphagia on 6/2. Pt has hx of HTN, lacunar stroke in cerebellum, basal ganglia, and L thalamus in 2019, and baseline confusion. MRI reports from 2019 also stated that findings are consistent with dementia. Per chart review, pt has also reported several falls in the last year or so. Chart review also noted that hx provided by pt may not be reliable. Subjective  Subjective: Pt resting in chair upon entry with meal tray in front of her. Easily roused and agreeable to evaluation.   Social/Functional History  Lives With: Alone  Type of Home: House  Homemaking Responsibilities: Yes  Bill Paying/Finance Responsibility: Primary  Health Care Management: Primary  Active : Yes  Mode of Transportation: Car  Education: graduated [employer] ([place]  Occupation: Retired  Type of Occupation: worked for [employer] for 40 years- couldn't recall role, but stated \"if they needed help with anything, I helped. I did anything to help them. \"  Leisure & Hobbies: spend time with 2 kids (son and a close family friend) and grandchildren- take them somewhere or at home; watch TV  Vision  Vision: Impaired  Vision Exceptions: Wears glasses at all times (glasses not at hospital)  Hearing  Hearing: Within functional limits  Hearing Exceptions: Hard of hearing/hearing concerns           Objective: Auditory Comprehension  Basic Questions: WFL  Complex Questions: WFL  One Step Commands:  (pt able to follow evlauation task instructions)  Two Step Commands:  (pt able to follow evaluation task directions)  Conversation: WFL    Reading Comprehension  Reading Status:  (did not assess)    Expression  Primary Mode of Expression: Verbal    Verbal Expression  Verbal Expression: Exceptions to functional limits  Initiation: WFL  Repetition: WFL  Automatic Speech: WFL  Confrontation: WFL  Convergent: WFL  Divergent: Moderate (pt stated, \"my mind went blank. \" during divergent naming tasks) pt commented, \"I don't know what animals are. \" during task. Responsive: WFL  Conversation: WFL  Interfering Components: Attention; Impaired thought organization;Perseverations  Effective Techniques: Overarticulate;Decreased rate    Written Expression  Dominant Hand: Right  Written Expression:  (did not assess)         Pragmatics/Social Functioning  Pragmatics: Within functional limits    Cognition:      Orientation  Overall Orientation Status: Impaired  Orientation Level: Oriented to person;Oriented to situation (required mod cues for orientation to place and time)  Attention  Attention: Exceptions to [facility]  Alternating Attention: Severe  Sustained Attention: Mild; external stimuli present during evaluation (physician entered room, PCA's entering room), but pt able to continue task where left off. Memory  Memory: Exceptions to [facility]  Long-term Memory:  Moderate  Short-term Memory: Mild  Working Memory: Mild  Problem Solving  Problem Solving: Exceptions to [facility]  Verbal Reasoning Skills: Mild  Initiation: [facility]  Executive Function Skills: Moderate; pt with x3 instances of self-monitoring and attempted to self-correct, however, self-corrections were not accurate. Pt also attempted to utilize external tools (clock in room) to complete evaluation tasks. Pt also made comments throughout evaluation indicating that she was aware of errors made. Required increased time to complete evaluation tasks. Unaware of perseverations or incomplete tasks. Managing Finances:  (needs further assessment)  Managing Medications:  (needs futher assessment)  Safety/Judgment  Safety/Judgment: Exceptions to [facility]  Routine Tasks: Moderate  Insight: Moderate    Impulsive: Mild  Flexibility of Thought: Moderate    Additional Assessments:  Pt was administered the Cognitive Linguistic Quick Test (CLQT) with the following results:    Attention- severe  Memory- mild  Executive Functions- moderate  Language- moderate  Visuospatial Skills- moderate  Composite Severity Rating- moderate  Clock Drawing Severity Rating- moderate            Prognosis:  Speech Therapy Prognosis  Prognosis: Fair  Prognosis Considerations: Age;Medical Diagnosis; Medical Prognosis; pt's baseline  Individuals consulted  Consulted and agree with results and recommendations: Patient    Education:  Patient Education: Pt educated to role of SLP, reason for evaluation, and results/recommendations. Patient Education Response: Verbalizes understanding  Safety Devices in place: Yes  Type of devices: All fall risk precautions in place;Call light within reach; Chair alarm in place    Therapy Time:   Individual Concurrent Group Co-treatment   Time In 0930         Time Out 1030         Minutes 60            Total Treatment Time: [address], [facility], -SLP  [place]  Speech-Language Pathologist

## 2022-06-07 NOTE — PROGRESS NOTES
Occupational Therapy  Facility/Department: [facility] [facility]UTE REHAB UNIT  Occupational Therapy Initial Assessment/Treatment     Name: Luke Sarah  : 1934  MRN: [id number]  Date of Service: 2022    Discharge Recommendations:  24 hour supervision or assist,Home with Home health OT  OT Equipment Recommendations  Other: Pt reported she has a TTB, RTS, and grab bars however pt is an unreliable historian. Cont to assess for additional needs       Patient Diagnosis(es): There were no encounter diagnoses. Past Medical History:  has a past medical history of Allergic rhinitis, GERD (gastroesophageal reflux disease), Hyperlipidemia, and Laceration of head. Past Surgical History:  has a past surgical history that includes knee surgery; Appendectomy; fracture surgery (2010); Colonoscopy (); and Forearm surgery (Left, 8/10/2020). Treatment Diagnosis: impaired ADLs  and functional transfers 2/2 SAH and femoral neck fracture      Assessment   Performance deficits / Impairments: Decreased functional mobility ; Decreased ADL status; Decreased safe awareness;Decreased cognition;Decreased endurance;Decreased strength;Decreased balance  Assessment: Pt is an 81 yo female who presents to [facility] [address] 2/2 fall resulting in R femoral neck fracture and SAH. Prior to admission pt reported she was independent w/ all ADLs and she was using a cane. Pt is now limited by impaired balance, impaired cognition, decreased activity tolerance, and decreased strength and pt is functioning well below her baseline as a result. Pt benefits from OT in order to maximize functional independence.   Treatment Diagnosis: impaired ADLs  and functional transfers 2/2 SAH and femoral neck fracture  Prognosis: Good  Decision Making: Medium Complexity    Activity Tolerance  Activity Tolerance: Patient Tolerated treatment well;Patient limited by fatigue  Activity Tolerance Comments: Pt w/ increased fatigue after shower        Plan   Plan  Times per Week: 5x a week for 60 mins daily  Times per Day: Daily  Current Treatment Recommendations: Functional mobility training,Endurance training,Safety education & training,Patient/Caregiver education & training,Equipment evaluation, education, & procurement,Self-Care / ADL,Cognitive reorientation,Strengthening,Neuromuscular re-education,Pain management,Home management training     Restrictions  Position Activity Restriction  Other position/activity restrictions: PWB 25-50% RLE per RN discussion with ortho, up with assist, Keep SBP<180, HOB >30 degrees    Subjective   General  Chart Reviewed: Yes  Patient assessed for rehabilitation services?: Yes  Additional Pertinent Hx: Admit 5/30 from [facility] with SAH,fall/ found down at [facility],  ortho consult + trochan. fx R LE -- no sx - protected WB 25-50%, Neuro sx consult , CT head -Mild-to-moderate acute right frontal subarachnoid hemorrhage extending inferiorly along the right sylvian fissure, R SDH,    Multiple xray - neg fx ? R femoral lucancy   PMHX: HTN, history CVA? (lacunar stroke), HLD, baseline confusion.    Admitted to ARU 6/6  Family / Caregiver Present: No  Referring Practitioner: Dr. Arian Harper DO  Diagnosis: SAH, SDH, R femur fx  Subjective  Subjective: Pt was seated in recliner chair upon arrival. Pt was pleasant and agreeable to OT evaluation       Social/Functional History  Social/Functional History  Lives With: Alone  Type of Home: House  Home Layout: Multi-level,Able to Live on Main level with bedroom/bathroom  Home Access: Ramped entrance  Bathroom Shower/Tub: Tub/Shower unit  Bathroom Toilet: Standard  Bathroom Equipment: Grab bars in shower,Grab bars around toilet,Tub transfer [address]: Cane,Rollator,Walker, rolling  Has the patient had two or more falls in the past year or any fall with injury in the past year?: Yes  ADL Assistance: [facility]: Needs assistance (Reports niece helps with laundry and groceries and daughter in law helps with cleaning. Reports son not in good health (was in hospital recently) so she tries not to ask him for much)  Homemaking Responsibilities: Yes  Bill Paying/Finance Responsibility: Primary (writes checks)  Health Care Management: Primary (stated she doesn't take medication besides aspirin and vitamins; does not use a pill organizer)  Ambulation Assistance: Needs assistance (thinks she was using cane, unsure)  Transfer Assistance: Independent  Active : Yes  Mode of Transportation: Car  Education: graduated [employer]  Occupation: Retired  Type of Occupation: worked for [employer] for 40 years- couldn't recall role, but stated \"if they needed help with anything, I helped. I did anything to help them. \"  Leisure & Hobbies: spend time with 2 kids (son and a close family friend) and grandchildren- take them somewhere or at home; watch TV  Additional Comments: Per previous eval w/ PT pt provided conflicting information. Pt appears to be a poor historian. Objective   Heart Rate: 78  Heart Rate Source: Monitor  BP: 131/66  BP Location: Left upper arm  BP Method: Automatic  MAP (Calculated): 87.67  Resp: 18  SpO2: 97 %    Vision Exceptions: Wears glasses at all times  Hearing: Within functional limits  Hearing Exceptions: Hard of hearing/hearing concerns         Observation/Palpation  Observation: Large bruise R lateral aspect of R hip. IV RUE. Safety Devices  Type of Devices: Call light within reach;Nurse notified; Chair alarm in place; Left in chair    Transfer Training  Sit to Stand: Minimum assistance; Moderate assistance (min-mod A from recliner chair, toilet, TTB)  Stand to Sit: Contact-guard assistance (VCs needed for controlled descent)  Toilet Transfer: Minimum assistance (to/from RTS. VCs needed for controlled descent)  Gait  Overall Level of Assistance: Minimum assistance  Interventions: Verbal cues; Weight shifting training/pressure relief;Manual cues; Tactile cues  Speed/[place]: Shuffled  Step Length: Left shortened;Right shortened  Assistive Device: Gait belt;Walker, rolling (Pt ambulated to/from the bathroom w/ RW and CGA-Min A but required ++ time due to slow pace. Pt w/ decreased weight through BUES and required + cues for WB precautions)    Toilet Transfers  Toilet - Technique: Ambulating  Equipment Used: Raised toilet seat with rails  Toilet Transfer: Minimal assistance  Shower Transfers  Shower - Transfer From: Criss Acostaenix - Transfer Type: To and From  Shower - Transfer To: Transfer tub bench  Shower - Technique: Ambulating  Shower Transfers: Minimal assistance  Shower Transfers Comments: Pt w/ slow pace and required VCs for safe approach to TTB. Min A needed to stand from TTB    AROM: Within functional limits (BUE ROM appeared [facility] during ADL assessment however not formally assessed.)  ADL  Grooming: Setup  Grooming Skilled Clinical Factors: Pt combed her hair seated in recliner chair  UE Bathing: Setup;Verbal cueing; Increased time to complete;Stand by assistance  UE Bathing Skilled Clinical Factors: Pt washed and dried 4/4 components of UE bathing seated on TTB. Pt demonstrated slow initiation and sequencing of all bathing tasks  LE Bathing: Setup;Verbal cueing; Increased time to complete;Contact guard assistance  LE Bathing Skilled Clinical Factors: Pt washed and dried BLEs and mindi area seated on TTB. Pt was able to reach BLEs but required ++ time to complete and slow to initiate and sequence. Pt required CGA in stance at the GB to dry buttocks. UE Dressing: Setup  UE Dressing Skilled Clinical Factors: Pt donned button up shirt w/ setup  LE Dressing: Setup;Verbal cueing; Increased time to complete;Maximum assistance  LE Dressing Skilled Clinical Factors: Pt required total A to thread BLEs into underwear and pants seated on TTB w/ min A in stance to manage clothes over hips. Pt required + assist due to fatigue following the shower. Pt required total A to don socks  Toileting: Moderate assistance  Toileting Skilled Clinical Factors: Pt required  Mod A for pants management in the back. Pt completed mindi care I'ly. Pt was continent of bladder 2x during session however pt's urine was foul smelling-RN notified       Activity Tolerance  Activity Tolerance: Patient tolerated evaluation without incident  Activity Tolerance Comments: Pt requires increased time and effort for all mobility. Limited by HA pain and pain in L hamsting. Transfers  Sit to stand: Minimal assistance; Moderate assistance (Min-mod A from recliner, TTB, and RTS)  Stand to sit: Contact guard assistance       Cognition  Overall Cognitive Status: Exceptions  Arousal/Alertness: Delayed responses to stimuli  Following Commands: Follows one step commands with repetition; Follows one step commands with increased time  Attention Span: Difficulty attending to directions  Memory: Decreased short term memory;Decreased recall of recent events;Decreased recall of precautions  Safety Judgement: Decreased awareness of need for assistance;Decreased awareness of need for safety  Problem Solving: Assistance required to generate solutions;Assistance required to identify errors made  Insights: Decreased awareness of deficits  Initiation: Requires cues for all  Sequencing: Requires cues for some  Cognition Comment: Pt was a poor historian and had difficulty providing PLOF and provided conflicting info to PT and OT. Pt demonstrated delayed processing and required + time to respond to commands. Pt was unaware of why she was in the hospital. Pt was also very slow to initiate and sequence all tasks and had some word finding difficulty.           Sensation  Overall Sensation Status: WFL (reports N/T)         Education Given To: Patient  Education Provided: Role of Therapy;Plan of Care;ADL Adaptive Strategies;Transfer Training;Precautions;Orientation  Education Method: Demonstration;Verbal  Barriers to Learning: Cognition  Education Outcome: Continued education needed  LUE AROM (degrees)  LUE AROM : WFL  LUE General AROM: WFL during ADL assessment  RUE AROM (degrees)  RUE General AROM: WFL during ADL assessment                     G-Code     OutComes Score                                                  AM-PAC Score             Goals  Long Term Goals  Time Frame for Long term goals : 2 weeks  Long Term Goal 1: Pt will complete toileting and toilet transfers MOD I  Long Term Goal 2: Pt will complete LE dressing MOD I w/ use of AE prn  Long Term Goal 3: Pt will complete bathing tasks w/ spvn  Long Term Goal 4: Pt will complete tub transfer w/ spvn  Long Term Goal 5: Pt will verbalize 3 fall prevention strategies to increase safety w/ ADLs at home  Patient Goals   Patient goals : \"take care of myself\"       Therapy Time   Individual Concurrent Group Co-treatment   Time In 1100         Time Out 1230         Minutes 90         Timed Code Treatment Minutes: [address] Yoana Gallo, OT

## 2022-06-08 LAB
ANION GAP SERPL CALCULATED.3IONS-SCNC: 9 MMOL/L (ref 3–16)
BACTERIA: ABNORMAL /HPF
BASOPHILS ABSOLUTE: 0 K/UL (ref 0–0.2)
BASOPHILS RELATIVE PERCENT: 0.7 %
BILIRUBIN URINE: NEGATIVE
BLOOD, URINE: ABNORMAL
BUN BLDV-MCNC: 24 MG/DL (ref 7–20)
CALCIUM SERPL-MCNC: 9.3 MG/DL (ref 8.3–10.6)
CHLORIDE BLD-SCNC: 98 MMOL/L (ref 99–110)
CLARITY: CLEAR
CO2: 29 MMOL/L (ref 21–32)
COLOR: YELLOW
CREAT SERPL-MCNC: 1 MG/DL (ref 0.6–1.2)
EOSINOPHILS ABSOLUTE: 0.2 K/UL (ref 0–0.6)
EOSINOPHILS RELATIVE PERCENT: 4.3 %
EPITHELIAL CELLS, UA: ABNORMAL /HPF (ref 0–5)
GFR AFRICAN AMERICAN: >60
GFR NON-AFRICAN AMERICAN: 52
GLUCOSE BLD-MCNC: 110 MG/DL (ref 70–99)
GLUCOSE URINE: NEGATIVE MG/DL
HCT VFR BLD CALC: 37.9 % (ref 36–48)
HEMOGLOBIN: 12.6 G/DL (ref 12–16)
KETONES, URINE: NEGATIVE MG/DL
LEUKOCYTE ESTERASE, URINE: ABNORMAL
LYMPHOCYTES ABSOLUTE: 1.2 K/UL (ref 1–5.1)
LYMPHOCYTES RELATIVE PERCENT: 22.5 %
MCH RBC QN AUTO: 31.9 PG (ref 26–34)
MCHC RBC AUTO-ENTMCNC: 33.1 G/DL (ref 31–36)
MCV RBC AUTO: 96.2 FL (ref 80–100)
MICROSCOPIC EXAMINATION: YES
MONOCYTES ABSOLUTE: 0.6 K/UL (ref 0–1.3)
MONOCYTES RELATIVE PERCENT: 10.7 %
NEUTROPHILS ABSOLUTE: 3.4 K/UL (ref 1.7–7.7)
NEUTROPHILS RELATIVE PERCENT: 61.8 %
NITRITE, URINE: POSITIVE
PDW BLD-RTO: 12.7 % (ref 12.4–15.4)
PH UA: 7 (ref 5–8)
PLATELET # BLD: 281 K/UL (ref 135–450)
PMV BLD AUTO: 9.2 FL (ref 5–10.5)
POTASSIUM REFLEX MAGNESIUM: 4.1 MMOL/L (ref 3.5–5.1)
PROTEIN UA: NEGATIVE MG/DL
RBC # BLD: 3.94 M/UL (ref 4–5.2)
RBC UA: ABNORMAL /HPF (ref 0–4)
SODIUM BLD-SCNC: 136 MMOL/L (ref 136–145)
SPECIFIC GRAVITY UA: 1.01 (ref 1–1.03)
URINE TYPE: ABNORMAL
UROBILINOGEN, URINE: 1 E.U./DL
WBC # BLD: 5.5 K/UL (ref 4–11)
WBC UA: ABNORMAL /HPF (ref 0–5)

## 2022-06-08 PROCEDURE — 97130 THER IVNTJ EA ADDL 15 MIN: CPT

## 2022-06-08 PROCEDURE — 97116 GAIT TRAINING THERAPY: CPT

## 2022-06-08 PROCEDURE — 6370000000 HC RX 637 (ALT 250 FOR IP): Performed by: PHYSICAL MEDICINE & REHABILITATION

## 2022-06-08 PROCEDURE — 99232 SBSQ HOSP IP/OBS MODERATE 35: CPT | Performed by: PHYSICAL MEDICINE & REHABILITATION

## 2022-06-08 PROCEDURE — 87088 URINE BACTERIA CULTURE: CPT

## 2022-06-08 PROCEDURE — 94640 AIRWAY INHALATION TREATMENT: CPT

## 2022-06-08 PROCEDURE — 97129 THER IVNTJ 1ST 15 MIN: CPT

## 2022-06-08 PROCEDURE — 85025 COMPLETE CBC W/AUTO DIFF WBC: CPT

## 2022-06-08 PROCEDURE — 87186 SC STD MICRODIL/AGAR DIL: CPT

## 2022-06-08 PROCEDURE — 97530 THERAPEUTIC ACTIVITIES: CPT

## 2022-06-08 PROCEDURE — 94761 N-INVAS EAR/PLS OXIMETRY MLT: CPT

## 2022-06-08 PROCEDURE — 81001 URINALYSIS AUTO W/SCOPE: CPT

## 2022-06-08 PROCEDURE — 36415 COLL VENOUS BLD VENIPUNCTURE: CPT

## 2022-06-08 PROCEDURE — 1280000000 HC REHAB R&B

## 2022-06-08 PROCEDURE — 97535 SELF CARE MNGMENT TRAINING: CPT

## 2022-06-08 PROCEDURE — 80048 BASIC METABOLIC PNL TOTAL CA: CPT

## 2022-06-08 PROCEDURE — 87086 URINE CULTURE/COLONY COUNT: CPT

## 2022-06-08 PROCEDURE — 94150 VITAL CAPACITY TEST: CPT

## 2022-06-08 RX ADMIN — PANTOPRAZOLE SODIUM 40 MG: 40 TABLET, DELAYED RELEASE ORAL at 06:32

## 2022-06-08 RX ADMIN — THERA TABS 1 TABLET: TAB at 07:56

## 2022-06-08 ASSESSMENT — PAIN DESCRIPTION - FREQUENCY: FREQUENCY: INTERMITTENT

## 2022-06-08 ASSESSMENT — PAIN DESCRIPTION - ORIENTATION: ORIENTATION: LEFT

## 2022-06-08 ASSESSMENT — PAIN SCALES - GENERAL
PAINLEVEL_OUTOF10: 0
PAINLEVEL_OUTOF10: 2

## 2022-06-08 ASSESSMENT — PAIN DESCRIPTION - DESCRIPTORS: DESCRIPTORS: ACHING

## 2022-06-08 ASSESSMENT — PAIN DESCRIPTION - ONSET: ONSET: GRADUAL

## 2022-06-08 ASSESSMENT — PAIN DESCRIPTION - LOCATION: LOCATION: LEG

## 2022-06-08 ASSESSMENT — PAIN DESCRIPTION - PAIN TYPE: TYPE: ACUTE PAIN

## 2022-06-08 ASSESSMENT — PAIN - FUNCTIONAL ASSESSMENT: PAIN_FUNCTIONAL_ASSESSMENT: ACTIVITIES ARE NOT PREVENTED

## 2022-06-08 NOTE — CARE COORDINATION
CM called son, Avelino Rizo [phone] and then met with Daksha VELEZ at bedside to discuss DC plan options in more detail. Family hopes that pt can return home with [facility], but also open to short-term SNF stay if needed before returning home. CM discussed options for aide services - COA, private pay. Pt lives in [place] so Daksha Velardetti found the [facility] which has similar services. CM can make referral.  She reported being aware that [facility] may be difficult to staff in pt's area, as it happened in the past.  Pt stayed at their home in [facility] to receive therapies, then returned to her home in [place]. They are agreeable to doing this again if needed. CM submitted request to complete POA paperwork per [facility] request, however pt thinks she already had this completed. Family will check pt's documents at home and let CM know. CM will continue to follow for DC needs and recs.         Electronically signed by HUNTER Frank, ROBERT on 6/8/2022 at 5:12 PM

## 2022-06-08 NOTE — FLOWSHEET NOTE
06/08/22 1643   Encounter Summary   Encounter Overview/Reason  Advance Care Planning   Service Provided For: Patient and family together   Referral/Consult From: [address]  (son)   Last Encounter    (es 6/8)   Complexity of Encounter High   Begin Time 1600   End Time  1645   Total Time Calculated 45 min   Advance Care Planning   Type ACP conversation  (see acp note)

## 2022-06-08 NOTE — FLOWSHEET NOTE
06/08/22 1622   Encounter Summary   Encounter Overview/Reason  Initial Encounter   Service Provided For: Patient and family together  (daughter in law)   Referral/Consult From: [address] [facility] [address] Children;Family members   Last Encounter    (es 6/8)   Complexity of Encounter Moderate   Begin Time 1558   End Time  1623   Total Time Calculated 25 min   Encounter    Type Initial Screen/Assessment   Spiritual/Emotional needs   Type Spiritual Support   Assessment/Intervention/Outcome   Assessment Calm;Coping   Intervention Active listening;Discussed belief system/Tenriism practices/sha; Explored/Affirmed feelings, thoughts, concerns;Explored Coping Skills/Resources;Prayer (assurance of)/Rocky   Outcome Engaged in conversation;Receptive   Plan and Referrals   Plan/Referrals Other (Comment)  (will follow as needed)

## 2022-06-08 NOTE — PROGRESS NOTES
Physical Therapy  Facility/Department: [facility] ACUTE REHAB UNIT  Rehabilitation Physical Therapy Treatment Note    NAME: Andres Diaz  : 1934 (80 y.o.)  MRN: [id number]  CODE STATUS: Limited    Date of Service: 22       Restrictions:  Position Activity Restriction  Other position/activity restrictions: PWB 25-50% RLE per RN discussion with ortho, up with assist, Keep SBP<180, HOB >30 degrees     SUBJECTIVE  Subjective  Subjective: Pt seated in recliner upon approach and agreeable to PT. Pain: Reports 5/10 pain at rest in L hamstring which increases to 8/10 with mobility- RN notified. OBJECTIVE  Cognition  Overall Cognitive Status: Exceptions  Arousal/Alertness: Delayed responses to stimuli  Following Commands: Follows one step commands with repetition; Follows one step commands with increased time  Attention Span: Difficulty attending to directions  Memory: Decreased short term memory;Decreased recall of recent events;Decreased recall of precautions  Safety Judgement: Decreased awareness of need for assistance;Decreased awareness of need for safety  Problem Solving: Assistance required to generate solutions;Assistance required to identify errors made  Insights: Decreased awareness of deficits  Initiation: Requires cues for all  Sequencing: Requires cues for some  Cognition Comment: Pt demonstrated delayed processing and required + time to respond to commands. Pt was unaware of why she was in the hospital. Pt was also very slow to initiate and sequence all tasks and had some word finding difficulty. Orientation  Orientation Level: Oriented to person;Disoriented to time;Oriented to place; Disoriented to situation    Functional Mobility  Balance  Standing Balance: Stand by assistance (static stance with RW)  Transfers  Surface: To chair with arms;From chair with arms  Additional Factors: Verbal cues; Hand placement cues; Increased time to complete; With handrails  Sit to Stand  Assistance Level:  Moderate Devices: Call light within reach;Nurse notified; Chair alarm in place; Left in chair (left in wc with wheelchair cushion applied for increased comfort- reports increased comfort in wc compared to recliner. RN notified.)    EDUCATION  Education  Education Given To: Patient  Education Provided: Role of Therapy;Plan of Care;Precautions; Safety; Mobility Training;Transfer Training;Equipment  Education Provided Comments: Pt able to recall that she is supposed to be putting decreased weight through R LE however requires repeated VC for precautions throughout session.   Education Method: Demonstration;Verbal  Barriers to Learning: Cognition  Education Outcome: Verbalized understanding;Continued education needed        Therapy Time   Individual Concurrent Group Co-treatment   Time In 0830         Time Out 0930         Minutes 60           Timed Code Treatment Minutes: Kimmy [address] [place] [address]

## 2022-06-08 NOTE — PROGRESS NOTES
Occupational Therapy  Facility/Department: [facility] ACUTE REHAB UNIT  Rehabilitation Occupational Therapy Daily Treatment Note    Date: 22  Patient Name: Cara Kern       Room: 3103/3103-01  MRN: [id number]  Account: [de-identified]   : 1934  (80 y.o.) Gender: female                    Past Medical History:  has a past medical history of Allergic rhinitis, GERD (gastroesophageal reflux disease), Hyperlipidemia, and Laceration of head. Past Surgical History:   has a past surgical history that includes knee surgery; Appendectomy; fracture surgery (2010); Colonoscopy (); and Forearm surgery (Left, 8/10/2020). Restrictions  Other position/activity restrictions: PWB 25-50% RLE per RN discussion with ortho, up with assist, Keep SBP<180, HOB >30 degrees    Subjective  Subjective: Pt was seated in w/c upon arrival. Pt was pleasant and agreeable to OT                Objective     Cognition  Overall Cognitive Status: Exceptions  Arousal/Alertness: Delayed responses to stimuli  Following Commands: Follows one step commands with repetition; Follows one step commands with increased time  Attention Span: Difficulty attending to directions  Memory: Decreased short term memory;Decreased recall of recent events;Decreased recall of precautions  Safety Judgement: Decreased awareness of need for assistance;Decreased awareness of need for safety  Problem Solving: Assistance required to generate solutions;Assistance required to identify errors made  Insights: Decreased awareness of deficits  Initiation: Requires cues for all  Sequencing: Requires cues for some  Cognition Comment: Pt demonstrated delayed processing and required + time to respond to commands. Pt required increased time to initiate and sequence all tasks and she had word finding difficulty and difficulty telling a story.            ADL  Grooming/Oral Hygiene  Assistance Level: Contact guard assist;Increased time to complete;Set-up  Skilled Clinical Factors: Pt completed oral care in stance at sink and hand hygiene in stance at sink for ~6 mins w/ CGA  Toileting  Assistance Level: Contact guard assist;Increased time to complete  Skilled Clinical Factors: Pt was continent of bowel and bladder and completed mindi care and pants management w/ CGA  Toilet Transfers  Technique:  (ambulating)  Equipment: Raised toilet seat with arms  Assistance Level: Contact guard assist  Skilled Clinical Factors: Pt required min VCs to back up to RTS      Instrumental ADL's  Instrumental ADLs: Yes  Light Housekeeping  Light Housekeeping Level: Cloretta Cozier Housekeeping Level of Assistance: Contact guard assistance  Light Housekeeping: Pt completed laundry folding task in stance at tabeltop to address dynamic balance for ADLs and IADLs and for improved understanding of WB precautions. Pt folded 3 clothing items in stance w/ CGA and required min VCs to prevent excessive weight through RLE. Pt w/ no LOB during activity and tolerated stance for over 6 mins w/o rest break. Functional Mobility  Device: Rolling walker  Activity: To/From bathroom (to/from dining room)  Assistance Level: Contact guard assist  Skilled Clinical Factors: Pt ambulated w/ RW to/from bathroom and dining room w/ CGA w/ mod VCs needed for increased WB through BUEs. OT educated pt about 25%-50% WB and why it is important to use BUEs to reduce weight through RLE. Pt ambulated w/ very slow pace and demonstrating shuffling gait and kyphotic posture. Pt required ++ time for ambulation.   Sit to Supine  Assistance Level: Minimal assistance  Skilled Clinical Factors: Min A needed to lift RLE into bed + time needed to complete  Sit to Stand  Assistance Level: Minimal assistance;Contact guard assist  Skilled Clinical Factors: CGA-Min A from w/c w/ min VCs needed for ant weight shift  Stand to Sit  Assistance Level: Contact guard assist  Skilled Clinical Factors: VCs needed to reach back for chair Assessment  Assessment  Assessment: Pt demonstrated improved performance w/ functional transfers today and completed w/ CGA-Min A. Pt also demonstrated improved performance w/ toileting tasks and completed w/ CGA. Pt however is very slow w/ all tasks and requires ++ time for initiation and sequencing of all tasks. Pt continues to benefit from OT. Cont OT per POC  Activity Tolerance: Patient tolerated treatment well  Discharge Recommendations: 24 hour supervision or assist;Home with Home health OT  OT Equipment Recommendations  Other: Pt reported she has a TTB, RTS, and grab bars however pt is an unreliable historian. Cont to assess for additional needs  Safety Devices  Safety Devices in place: Yes  Type of devices: Call light within reach; Bed alarm in place; Left in bed;Nurse notified    Patient Education  Education  Education Given To: Patient  Education Provided: Role of Therapy;Precautions; Safety;ADL Function;Transfer Training  Education Provided Comments: [employer] precautions  Education Method: Demonstration  Barriers to Learning: Cognition  Education Outcome: Verbalized understanding;Continued education needed    Plan  Plan  Times per Week: 5x a week for 60 mins daily  Times per Day: Daily  Current Treatment Recommendations: Functional mobility training; Endurance training; Safety education & training;Patient/Caregiver education & training;Equipment evaluation, education, & procurement;Self-Care / ADL;Cognitive reorientation;Strengthening;Neuromuscular re-education;Pain management;Home management training    Goals  Patient Goals   Patient goals : \"take care of myself\"  Long Term Goals  Time Frame for Long term goals : 2 weeks  Long Term Goal 1: Pt will complete toileting and toilet transfers MOD I-ongoing  Long Term Goal 2: Pt will complete LE dressing MOD I w/ use of AE prn-ongoing  Long Term Goal 3: Pt will complete bathing tasks w/ spvn-ongoing  Long Term Goal 4: Pt will complete tub transfer w/ spvn-ongoing  Long Term Goal 5: Pt will verbalize 3 fall prevention strategies to increase safety w/ ADLs at home-ongoing    AM-PAC Score               Therapy Time   Individual Concurrent Group Co-treatment   Time In 3812         Time Out 1353         Minutes 68         Timed Code Treatment Minutes: [address], [place]

## 2022-06-08 NOTE — ACP (ADVANCE CARE PLANNING)
Received [facility] Consult to discuss advance directives with patient. Patient and her daughter-in-law were in the room. I explained the difference between [address] and DNR/ doctor order. Patient says that she has HCPOA and living will paperwork. She said that her son is named as her HCPOA in paperwork and he is also her NOK. Pt's daughter-in-law says that she thinks she knows where it is on kitchen table.   Advised patient and daughter-in-law to bring to hospital.

## 2022-06-08 NOTE — PROGRESS NOTES
Patient is sitting up the chair, call light and bedside table are within reach. Patient has fall precautions in place, alarm is engaged and educated to call for assistance to transfer. C/o Left posterior thigh pain, declines medications, heat pack applied.

## 2022-06-08 NOTE — PROGRESS NOTES
Department of Physical Medicine & Rehabilitation  Progress Note    Patient Identification:  Cara Kern  [id number]  : 1934  Admit date: 2022    Chief Complaint: Hip fracture, right, closed, initial encounter ([facility] [address]    Subjective:   No acute events overnight. Patient seen this am. She is progressing with therapy and motivated. Denies any fevers, chills, chest pain, palpitations, leg swelling, cough, shortness of breath, difficulty breathing, wheezing, abdominal pain, nausea, vomiting, diarrhea. Objective:  Patient Vitals for the past 24 hrs:   BP Temp Temp src Pulse Resp SpO2   22 0752 -- -- -- -- -- 98 %   22 0747 (!) 127/90 97.4 °F (36.3 °C) Oral 70 18 95 %   22 2127 (!) 151/78 98 °F (36.7 °C) Oral 72 16 97 %   22 1430 -- -- -- -- 16 99 %   22 1100 131/66 97.7 °F (36.5 °C) Oral 78 18 97 %     Const: Alert. No distress, pleasant. HEENT: Normocephalic, atraumatic. Normal sclera/conjunctiva. MMM. CV: Regular rate and rhythm. Resp: No respiratory distress. Lungs CTAB. Abd: Soft, nontender, nondistended, NABS+   Ext: No edema. Neuro: Alert, oriented, appropriately interactive. Psych: Cooperative, appropriate mood and affect    Laboratory data: Available via EMR.    Last 24 hour lab  Recent Results (from the past 24 hour(s))   Urinalysis    Collection Time: 22  2:36 AM   Result Value Ref Range    Color, UA Yellow Straw/Yellow    Clarity, UA Clear Clear    Glucose, Ur Negative Negative mg/dL    Bilirubin Urine Negative Negative    Ketones, Urine Negative Negative mg/dL    Specific Gravity, UA 1.015 1.005 - 1.030    Blood, Urine TRACE-INTACT (A) Negative    pH, UA 7.0 5.0 - 8.0    Protein, UA Negative Negative mg/dL    Urobilinogen, Urine 1.0 <2.0 E.U./dL    Nitrite, Urine POSITIVE (A) Negative    Leukocyte Esterase, Urine SMALL (A) Negative    Microscopic Examination YES     Urine Type Voided    Microscopic Urinalysis    Collection Time: 22  2:36 AM   Result Value Ref Range    WBC, UA 6-9 (A) 0 - 5 /HPF    RBC, UA 3-4 0 - 4 /HPF    Epithelial Cells, UA 2-5 0 - 5 /HPF    Bacteria, UA 4+ (A) None Seen /HPF   Basic Metabolic Panel w/ Reflex to MG    Collection Time: 06/08/22  7:24 AM   Result Value Ref Range    Sodium 136 136 - 145 mmol/L    Potassium reflex Magnesium 4.1 3.5 - 5.1 mmol/L    Chloride 98 (L) 99 - 110 mmol/L    CO2 29 21 - 32 mmol/L    Anion Gap 9 3 - 16    Glucose 110 (H) 70 - 99 mg/dL    BUN 24 (H) 7 - 20 mg/dL    CREATININE 1.0 0.6 - 1.2 mg/dL    GFR Non-African American 52 (A) >60    GFR African American >60 >60    Calcium 9.3 8.3 - 10.6 mg/dL   CBC auto differential    Collection Time: 06/08/22  7:24 AM   Result Value Ref Range    WBC 5.5 4.0 - 11.0 K/uL    RBC 3.94 (L) 4.00 - 5.20 M/uL    Hemoglobin 12.6 12.0 - 16.0 g/dL    Hematocrit 37.9 36.0 - 48.0 %    MCV 96.2 80.0 - 100.0 fL    MCH 31.9 26.0 - 34.0 pg    MCHC 33.1 31.0 - 36.0 g/dL    RDW 12.7 12.4 - 15.4 %    Platelets 908 461 - 237 K/uL    MPV 9.2 5.0 - 10.5 fL    Neutrophils % 61.8 %    Lymphocytes % 22.5 %    Monocytes % 10.7 %    Eosinophils % 4.3 %    Basophils % 0.7 %    Neutrophils Absolute 3.4 1.7 - 7.7 K/uL    Lymphocytes Absolute 1.2 1.0 - 5.1 K/uL    Monocytes Absolute 0.6 0.0 - 1.3 K/uL    Eosinophils Absolute 0.2 0.0 - 0.6 K/uL    Basophils Absolute 0.0 0.0 - 0.2 K/uL       Therapy progress:  PT  Position Activity Restriction  Other position/activity restrictions: PWB 25-50% RLE per RN discussion with ortho, up with assist, Keep SBP<180, HOB >30 degrees  Objective     Sit to Stand: Minimal Assistance,Moderate Assistance (fluctuates fluctuates min<>mod at EOB/wc, min A at raised toilet seat ; all with RW)  Stand to sit: Contact guard assistance,Minimal Assistance (completed at [facility] [place]  min A d/t decreased eccentric control, CGA with increased VC for eccentric control)  Device: Rolling Walker  Assistance: Contact guard assistance,Minimal assistance (CGA with occasional min A required for RW placement)  Distance: 15' x2, small distances in bathroom  OT  PT Equipment Recommendations  Equipment Needed: Yes  Mobility Devices: Josette Paul: Rolling  Toilet - Technique: Ambulating  Equipment Used: Raised toilet seat with rails  Assessment        SLP          Body mass index is 18.87 kg/m². Assessment and Plan:  1. Acute debilitation secondary to right hip fracture  - Right femoral neck fracture with conservative treatment  - PT/OT     2. Subarachnoid hemorrhage secondary to fall   - CT head showed   - Repeat CT head showed stable bleed  - Neurosurgery signed off as no intervention indicated  - Will need repeat head CT in 2 weeks   - Keppra 500 mg BID   - SBP goal < 180  - PT/OT   - Pain control     3. Close fracture of the neck of the right femoral  - s/p fall   - Orthopedics recommended conservative treatment   - PT/OT    Impairments: Decreased functional mobility, Decreased ADLs     Bladder - high risk retention - Monitor PVRs, SC prn >300cc     Bowel - high risk constipation - colace BID, PRN miralax and MoM. follow bowel movements. Enema or suppository if needed.      Safety - fall precautions     PPx  DVT: SCD  GI: pantoprazole    Rehab Progress: Improving  Anticipated Dispo: home w [facility]  Services/DME: TBD  ELOS: TBD      This patient will be staffed and discussed with DO Mis Carcamo DO PGY-3  6/8/2022  9:32 AM      This patient has been seen, examined, and discussed with the resident. This note has been altered to reflect my own examination findings, impression, and recommendations.      Irving Abrams D.O. M.P.H  PM&R  6/8/2022  10:32 AM

## 2022-06-08 NOTE — PROGRESS NOTES
family friend) and grandchildren- take them somewhere or at home; watch TV       Vision  Vision: Impaired  Vision Exceptions: Wears glasses at all times (glasses not at hospital)  Hearing  Hearing: Within functional limits  Hearing Exceptions: Hard of hearing/hearing concerns  Barriers toward progress: Confusion, Cognitive deficit, Limited safety awareness and Limited insight into deficits        Date: 6/8/2022      Tx session 1 Tx session 2   Total Timed Code Min 30 30   Total Treatment Minutes 30 30   Individual Treatment Minutes 30 30   Group Treatment Minutes 0 0   Co-Treat Minutes 0 0   Brief Exception: NA NA   Pain None indicated None indicated   Pain Intervention: [] RN notified  [] Repositioned  [] Intervention offered and patient declined  [x] N/A  [] Other: [] RN notified  [] Repositioned  [] Intervention offered and patient declined  [x] N/A  [] Other:   Subjective:     Pt seated in wheelchair upon entry with PCAs at side. Pt stated, \"I was wondering when you were going to come. \" See below for more information. Agreeable and participatory throughout session despite stating she was tired at beginning of session. Pt just completed transfer to bed with OT. Agreeable to tx. Daughter in law entered at end of session. She reported that pt is the same cognitively compared to prior to her hospital admission. See education below. Objective / Goals:     Pt will complete alternating attention tasks with 80% accuracy given min cues. Goal not directly targeted. Pt able to alternate attention between letter constraints during graded naming task. Pt required MAX cues for alternating attention task. Completed with high accuracy due to cueing from SLP. Pt demonstrated alternating attention at times with the stimulus items and visual cues from SLP. Perseverations also noted throughout this session. Pt will complete graded naming tasks with 80% accuracy given min cues.     Divergent naming task given letter constraint completed with 73% accuracy given MAX cues. Cueing included phonemic cues, phrase completion, qualitative descriptions, and visuals. Pt stated that she was unable to determine animals because she [place] not been to the zoo in a [place]. When questioned if her difficulty was attributed to word finding or memory, pt indicated it was memory. Goal not targeted this session. Pt will complete graded memory tasks with 80% accuracy given mod cues. Goal not directly targeted, but pt could not recall tasks from evaluation from previous day or what skills were discussed at end of evaluation. Goal not directly targeted this session. Pt stated that she completed task from previous SLP session with OT. Pt will complete graded tasks with adequate self-monitoring and self-correcting with 80% accuracy indepenently. Goal not targeted this session. No evidence of self-monitoring or attempts to self-correct this session. Pt will participate in ongoing cognitive-linguistic assessment. Pt required mod cues for orientation to time as pt stated that SLP was late to session. When prompted, pt did attend to schedule on whiteboard and then clock. Goal not targeted this session. Other areas targeted:     Education:   Pt educated to role of SLP, skills targeted, rationale for tx tasks, and performance w/ tx tasks. Also educated pt for safety and importance of calling RN when wanting to be transferred. Pt demonstrated understanding of how to call RN by pointing to nurse call button. Pt and dtr-in-law educated to role of SLP, tasks, targeted, SLP POC and rationale, as well as skills targeted.     Safety Devices: [x] Call light within reach  [x] Chair alarm activated and connected to nurse call light system  [] Bed alarm activated   [x] Other: [x] Call light within reach  [] Chair alarm activated and connected to nurse call light system  [x] Bed alarm activated  [x] Other: call light reinforced; dtr in law

## 2022-06-08 NOTE — PROGRESS NOTES
Pt assessment and vitals completed. Pt has no complaints at this time, resting comfortably in bed. Will continue to monitor.

## 2022-06-08 NOTE — PLAN OF CARE
Problem: Skin/Tissue Integrity  Goal: Absence of new skin breakdown  Description: 1. Monitor for areas of redness and/or skin breakdown  2. Assess vascular access sites hourly  3. Every 4-6 hours minimum:  Change oxygen saturation probe site  4. Every 4-6 hours:  If on nasal continuous positive airway pressure, respiratory therapy assess nares and determine need for appliance change or resting period. Outcome: Progressing  Note:   See Reed scale. Encourage/assist pt to turn and reposition every two hours and as needed. Heels elevated off bed. Protective barrier placed as needed. Patient kept clean and dry. Pillows used for positioning. Will continue to monitor for skin breakdown. Problem: Pain  Goal: Verbalizes/displays adequate comfort level or baseline comfort level  6/8/2022 1941 by Rajinder Miranda RN  Outcome: Progressing  Note: Pt voices pain needs appropriately, pain is assessed during shift. Problem: Safety - Adult  Goal: Free from fall injury  Outcome: Progressing    Free From Fall Injury: Instruct family/caregiver on patient safety  Note: Client remains free from falls, bed/chair alarm in place, door open, encouraged to use call light for needs, call light is within reach, bed locked in lowest position,  Will continue to monitor.

## 2022-06-09 PROCEDURE — 97110 THERAPEUTIC EXERCISES: CPT

## 2022-06-09 PROCEDURE — 97129 THER IVNTJ 1ST 15 MIN: CPT

## 2022-06-09 PROCEDURE — 97530 THERAPEUTIC ACTIVITIES: CPT

## 2022-06-09 PROCEDURE — 97116 GAIT TRAINING THERAPY: CPT

## 2022-06-09 PROCEDURE — 6370000000 HC RX 637 (ALT 250 FOR IP): Performed by: PHYSICAL MEDICINE & REHABILITATION

## 2022-06-09 PROCEDURE — 6370000000 HC RX 637 (ALT 250 FOR IP): Performed by: STUDENT IN AN ORGANIZED HEALTH CARE EDUCATION/TRAINING PROGRAM

## 2022-06-09 PROCEDURE — 97535 SELF CARE MNGMENT TRAINING: CPT

## 2022-06-09 PROCEDURE — 99233 SBSQ HOSP IP/OBS HIGH 50: CPT | Performed by: PHYSICAL MEDICINE & REHABILITATION

## 2022-06-09 PROCEDURE — 94150 VITAL CAPACITY TEST: CPT

## 2022-06-09 PROCEDURE — 1280000000 HC REHAB R&B

## 2022-06-09 PROCEDURE — 97130 THER IVNTJ EA ADDL 15 MIN: CPT

## 2022-06-09 RX ORDER — AMOXICILLIN 250 MG/1
500 CAPSULE ORAL EVERY 8 HOURS SCHEDULED
Status: DISCONTINUED | OUTPATIENT
Start: 2022-06-09 | End: 2022-06-09

## 2022-06-09 RX ORDER — SULFAMETHOXAZOLE AND TRIMETHOPRIM 800; 160 MG/1; MG/1
1 TABLET ORAL EVERY 12 HOURS SCHEDULED
Status: DISCONTINUED | OUTPATIENT
Start: 2022-06-09 | End: 2022-06-11 | Stop reason: HOSPADM

## 2022-06-09 RX ADMIN — PANTOPRAZOLE SODIUM 40 MG: 40 TABLET, DELAYED RELEASE ORAL at 06:28

## 2022-06-09 RX ADMIN — SULFAMETHOXAZOLE AND TRIMETHOPRIM 1 TABLET: 800; 160 TABLET ORAL at 10:48

## 2022-06-09 RX ADMIN — THERA TABS 1 TABLET: TAB at 08:22

## 2022-06-09 RX ADMIN — SULFAMETHOXAZOLE AND TRIMETHOPRIM 1 TABLET: 800; 160 TABLET ORAL at 21:08

## 2022-06-09 ASSESSMENT — PAIN SCALES - GENERAL
PAINLEVEL_OUTOF10: 0
PAINLEVEL_OUTOF10: 0

## 2022-06-09 ASSESSMENT — PAIN - FUNCTIONAL ASSESSMENT: PAIN_FUNCTIONAL_ASSESSMENT: NONE - DENIES PAIN

## 2022-06-09 NOTE — PROGRESS NOTES
Pt assessment and vitals completed. . Pt has no complaints at this time, resting comfortably in bed. Will continue to monitor.

## 2022-06-09 NOTE — PROGRESS NOTES
Six sutures removed from right side of head. Pt. tolerated well. Instructor present at bedside during procedure.

## 2022-06-09 NOTE — PROGRESS NOTES
Speech Language Pathology  ACUTE REHAB UNIT  SPEECH/LANGUAGE PATHOLOGY      [] Daily  [x] Weekly Care Conference Note  [] Discharge    Lamont Blanc      :1934  GJW:[id number]  Rehab Dx/Hx: Hip fracture, right, closed, initial encounter ([facility] [address] [S72.001A]    Precautions: [] Aspiration  [x] Fall risk  [] Sternal  [] Seizure [] Hip  [] Weight Bearing [] Other     ST Dx: [] Aphasia  [] Dysarthria  [] Apraxia   [] Oropharyngeal dysphagia [x] Cognitive Impairment  [] Other:   Date of Admit: 2022  Room #: 3103/3103-01  Date: 2022          Current Diet Order:ADULT DIET; Regular  ADULT ORAL NUTRITION SUPPLEMENT; Breakfast, Dinner; Standard High Calorie/High Protein Oral Supplement  ADULT ORAL NUTRITION SUPPLEMENT; Lunch; Frozen Oral Supplement            Comments: Pt presented to [facility] ED on  secondary to a fall to the head. CT findings showed a subarachnoid hemmorrhage. Pt was transferred to [facility] for a neurology consult and was admitted for R hip fracture and SAH. While admitted, pt received SLP services for cognitive-linguistic impairment, mild aphasia, and dysphagia. Pt was dismissed from SLP services for dysphagia on . Pt has hx of HTN, lacunar stroke in cerebellum, basal ganglia, and L thalamus in 2019, and baseline confusion. MRI reports from 2019 also stated that findings are consistent with dementia. Per chart review, pt has also reported several falls in the last year or so. Chart review also noted that hx provided by pt may not be reliable. Subjective: Pt resting in chair upon entry with meal tray in front of her. Easily roused and agreeable to evaluation. Lives With: Alone  Homemaking Responsibilities: Yes  Education: graduated HS  Occupation: Retired  Type of Occupation: worked for [employer] for 40 years- couldn't recall role, but stated \"if they needed help with anything, I helped. I did anything to help them. \"  Leisure & Hobbies: spend time with 2 kids (son and a close family friend) and grandchildren- take them somewhere or at home; watch TV       Vision  Vision: Impaired  Vision Exceptions: Wears glasses at all times (glasses not at hospital)  Hearing  Hearing: Within functional limits  Hearing Exceptions: Hard of hearing/hearing concerns  Barriers toward progress: Confusion, Cognitive deficit, Limited safety awareness and Limited insight into deficits        Date: 6/9/2022       Tx session 1 Tx session 2 Weekly Summary   Total Timed Code Min 30 42    Total Treatment Minutes 30 42    Individual Treatment Minutes 30 42    Group Treatment Minutes 0 0    Co-Treat Minutes 0 0    Brief Exception: NA NA    Pain None indicated None Indicated    Pain Intervention: [] RN notified  [] Repositioned  [] Intervention offered and patient declined  [x] N/A  [] Other: [] RN notified  [] Repositioned  [] Intervention offered and patient declined  [x] N/A  [] Other:    Subjective:     Pt seated in chair upon entry with meal tray. Endorsed lethargy due to watching TV late last night. Stated that she doesn't really sleep well at night and often sleeps during the day. Pt oriented to place but not to time (see below). Pt resting in bed upon entry and easily roused. Agreeable to tx session. Objective / Goals:      Pt will complete alternating attention tasks with 80% accuracy given min cues. Goal not targeted this session. Pt completed with MAX fading to min cues trail making task. Completed with 100% accuracy. Goal not met- continue goal   Pt will complete graded naming tasks with 80% accuracy given min cues. Pt with anomia x1 for high frequency functional object. Pt with inconsistent difficulty naming activities during drawing conclusions task. Achieved 50% accuracy. Goal not met- continue goal   Pt will complete graded memory tasks with 80% accuracy given mod cues. No recall of tx tasks from previous day or what SLP works on w/ pt.      Reduced memory throughout session stating, \"I forget what I am supposed to be doing. \"   Pt demonstrated use of external tool to recall date at end of session. No recall of previous sessions when questioned. During alternating attention visual trail making task, pt often could not recall place within task and required reminders. Goal not met- continue goal   Pt will complete graded tasks with adequate self-monitoring and self-correcting with 80% accuracy indepenently. Pt required MAX cues for self-monitoring during 4-6 step sequencing task. No attempts for self-correction. Pt with MAX fading to independence with use of visual cue for self-monitoring during alternating attention visual trail making task. No initiation for self-correction, but able to correct errors when prompted. Independently utilized strategy of marking off completed items given an indirect model of strategy to self-monitor and self-check. Pt required increased time for responses and task completion. Goal not met- continue goal   Pt will participate in ongoing cognitive-linguistic assessment. Goal not targeted this session. Goal not targeted this session. Goal not met- continue goal   Other areas targeted: Pt oriented to place but required MAX cues for orientation to time. Pt stated it was January and was unable to provide time of day independently. Education provided for external tools accessible in pt's room to assist in orientation. 4-6 step sequencing of functional activities: pt required mod to MAX cues for sequencing. MAX cues for attention to all steps given in activity and reduced organization. Education:   Pt educated to rationale for tx tasks. Education also provided for importance of sleeping at night and remaining awake during the day. Also provided education for safety awareness and use of call light if pt needs something. Education provided for rationale for tx tasks, skills targeted, and performance on tx tasks.  Also provided education for delirium precautions, specifically staying awake during day and frequent orientation to date and time. Safety Devices: [x] Call light within reach  [x] Chair alarm activated and connected to nurse call light system  [] Bed alarm activated   [x] Other: reinforced use of call light [x] Call light within reach  [] Chair alarm activated and connected to nurse call light system  [x] Bed alarm activated  [x] Other: reinforced use of call light    Assessment: Pt with moderate cognitive impairment exacerbated by current UTI (per chart review). Impairments in executive function and memory. Pt demonstrates reduced organization and short-term/working memory impairments. Inconsistent insight to deficits and decreased safety awareness. Plan: Continue as per plan of care.        Interventions used this date:  [] Speech/Language Treatment  [] Instruction in HEP  [] Dysphagia Treatment [x] Cognitive Treatment   [] Other:    Discharge recommendations:  [] Home independently  [] Home with assistance [x]  24 hour supervision  [] ECF [x] Other: assistance with financial and medication management  Continued Tx Upon Discharge: ? [] Yes    [] No    [x] TBD based on progress while on ARU     [] Vital Stim indicated     [] Other:   Estimated discharge date: Not yet established      Electronically signed by:    Misha Mckeon MA, CF-SLP  [id number]  Speech-Language Pathologist

## 2022-06-09 NOTE — PROGRESS NOTES
Occupational Therapy  Facility/Department: [place] [address] [facility]UTE [facility]IT  Rehabilitation Occupational Therapy Daily Treatment Note    Date: 22  Patient Name: Cara Kern       Room: 3103/3103-01  MRN: [id number]  Account: [de-identified]   : 1934  (80 y.o.) Gender: female                    Past Medical History:  has a past medical history of Allergic rhinitis, GERD (gastroesophageal reflux disease), Hyperlipidemia, and Laceration of head. Past Surgical History:   has a past surgical history that includes knee surgery; Appendectomy; fracture surgery (2010); Colonoscopy (); and Forearm surgery (Left, 8/10/2020). Restrictions  Other position/activity restrictions: PWB 25-50% RLE per RN discussion with ortho, up with assist, Keep SBP<180, HOB >30 degrees    Subjective  Subjective: Pt was seated in w/c upon arrival. Pt was pleasant and agreeable to OT                Objective     Cognition  Arousal/Alertness: Delayed responses to stimuli  Following Commands: Follows one step commands with repetition; Follows one step commands with increased time  Attention Span: Difficulty attending to directions  Memory: Decreased short term memory;Decreased recall of recent events;Decreased recall of precautions  Safety Judgement: Decreased awareness of need for assistance;Decreased awareness of need for safety  Problem Solving: Assistance required to generate solutions;Assistance required to identify errors made  Insights: Decreased awareness of deficits  Initiation: Requires cues for all  Sequencing: Requires cues for some  Cognition Comment: Pt demonstrated delayed processing and required + time to respond to commands.  Pt required increased time to initiate and sequence all tasks and she had word finding difficulty  Orientation  Overall Orientation Status: Impaired  Orientation Level: Disoriented to time;Disoriented to situation           ADL  Grooming/Oral Hygiene  Assistance Level: Stand by assist  Skilled Clinical Factors: Pt washed her hands, combed her hair, and brushed her teeth in stance at the sink for ~7 mins w/ CGA progressing to SBA. Pt demonstrated kyphotic posture but did not correct w/ VCs, stating this is her norm. Upper Extremity Dressing  Assistance Level: Set-up; Verbal cues; Increased time to complete  Skilled Clinical Factors: Pt doffed button up shirt and tshirt w/ ++ time needed to sequence task and min VCs for success. Pt donned long sleeve tshirt w/ setup  Lower Extremity Dressing  Assistance Level: Set-up; Verbal cues; Increased time to complete;Minimal assistance  Skilled Clinical Factors: Pt doffed pants from BLEs w/ use of reacher and min A to use reacher successfully. Pt attempted to bend over but c/o R hip pain w/ bending. Pt threaded clean pants onto BLEs w/ use of reacher and required max VCs and ++ time. Pt had mod difficulty comprehending use of reacher initially. CGA needed in stance to manage clothes over hips. Toileting  Assistance Level: Contact guard assist;Verbal cues; Increased time to complete  Skilled Clinical Factors: Pt attempted to void but was unsuccessful. Pt required + time on toilet to attempt. CGA for pants management up and down w/ VCs needed to pull clothes down lower before sitting. Toilet Transfers  Technique:  (ambulating)  Equipment: Raised toilet seat with arms  Additional Factors: Verbal cues  Assistance Level: Contact guard assist  Skilled Clinical Factors: Pt required mod VCs to back up to the RTS completely. Pt demonstrated very slow pace and short shuffled steps to approach the toilet            Functional Mobility  Device: Rolling walker  Activity: To/From bathroom  Assistance Level: Contact guard assist  Skilled Clinical Factors: Pt ambulated w/ RW to/from w/ CGA w/ mod VCs needed for increased WB through BUEs. OT educated pt about 25%-50% WB and why it is important to use BUEs to reduce weight through RLE.  Pt ambulated w/ very slow pace and demonstrating shuffling gait and kyphotic posture. Pt required ++ time for ambulation. Sit to Supine  Assistance Level: Minimal assistance  Skilled Clinical Factors: Min A needed to lift RLE into bed + time needed to complete           Assessment  Assessment  Assessment: Pt demonstrated progress w/ LE dressing today w/ use of a reacher however w/ ++ time and VCs needed to understand novel task. Pt complete all functional mobility w/ RW and CGA however pt continues to require VCs for PWB and for increased weight through BUEs to reduce weight on RLE. Pt is limited by impaired cognition and requires + time to process directions. Pt is motivated and continues to benefit from OT. Cont OT per POC  Activity Tolerance: Patient tolerated treatment well  Discharge Recommendations: 24 hour supervision or assist;Home with Home health OT  OT Equipment Recommendations  Other: Pt reported she has a TTB, RTS, and grab bars however pt is an unreliable historian. Cont to assess for additional needs    Safety Devices  Safety Devices in place: Yes  Type of devices: Bed alarm in place; Left in bed;Call light within reach;Nurse notified    Patient Education  Education  Education Provided: Role of Therapy;Precautions; Safety;ADL Function;Transfer Training  Education Provided Comments: RLE WB precautions; use of reacher for LE dressing  Education Method: Demonstration  Barriers to Learning: Cognition  Education Outcome: Verbalized understanding;Continued education needed    Plan  Plan  Times per Week: 5x a week for 60 mins daily  Times per Day: Daily  Current Treatment Recommendations: Functional mobility training; Endurance training; Safety education & training;Patient/Caregiver education & training;Equipment evaluation, education, & procurement;Self-Care / ADL;Cognitive reorientation;Strengthening;Neuromuscular re-education;Pain management;Home management training    Goals  Patient Goals   Patient goals : \"take care of myself\"  Long Term Goals  Time Frame for Long term goals : 2 weeks  Long Term Goal 1: Pt will complete toileting and toilet transfers MOD I-ongoing  Long Term Goal 2: Pt will complete LE dressing MOD I w/ use of AE prn-ongoing  Long Term Goal 3: Pt will complete bathing tasks w/ spvn-ongoing  Long Term Goal 4: Pt will complete tub transfer w/ spvn-ongoing  Long Term Goal 5: Pt will verbalize 3 fall prevention strategies to increase safety w/ ADLs at home-ongoing    AM-PAC Score               Therapy Time   Individual Concurrent Group Co-treatment   Time In 1245         Time Out 1345         Minutes 60         Timed Code Treatment Minutes: 60 Minutes       Jos Munoz, OT

## 2022-06-09 NOTE — CARE COORDINATION
CM following. Family hopes that pt can return home with [facility], but also open to short-term SNF stay if needed before returning home. Family aware that [facility] may be difficult to staff in pt's area, as it happened in the past.  [facility] liaison is following. Pt stayed at britton and ROSIE's home in [place] to receive therapies, then returned to her home in [place]. They are agreeable to doing this again if needed. CM called [facility] in [place] [phone] to reach pt's local [employer] ([employer] - [facility] of [place] [employer]) to make referral for aide services. Intake staff took pt's info and will contact Geovany VELEZ, but also asked that CM call  to DC. CM will continue to follow for DC needs and recs.         Electronically signed by HUNTER Mandujano, LEANNEW on 6/9/2022 at 2:39 PM

## 2022-06-09 NOTE — PROGRESS NOTES
Department of Physical Medicine & Rehabilitation  Progress Note    Patient Identification:  Mary Patel  [id number]  : 1934  Admit date: 2022    Chief Complaint: Hip fracture, right, closed, initial encounter ([facility] [address]    Subjective:   Pt seen and examined this morning. Resting comfortably with no new complaints. She had an uneventful night. Motivated to get stronger. Planning HHC at home. Urine Cx positive for E. Coli. Team conference today. Denies any fevers, chills, chest pain, palpitations, leg swelling, cough, shortness of breath, difficulty breathing, wheezing, abdominal pain, nausea, vomiting, diarrhea. Objective:  Patient Vitals for the past 24 hrs:   BP Temp Temp src Pulse Resp SpO2   22 0745 133/68 98.1 °F (36.7 °C) Oral 67 17 --   22 2104 136/71 98.2 °F (36.8 °C) Oral 74 16 96 %   22 1804 -- -- -- 87 18 98 %     Const: Alert. No distress, pleasant. HEENT: Normocephalic, atraumatic. Normal sclera/conjunctiva. MMM. CV: Regular rate and rhythm. Resp: No respiratory distress. Lungs CTAB. Abd: Soft, nontender, nondistended, NABS+   Ext: No edema. Neuro: Alert, oriented, appropriately interactive. Psych: Cooperative, appropriate mood and affect    Laboratory data: Available via EMR. Last 24 hour lab  No results found for this or any previous visit (from the past 24 hour(s)). Therapy progress:  PT  Position Activity Restriction  Other position/activity restrictions: PWB 25-50% RLE per RN discussion with ortho, up with assist, Keep SBP<180, HOB >30 degrees  Objective     Sit to Stand: Minimal Assistance,Moderate Assistance (fluctuates fluctuates min<>mod at EOB/wc, min A at raised toilet seat ; all with RW)  Stand to sit: Contact guard assistance,Minimal Assistance (completed at [facility].  min A d/t decreased eccentric control, CGA with increased VC for eccentric control)  Device: Rolling Walker  Assistance: Contact guard assistance,Minimal assistance (CGA with occasional min A required for RW placement)  Distance: 15' x2, small distances in bathroom  OT  PT Equipment Recommendations  Equipment Needed: Yes  Mobility Devices: Lemus : Rolling  Toilet - Technique: Ambulating  Equipment Used: Raised toilet seat with rails  Assessment        SLP          Body mass index is 18.87 kg/m². Assessment and Plan:  1. Acute debilitation secondary to right hip fracture  - Right femoral neck fracture with conservative treatment  - PT/OT     2. Subarachnoid hemorrhage secondary to fall   - CT head showed   - Repeat CT head showed stable bleed  - Neurosurgery signed off as no intervention indicated  - Will need repeat head CT in 2 weeks   - Keppra 500 mg BID   - SBP goal < 180  - PT/OT   - Pain control     3. Close fracture of the neck of the right femoral  - s/p fall   - Orthopedics recommended conservative treatment   - PT/OT    4. UTI  - E. Coli positive on urine culture, foul smell   - avoid Cipro as has allergy to FLQ  - TMP//800mg BID for 5 days    Impairments: Decreased functional mobility, Decreased ADLs     Bladder - high risk retention - Monitor PVRs, SC prn >300cc     Bowel - high risk constipation - colace BID, PRN miralax and MoM. follow bowel movements. Enema or suppository if needed.      Safety - fall precautions     PPx  DVT: SCD  GI: pantoprazole    Rehab Progress: Improving  Anticipated Dispo: home w [facility]  Services/DME: TBD  ELOS: TBD    Team conference was held today on the patient and discussed directly with the patient utilizing their entire treatment team. Please see separate team note for details. Total treatment time for today's care >35 min. >50% of time spent counseling with patient and coordinating care. This patient will be staffed and discussed with Dr. Stacy Borjas,  PGY-3  6/9/2022  9:53 AM      This patient has been seen, examined, and discussed with the resident.  This note has been altered to reflect my own examination findings, impression, and recommendations.      Sariah Larkin D.O. M.P.H  PM&R  6/9/2022  10:19 AM

## 2022-06-09 NOTE — PROGRESS NOTES
Physical Therapy  Facility/Department: [facility] ACUTE REHAB UNIT  Rehabilitation Physical Therapy Treatment Note    NAME: Mildred Marie  : 1934 (80 y.o.)  MRN: [id number]  CODE STATUS: Limited    Date of Service: 22    Restrictions:  Position Activity Restriction  Other position/activity restrictions: PWB 25-50% RLE per RN discussion with ortho, up with assist, Keep SBP<180, HOB >30 degrees     SUBJECTIVE  Subjective  Subjective: Pt seated in recliner upon approach and agreeable to PT. Pain: Pt reports 3/10 L LE hamstring pain seated in recliner at beginning of session- resolves with mobility and hamstring stretching. R hip pain reported (not rated) while sitting on low mat and during sit>stands - resolves with ambulation , RN notified. OBJECTIVE  Cognition  Arousal/Alertness: Delayed responses to stimuli  Following Commands: Follows one step commands with repetition; Follows one step commands with increased time  Attention Span: Difficulty attending to directions  Memory: Decreased short term memory;Decreased recall of recent events;Decreased recall of precautions  Safety Judgement: Decreased awareness of need for assistance;Decreased awareness of need for safety  Problem Solving: Assistance required to generate solutions;Assistance required to identify errors made  Insights: Decreased awareness of deficits  Initiation: Requires cues for all  Sequencing: Requires cues for some  Cognition Comment: Pt demonstrated delayed processing and required + time to respond to commands.  Pt required increased time to initiate and sequence all tasks and she had word finding difficulty  Orientation  Overall Orientation Status: Impaired  Orientation Level: Disoriented to time;Disoriented to situation    Functional Mobility  Supine to Sit  Assistance Level: Stand by assist  Skilled Clinical Factors: VC for sequencing  Scooting  Assistance Level: Stand by assist  Skilled Clinical Factors: increased time and effort to complete, VC for sequencing  Balance  Sitting Balance: Supervision (seated edge of mat with UE support)  Standing Balance: Stand by assistance (SBA static stance with RW, CGA for brief/pants management in standing without UE support)  Transfers  Surface: To chair with arms;From chair with arms;From mat; To mat;Standard toilet  Additional Factors: Verbal cues; Hand placement cues; Increased time to complete; With handrails  Sit to Stand  Assistance Level: Minimal assistance;Contact guard assist (min progressing to CGA)  Stand to Sit  Assistance Level: Contact guard assist;Minimal assistance (fluctuates CGA<>Min, VC for increased eccentric control)  Car Transfer  Assistance Level: Minimal assistance (wc<>car with RW, physical assist required for sit>stand from wc/car, VC for hand placement and sequencing)      Environmental Mobility  Ambulation  Surface: Level surface  Device: Rolling walker  Distance: 75', 165'  Assistance Level: Contact guard assist  Gait Deviations: Decreased step length bilateral;Decreased heel strike right;Decreased heel strike left; Slow torey  Skilled Clinical Factors: Decreased B step height/length (shuffling gait) with improvement in ressponse to VC. Forward flexed posture. VC for positioning hips/feet within RW. VC for upright posture, RW placement, and for precautions. PT Exercises  Exercise Treatment: PT attempts to educate pt on WBing through stepping onto scale and having pt push through UEs to see if in precautions on 2 seperate scales however niether scale updates weight in timely manor/detects weight change. Pt completes stepping on/off scale with CGA and VC for sequencing. A/AROM Exercises: x10 BLE SLR, heelslides and hip abduction. x 15 bridges. 2 x 30 seconds L hamstring stretch using gait belt with pt reporting decreased pain in L hamstring post stretching. VC/tactile cues for technique.       ASSESSMENT/PROGRESS TOWARDS GOALS       Assessment  Assessment: Pt demonstrates improvement in transfers through completion with CGA-min and RW. Pt continues to require VC for precautions during ambulation. Decrease in L hamstring pain in response to stretching. Some R hip pain noted with transfers - RN notified. Increased endurance noted through increased distance ambulated. Pt would benefit from continued skilled PT in order to continue progression of fuinctional mobility and independence. Activity Tolerance: Patient tolerated treatment well  PT Equipment Recommendations  Equipment Needed: Yes  Mobility Devices: Laura Wells: Rolling    Goals  Patient Goals   Patient goals : to go home  Long Term Goals  Time Frame for Long term goals : 2 weeks  Long term goal 1: Pt will complete bed mobility independently  Long term goal 2: Pt will complete sit<>stand transfers with independence  Long term goal 3: Pt will complete 150' with mod I and LRAD  Long term goal 4: Pt will complete ascent/descent of 10' ramp with mod I and LRAD  Long term goal 5: Pt will complete curb step with LRAD and mod I    PLAN OF CARE/SAFETY  Plan  Plan:  (60 min, 5 x a week)  Current Treatment Recommendations: Functional mobility training;Balance training;Transfer training; Therapeutic activities; Patient/Caregiver education & training;Equipment evaluation, education, & procurement;Gait training  Safety Devices  Type of Devices:  (left seated on commode with PCA after being oncontinent of urine at end of session)    EDUCATION  Education  Education Given To: Patient  Education Provided: Role of Therapy;Plan of Care;Precautions; Safety; Mobility Training;Transfer Training;Equipment;Home Exercise Program  Education Provided Comments: Pt able to recall that she is supposed to be putting decreased weight through R LE however requires repeated VC for precautions throughout session.   Education Method: Demonstration;Verbal  Barriers to Learning: Cognition  Education Outcome: Verbalized understanding;Continued education needed        Therapy Time   Individual Concurrent Group Co-treatment   Time In 1100         Time Out 1208         Minutes 68           Timed Code Treatment Minutes: [address] [facility], [place] [id number]

## 2022-06-10 LAB
ANION GAP SERPL CALCULATED.3IONS-SCNC: 5 MMOL/L (ref 3–16)
BASOPHILS ABSOLUTE: 0 K/UL (ref 0–0.2)
BASOPHILS RELATIVE PERCENT: 0.7 %
BUN BLDV-MCNC: 25 MG/DL (ref 7–20)
CALCIUM SERPL-MCNC: 9.2 MG/DL (ref 8.3–10.6)
CHLORIDE BLD-SCNC: 100 MMOL/L (ref 99–110)
CO2: 30 MMOL/L (ref 21–32)
CREAT SERPL-MCNC: 1.2 MG/DL (ref 0.6–1.2)
EOSINOPHILS ABSOLUTE: 0.2 K/UL (ref 0–0.6)
EOSINOPHILS RELATIVE PERCENT: 3.9 %
GFR AFRICAN AMERICAN: 51
GFR NON-AFRICAN AMERICAN: 42
GLUCOSE BLD-MCNC: 96 MG/DL (ref 70–99)
HCT VFR BLD CALC: 35.7 % (ref 36–48)
HEMOGLOBIN: 12.2 G/DL (ref 12–16)
LYMPHOCYTES ABSOLUTE: 1.3 K/UL (ref 1–5.1)
LYMPHOCYTES RELATIVE PERCENT: 22.1 %
MCH RBC QN AUTO: 32.3 PG (ref 26–34)
MCHC RBC AUTO-ENTMCNC: 34.1 G/DL (ref 31–36)
MCV RBC AUTO: 94.8 FL (ref 80–100)
MONOCYTES ABSOLUTE: 0.7 K/UL (ref 0–1.3)
MONOCYTES RELATIVE PERCENT: 11.7 %
NEUTROPHILS ABSOLUTE: 3.5 K/UL (ref 1.7–7.7)
NEUTROPHILS RELATIVE PERCENT: 61.6 %
ORGANISM: ABNORMAL
PDW BLD-RTO: 13 % (ref 12.4–15.4)
PLATELET # BLD: 258 K/UL (ref 135–450)
PMV BLD AUTO: 9 FL (ref 5–10.5)
POTASSIUM REFLEX MAGNESIUM: 4.5 MMOL/L (ref 3.5–5.1)
RBC # BLD: 3.76 M/UL (ref 4–5.2)
SODIUM BLD-SCNC: 135 MMOL/L (ref 136–145)
URINE CULTURE, ROUTINE: ABNORMAL
WBC # BLD: 5.7 K/UL (ref 4–11)

## 2022-06-10 PROCEDURE — 97530 THERAPEUTIC ACTIVITIES: CPT

## 2022-06-10 PROCEDURE — 97535 SELF CARE MNGMENT TRAINING: CPT

## 2022-06-10 PROCEDURE — 80048 BASIC METABOLIC PNL TOTAL CA: CPT

## 2022-06-10 PROCEDURE — 6370000000 HC RX 637 (ALT 250 FOR IP): Performed by: STUDENT IN AN ORGANIZED HEALTH CARE EDUCATION/TRAINING PROGRAM

## 2022-06-10 PROCEDURE — 97116 GAIT TRAINING THERAPY: CPT

## 2022-06-10 PROCEDURE — 99232 SBSQ HOSP IP/OBS MODERATE 35: CPT | Performed by: PHYSICAL MEDICINE & REHABILITATION

## 2022-06-10 PROCEDURE — 6370000000 HC RX 637 (ALT 250 FOR IP): Performed by: PHYSICAL MEDICINE & REHABILITATION

## 2022-06-10 PROCEDURE — 97129 THER IVNTJ 1ST 15 MIN: CPT

## 2022-06-10 PROCEDURE — 85025 COMPLETE CBC W/AUTO DIFF WBC: CPT

## 2022-06-10 PROCEDURE — 36415 COLL VENOUS BLD VENIPUNCTURE: CPT

## 2022-06-10 PROCEDURE — 97130 THER IVNTJ EA ADDL 15 MIN: CPT

## 2022-06-10 PROCEDURE — 1280000000 HC REHAB R&B

## 2022-06-10 RX ADMIN — SULFAMETHOXAZOLE AND TRIMETHOPRIM 1 TABLET: 800; 160 TABLET ORAL at 22:51

## 2022-06-10 RX ADMIN — ACETAMINOPHEN 650 MG: 325 TABLET ORAL at 08:53

## 2022-06-10 RX ADMIN — THERA TABS 1 TABLET: TAB at 08:25

## 2022-06-10 RX ADMIN — PANTOPRAZOLE SODIUM 40 MG: 40 TABLET, DELAYED RELEASE ORAL at 06:25

## 2022-06-10 RX ADMIN — SULFAMETHOXAZOLE AND TRIMETHOPRIM 1 TABLET: 800; 160 TABLET ORAL at 08:25

## 2022-06-10 ASSESSMENT — PAIN SCALES - GENERAL
PAINLEVEL_OUTOF10: 0
PAINLEVEL_OUTOF10: 0
PAINLEVEL_OUTOF10: 7
PAINLEVEL_OUTOF10: 0
PAINLEVEL_OUTOF10: 5

## 2022-06-10 ASSESSMENT — PAIN DESCRIPTION - DESCRIPTORS: DESCRIPTORS: ACHING

## 2022-06-10 ASSESSMENT — PAIN DESCRIPTION - LOCATION: LOCATION: BACK

## 2022-06-10 ASSESSMENT — PAIN DESCRIPTION - FREQUENCY: FREQUENCY: INTERMITTENT

## 2022-06-10 ASSESSMENT — PAIN DESCRIPTION - ONSET: ONSET: SUDDEN

## 2022-06-10 ASSESSMENT — PAIN DESCRIPTION - ORIENTATION: ORIENTATION: MID

## 2022-06-10 ASSESSMENT — PAIN DESCRIPTION - PAIN TYPE: TYPE: ACUTE PAIN

## 2022-06-10 ASSESSMENT — PAIN - FUNCTIONAL ASSESSMENT: PAIN_FUNCTIONAL_ASSESSMENT: ACTIVITIES ARE NOT PREVENTED

## 2022-06-10 NOTE — PROGRESS NOTES
Physical Therapy  Facility/Department: [facility] ACUTE REHAB UNIT  Rehabilitation Physical Therapy Treatment Note    NAME: Raven Gabriel  : 1934 (80 y.o.)  MRN: [id number]  CODE STATUS: Limited    Date of Service: 6/10/22    Restrictions:  Position Activity Restriction  Other position/activity restrictions: PWB 25-50% RLE per RN discussion with ortho, up with assist, Keep SBP<180, HOB >30 degrees, delerium precautions     SUBJECTIVE  Subjective  Subjective: Pt seated in recliner upon approach and agreeable to PT. Pain: Pt denies pain at rest however reports 10/10 pain when completing sit<>stand. RN notified and administers pain meds during session. OBJECTIVE  Cognition  Overall Cognitive Status: Exceptions  Arousal/Alertness: Delayed responses to stimuli  Following Commands: Follows one step commands with repetition; Follows one step commands with increased time  Attention Span: Attends with cues to redirect  Memory: Decreased short term memory;Decreased recall of recent events;Decreased recall of precautions  Safety Judgement: Decreased awareness of need for assistance;Decreased awareness of need for safety  Problem Solving: Assistance required to generate solutions;Assistance required to identify errors made;Assistance required to correct errors made  Insights: Decreased awareness of deficits  Initiation: Requires cues for all  Sequencing: Requires cues for some  Cognition Comment: Pt demonstrated delayed processing and required + time to respond to commands. Pt required increased time to initiate and sequence all tasks.   Orientation  Overall Orientation Status: Impaired  Orientation Level: Oriented to place;Oriented to time;Oriented to person;Disoriented to situation (able to come up with month/year/location with increased time, reports \"because I fell\" when asked why she is in the hospital however is unable to report what injuries she sustained or her precautions)    Functional Mobility  Balance  Standing Balance: Stand by assistance (SBA with RW, CGA without device)  Transfers  Surface: To chair with arms;From chair with arms  Additional Factors: Verbal cues; Hand placement cues; Increased time to complete; With handrails  Sit to Stand  Assistance Level: Minimal assistance; Moderate assistance (fluctuates min<>mod with increased time to complete d/t back pain)  Skilled Clinical Factors: VC for positioning in chair/hand placement  Stand to Sit  Assistance Level: Contact guard assist;Minimal assistance (fluctuates CGA<>Min)  Skilled Clinical Factors: VC for increased eccentric control      Environmental Mobility  Ambulation  Surface: Level surface; Uneven surface; Ramp  Device: Rolling walker  Distance: 20', 150', 100' (including ascent/descent of 79' ramp)  Activity: Within Room; Within Unit  Assistance Level: Contact guard assist;Stand by assist (CGA>SBA with RW on level surface, CGA during ramp ambulaiton)  Gait Deviations: Decreased step length bilateral;Decreased heel strike right;Decreased heel strike left; Slow torey  Skilled Clinical Factors: Decreased B step height/length (shuffling gait) with improvement in ressponse to VC. Forward flexed posture. VC for positioning hips/feet within RW. VC for upright posture, RW placement, and for precautions. On ramp VC for upright posture, RW positioning, and slow controlled pace on descent. PT Exercises  Exercise Treatment: PT plan to complete standingbalnce exercise however pt attempts to stand and refuses d/t increased pain. THerfore seated exercises completed including x10 B LE seated march and LAQ. VC/TC for techinique. ASSESSMENT/PROGRESS TOWARDS GOALS       Assessment  Assessment: Pt requires in creased assistance with transfers and seated scooting this date d/t increased back pain - RN notified and pain meds administered.  Pt demonstrates improvement in ramp ambulation (has ramp to enter/exit home) through compeltion with CGA and RW. Pt would benefit from continued skilled PT i order to progress towards PLOF and independence. Activity Tolerance: Patient tolerated treatment well  PT Equipment Recommendations  Equipment Needed: Yes  Mobility Devices: Lacretia Sheen: Rolling    Goals  Patient Goals   Patient goals : to go home  Long Term Goals  Time Frame for Long term goals : 2 weeks  Long term goal 1: Pt will complete bed mobility independently  Long term goal 2: Pt will complete sit<>stand transfers with independence  Long term goal 3: Pt will complete 150' with mod I and LRAD  Long term goal 4: Pt will complete ascent/descent of 10' ramp with mod I and LRAD  Long term goal 5: Pt will complete curb step with LRAD and mod I    PLAN OF CARE/SAFETY  Plan  Plan:  (60 min, 5 x a week)  Current Treatment Recommendations: Functional mobility training;Balance training;Transfer training; Therapeutic activities; Patient/Caregiver education & training;Equipment evaluation, education, & procurement;Gait training  Safety Devices  Type of Devices: Chair alarm in place;Call light within reach; Left in chair;Nurse notified (left seated on commode with PCA after being oncontinent of urine at end of session)    EDUCATION  Education  Education Given To: Patient  Education Provided: Role of Therapy;Plan of Care;Precautions; Safety; Mobility Training;Transfer Training;Equipment;Home Exercise Program  Education Provided Comments: Requires orientation to injuries/precautions with repeated VC for precautions throughout session.   Education Method: Demonstration;Verbal  Barriers to Learning: Cognition  Education Outcome: Verbalized understanding;Continued education needed        Therapy Time   Individual Concurrent Group Co-treatment   Time In 0830         Time Out 0930         Minutes 60           Timed Code Treatment Minutes: [address], [place], 06/10/22 at 4:01 PM

## 2022-06-10 NOTE — PROGRESS NOTES
Occupational Therapy  Facility/Department: [facility] ACUTE REHAB UNIT  Rehabilitation Occupational Therapy Daily Treatment Note    Date: 6/10/22  Patient Name: Claudeen Lime       Room: 3103/3103-01  MRN: [id number]  Account: [de-identified]   : 1934  (80 y.o.) Gender: female                Past Medical History:  has a past medical history of Allergic rhinitis, GERD (gastroesophageal reflux disease), Hyperlipidemia, and Laceration of head. Past Surgical History:   has a past surgical history that includes knee surgery; Appendectomy; fracture surgery (2010); Colonoscopy (); and Forearm surgery (Left, 8/10/2020). Restrictions  Other position/activity restrictions: PWB 25-50% RLE per RN discussion with ortho, up with assist, Keep SBP<180, HOB >30 degrees, delirium precautions    Subjective  Subjective: Pt was seated in w/c upon arrival. Pt was pleasant and agreeable to OT              Objective     Cognition  Overall Cognitive Status: Exceptions  Arousal/Alertness: Delayed responses to stimuli  Following Commands: Follows one step commands with repetition; Follows one step commands with increased time  Attention Span: Difficulty attending to directions  Memory: Decreased short term memory;Decreased recall of recent events;Decreased recall of precautions  Safety Judgement: Decreased awareness of need for assistance;Decreased awareness of need for safety  Problem Solving: Assistance required to generate solutions;Assistance required to identify errors made;Assistance required to correct errors made  Insights: Decreased awareness of deficits  Initiation: Requires cues for all  Sequencing: Requires cues for some  Cognition Comment: Pt demonstrated delayed processing and required + time to respond to commands. Pt required increased time to initiate and sequence all tasks and she had word finding difficulty.   Orientation  Overall Orientation Status: Impaired  Orientation Level: Disoriented to time (if given options and additional time, pt is able to pick the correct time and place)         ADL  Grooming/Oral Hygiene  Assistance Level: Stand by assist  Skilled Clinical Factors: pt used hand santizer wipes while sitting in w/c  Lower Extremity Dressing  Assistance Level: Set-up; Verbal cues; Increased time to complete;Minimal assistance  Skilled Clinical Factors: Pt doffed hospital pants and brief from BLEs I'ly while seated on toilet. pt usesd reacher to doff socks and required verbal cues for technique. Pt attempted to bend over but c/o R hip pain w/ bending. Pt threaded clean pants and brief onto BLEs w/ use of reacher and min a to thread legs. Pt required mod VCs and ++ time. CGA needed in stance to manage clothes over hips. Putting On/Taking Off Footwear  Equipment Provided: Sock aid  Assistance Level: Moderate assistance; Increased time to complete  Skilled Clinical Factors: pt educated on use of sock aid and LH SH. pt verb understanding. demonstrated on L foot and pt attempted on R. Pt able to place foot in sock aid but unable to remove the sock from device (sock appeared to be too tight for device). pt had proper technique but had limited strength to complete task. Toileting  Assistance Level: Contact guard assist;Verbal cues; Increased time to complete  Skilled Clinical Factors: Pt voided successfully. Pt required + time on toilet. CGA for balance during pericare and VCs needed for clothing management  Toilet Transfers  Technique:  (ambulating w/ RW)  Equipment: Raised toilet seat with arms  Additional Factors: Verbal cues  Assistance Level: Contact guard assist;Minimal assistance  Skilled Clinical Factors: Pt required mod VCs to back up to the RTS completely. Pt demonstrated very slow pace. pt required min a to stand from toilet.        Functional Mobility  Device: Rolling walker  Activity: To/From bathroom  Assistance Level: Contact guard assist  Skilled Clinical Factors: Pt ambulated w/ RW to/from w/ CGA w/ mod VCs needed for increased WB through [id number] and for direction at times. OT educated pt about 25%-50% WB. Pt ambulated w/ very slow pace and demonstrating shuffling gait and kyphotic posture. Pt required ++ time for ambulation. Sit to Stand  Assistance Level: Minimal assistance;Contact guard assist  Skilled Clinical Factors: CGA-Min A from w/c w/ min VCs needed for ant weight shift  Stand to Sit  Assistance Level: Contact guard assist  Skilled Clinical Factors: VCs needed to reach back for chair     OT Exercises  Dynamic Standing Balance Exercises: pt completed gardening task in stance to promote increased activity tolerance, improve dynamic standing balance and recall/sequence directions for a familiar task/(I)adls. pt looked at picture of a flower arrangement and recalled steps needed to recreate picture/\"plant a flower\". pt able to find materials and create the flower arrangement w/ verbal cues and increased time. Pt in stance for 5 min w/ SBA. pt able to ambulate w/ CGA and place flower on counter. Assessment  Assessment  Assessment: pt demonstrated progress w/ LE dressing today w/ use of reacher. pt required mod a for putting on footwear w/ sock aid and LH SH however this task was novel to pt. pt completed all functional mobility w/ RW and CGA. pt able to complete toileting w/ CGA. pt able to complete gardening task in stance for 5 min. Pt continues to be limited by impaired cognition and requires + time to process directions. Pt is motivated and continues to benefit from OT. Cont OT per POC  Activity Tolerance: Patient tolerated treatment well  Discharge Recommendations: 24 hour supervision or assist;Home with Home health OT  OT Equipment Recommendations  Other: Pt reported she has a TTB, RTS, and grab bars however pt is an unreliable historian. Cont to assess for additional needs  Safety Devices  Safety Devices in place: Yes  Type of devices: Left in chair;Call light within reach; Chair alarm in place;Nurse notified    Patient Education  Education  Education Given To: Patient  Education Provided: Role of Therapy;Precautions; Safety;ADL Function;Transfer Training  Education Provided Comments: RLE WB precautions; use of sock aid and LH SH for putting on footwear  Education Method: Demonstration;Verbal  Barriers to Learning: Cognition  Education Outcome: Verbalized understanding;Continued education needed    Plan  Plan  Times per Week: 5x a week for 60 mins daily  Times per Day: Daily  Current Treatment Recommendations: Functional mobility training; Endurance training; Safety education & training;Patient/Caregiver education & training;Equipment evaluation, education, & procurement;Self-Care / ADL;Cognitive reorientation;Strengthening;Neuromuscular re-education;Pain management;Home management training    Goals  Long Term Goals  Time Frame for Long term goals : 2 weeks  Long Term Goal 1: Pt will complete toileting and toilet transfers MOD I-ongoing  Long Term Goal 2: Pt will complete LE dressing MOD I w/ use of AE prn-ongoing  Long Term Goal 3: Pt will complete bathing tasks w/ spvn-ongoing  Long Term Goal 4: Pt will complete tub transfer w/ spvn-ongoing  Long Term Goal 5: Pt will verbalize 3 fall prevention strategies to increase safety w/ ADLs at home-ongoing    AM-PAC Score           Therapy Time   Individual Concurrent Group Co-treatment   Time In 1100         Time Out 1200         Minutes 60           Time Code Treatment Minutes: 60 Minutes        [place] Juanito S/OT

## 2022-06-10 NOTE — PROGRESS NOTES
Patient is c/o of back pain with ambulation tylenol given. Patient is on fall precautions call light and bedside table are within reach. Patient is aware to call for assistance to transfer. Inocente

## 2022-06-10 NOTE — PROGRESS NOTES
Speech Language Pathology  ACUTE REHAB UNIT  SPEECH/LANGUAGE PATHOLOGY      [] Daily  [] Weekly Care Conference Note  [] Discharge    [place] Car      :1934  ZXK:[id number]  Rehab Dx/Hx: Hip fracture, right, closed, initial encounter ([facility] [address] [S72.001A]    Precautions: [] Aspiration  [x] Fall risk  [] Sternal  [] Seizure [] Hip  [] Weight Bearing [] Other     ST Dx: [] Aphasia  [] Dysarthria  [] Apraxia   [] Oropharyngeal dysphagia [x] Cognitive Impairment  [] Other:   Date of Admit: 2022  Room #: 3103/3103-01  Date: 6/10/2022          Current Diet Order:ADULT DIET; Regular  ADULT ORAL NUTRITION SUPPLEMENT; Breakfast, Dinner; Standard High Calorie/High Protein Oral Supplement  ADULT ORAL NUTRITION SUPPLEMENT; Lunch; Frozen Oral Supplement            Comments: Pt presented to [facility] ED on  secondary to a fall to the head. CT findings showed a subarachnoid hemmorrhage. Pt was transferred to [facility] for a neurology consult and was admitted for R hip fracture and SAH. While admitted, pt received SLP services for cognitive-linguistic impairment, mild aphasia, and dysphagia. Pt was dismissed from SLP services for dysphagia on . Pt has hx of HTN, lacunar stroke in cerebellum, basal ganglia, and L thalamus in 2019, and baseline confusion. MRI reports from 2019 also stated that findings are consistent with dementia. Per chart review, pt has also reported several falls in the last year or so. Chart review also noted that hx provided by pt may not be reliable. Subjective: Pt resting in chair upon entry with meal tray in front of her. Easily roused and agreeable to evaluation. Lives With: Alone  Homemaking Responsibilities: Yes  Education: graduated HS  Occupation: Retired  Type of Occupation: worked for [employer] for 40 years- couldn't recall role, but stated \"if they needed help with anything, I helped. I did anything to help them. \"  Leisure & Hobbies: spend time with 2 kids (son and a close family friend) and grandchildren- take them somewhere or at home; watch TV       Vision  Vision: Impaired  Vision Exceptions: Wears glasses at all times (glasses not at hospital)  Hearing  Hearing: Within functional limits  Hearing Exceptions: Hard of hearing/hearing concerns  Barriers toward progress: Confusion, Cognitive deficit, Limited safety awareness and Limited insight into deficits        Date: 6/10/2022      Tx session 1 Tx session 2   Total Timed Code Min 30 41   Total Treatment Minutes 30 41   Individual Treatment Minutes 30 41   Group Treatment Minutes 0 0   Co-Treat Minutes 0 0   Brief Exception: NA NA   Pain None indicated None Indicated   Pain Intervention: [] RN notified  [] Repositioned  [] Intervention offered and patient declined  [x] N/A  [] Other: [] RN notified  [] Repositioned  [] Intervention offered and patient declined  [x] N/A  [] Other:   Subjective:     Pt seated in wheel chair upon entry with PT at side finishing her session. Stated that she slept a little bit last night, but not much. Agreeable and participatory. Pt seated in wheelchair upon entry having a phone conversation; did not appropriately end conversation and continued for 5+ minutes. Pt ended phone conversation when SLP said it was time for speech therapy and the communication partner ended the phone conversation. Agreeable to session, despite initial confusion why SLP was in room. Pt also appeared confused which discipline SLP was even after discussing it was time for speech therapy. At end of session, pt initiated a conversation with SLP about gardening and appeared to be confused why SLP was leaving evidenced by, \"Where are you going? Why are you leaving? \". Objective / Goals:     Pt will complete alternating attention tasks with 80% accuracy given min cues. Goal not targeted this session. Goal not targeted this session. Pt will complete graded naming tasks with 80% accuracy given min cues.     Goal not directly targeted this session. Goal not targeted this session. Pt will complete graded memory tasks with 80% accuracy given mod cues. Visual matching with picture association: achieved 60% accuracy. Pt with limited recall of associations. Pt with limited recall of safety awareness from PT: able to recall to push down with arms, but no recall of use of walker. Required mod to MAX cues for orientation to date, despite being cued to use external memory tools. Pt independently attended to clock for time. Visual matching with picture association: achieved 100% accuracy. Pt required increased amount of time to match pictures and mod cues to develop associations between the pictures. 40% accuracy for recalling the associations. Following 2-step written/oral directions: Pt consistently recalled the 2nd step given in each set of directions. Independently would attend to the written directions, but had difficulty executing the task. See below for more information. Pt unable to recall personal home address. Pt will complete graded tasks with adequate self-monitoring and self-correcting with 80% accuracy indepenently. Reduced self-monitoring during MELCHOR task evidenced by errors made with no awareness or attempt to self-correct. No evidence of self-monitoring or attempts to self-correct during MELCHOR task and following 2-step directions task. Following 2-step written/oral directions: 33% accuracy achieved given MAX cues. Pt repeated the directions correctly, but then continued to complete the direction from the 1st set of directions given. Model required to complete remaining sets of directions. Pt will participate in ongoing cognitive-linguistic assessment.         Patient was administered portions of the MELCHOR (Assessment of Language-Related Functional Activities) with the following results:  Counting Money: 50% achieved (3/6) in 9 minutes and 11 seconds; on 7th problem, SLP began therapeutic treatment vs. Assessment. Pt required max cues for 2 problems for organization- pt able to state verbally what needs to be completed to solve problems, but unable to accurately solve when using stimulus. MAX cues to encourage use of visual aids to assist in money calculations. Patient was administered portions of the MELCHOR (Assessment of Language-Related Functional Activities) with the following results:  Counting Money: completed this task as therapeutic intervention v. Assessment. During activity, pt stated, \"My brain just isn't thinking. I don't know why I feel like this. \" Pt required MAX cues for remaining 2 problems, specifically for organization of coins, working memory, and for mathematical calculation. Overall, pt achieved 30% accuracy for the 'Counting Money' task, which this correlates with an independent function rating of 3 which suggests a high probability of inability to function independently with this type of task. Other areas targeted:     Education:   Pt educated to rationale for tx tasks and skills targeted. Also, provided education to external memory tools for recalling date, time, and events from the day. Education provided for safety awareness. Safety Devices: [x] Call light within reach  [x] Chair alarm activated and connected to nurse call light system  [] Bed alarm activated   [x] Other: reinforced use of call light [x] Call light within reach  [] Chair alarm activated and connected to nurse call light system  [x] Bed alarm activated  [x] Other: reinforced use of call light   Assessment: Cognitive impairment characterized by reduced executive function skills (memory, alternating attention, self-monitoring/correcting). Pt also with reduced safety awareness due to forgetfulness of required equipment to safely ambulate, inconsistent orientation, and inconsistent recall of personal information. Per dtr in law on 6/9/22, pt seems to be at baseline cognitively.     Plan: Continue as per plan of care.       Interventions used this date:  [] Speech/Language Treatment  [] Instruction in HEP  [] Dysphagia Treatment [x] Cognitive Treatment   [] Other:    Discharge recommendations:  [] Home independently  [] Home with assistance [x]  24 hour supervision  [] ECF [x] Other: assistance with financial and medication management  Continued Tx Upon Discharge: ? [] Yes    [] No    [x] TBD based on progress while on ARU     [] Vital Stim indicated     [] Other:   Estimated discharge date: Not yet established      Electronically signed by:  Juliet Kenney MA, CF-SLP  [id number]  Speech-Language Pathologist

## 2022-06-10 NOTE — PROGRESS NOTES
Department of Physical Medicine & Rehabilitation  Progress Note    Patient Identification:  James Patsudhakar  [id number]  : 1934  Admit date: 2022    Chief Complaint: Hip fracture, right, closed, initial encounter ([facility] [address]    Subjective:   Pt seen and examined this morning. Resting comfortably with no new complaints. Slight bump in Cr from the Bactrim for  UTI. Renal function stable. Working with therapy. Denies any fevers, chills, chest pain, palpitations, leg swelling, cough, shortness of breath, difficulty breathing, wheezing, abdominal pain, nausea, vomiting, diarrhea. Objective:  Patient Vitals for the past 24 hrs:   BP Temp Temp src Pulse Resp SpO2   06/10/22 0815 (!) 102/49 -- Oral 81 17 97 %   22 2106 139/66 98.4 °F (36.9 °C) Oral 72 18 96 %   22 1519 -- -- -- 74 16 97 %     Const: Alert. No distress, pleasant. HEENT: Normocephalic, atraumatic. Normal sclera/conjunctiva. MMM. CV: Regular rate and rhythm. Resp: No respiratory distress. Lungs CTAB. Abd: Soft, nontender, nondistended, NABS+   Ext: No edema. Neuro: Alert, oriented, appropriately interactive. Psych: Cooperative, appropriate mood and affect    Laboratory data: Available via EMR.    Last 24 hour lab  Recent Results (from the past 24 hour(s))   Basic Metabolic Panel w/ Reflex to MG    Collection Time: 06/10/22  6:57 AM   Result Value Ref Range    Sodium 135 (L) 136 - 145 mmol/L    Potassium reflex Magnesium 4.5 3.5 - 5.1 mmol/L    Chloride 100 99 - 110 mmol/L    CO2 30 21 - 32 mmol/L    Anion Gap 5 3 - 16    Glucose 96 70 - 99 mg/dL    BUN 25 (H) 7 - 20 mg/dL    CREATININE 1.2 0.6 - 1.2 mg/dL    GFR Non-African American 42 (A) >60    GFR  51 (A) >60    Calcium 9.2 8.3 - 10.6 mg/dL   CBC auto differential    Collection Time: 06/10/22  6:57 AM   Result Value Ref Range    WBC 5.7 4.0 - 11.0 K/uL    RBC 3.76 (L) 4.00 - 5.20 M/uL    Hemoglobin 12.2 12.0 - 16.0 g/dL    Hematocrit 35.7 (L) 36.0 - 48.0 % MCV 94.8 80.0 - 100.0 fL    MCH 32.3 26.0 - 34.0 pg    MCHC 34.1 31.0 - 36.0 g/dL    RDW 13.0 12.4 - 15.4 %    Platelets 525 802 - 050 K/uL    MPV 9.0 5.0 - 10.5 fL    Neutrophils % 61.6 %    Lymphocytes % 22.1 %    Monocytes % 11.7 %    Eosinophils % 3.9 %    Basophils % 0.7 %    Neutrophils Absolute 3.5 1.7 - 7.7 K/uL    Lymphocytes Absolute 1.3 1.0 - 5.1 K/uL    Monocytes Absolute 0.7 0.0 - 1.3 K/uL    Eosinophils Absolute 0.2 0.0 - 0.6 K/uL    Basophils Absolute 0.0 0.0 - 0.2 K/uL       Therapy progress:  PT  Position Activity Restriction  Other position/activity restrictions: PWB 25-50% RLE per RN discussion with ortho, up with assist, Keep SBP<180, HOB >30 degrees  Objective     Sit to Stand: Minimal Assistance,Moderate Assistance (fluctuates fluctuates min<>mod at EOB/wc, min A at raised toilet seat ; all with RW)  Stand to sit: Contact guard assistance,Minimal Assistance (completed at [facility]. min A d/t decreased eccentric control, CGA with increased VC for eccentric control)  Device: Rolling Walker  Assistance: Contact guard assistance,Minimal assistance (CGA with occasional min A required for RW placement)  Distance: 15' x2, small distances in bathroom  OT  PT Equipment Recommendations  Equipment Needed: Yes  Mobility Devices: Jefferson Ganser: Rolling  Toilet - Technique: Ambulating  Equipment Used: Raised toilet seat with rails  Assessment        SLP          Body mass index is 18.87 kg/m². Assessment and Plan:  1.  Acute debilitation secondary to right hip fracture  - Right femoral neck fracture with conservative treatment  - PT/OT     2. Subarachnoid hemorrhage secondary to fall   - CT head showed   - Repeat CT head showed stable bleed  - Neurosurgery signed off as no intervention indicated  - Will need repeat head CT in 2 weeks   - Keppra 500 mg BID   - SBP goal < 180  - PT/OT   - Pain control     3. Close fracture of the neck of the right femoral  - s/p fall   - Orthopedics recommended conservative treatment   - PT/OT    4. UTI  - E. Coli positive on urine culture, foul smell   - avoid Cipro as has allergy to FLQ  - TMP//800mg BID for 5 days    Impairments: Decreased functional mobility, Decreased ADLs     Bladder - high risk retention - Monitor PVRs, SC prn >300cc     Bowel - high risk constipation - colace BID, PRN miralax and MoM. follow bowel movements. Enema or suppository if needed.      Safety - fall precautions     PPx  DVT: SCD  GI: pantoprazole    Rehab Progress: Improving  Anticipated Dispo: home w [facility]  Services/DME: TBD  ELOS: 6/19      This patient will be staffed and discussed with DO Bernabe Bonilla DO PGY-3  6/10/2022  9:34 AM      This patient has been seen, examined, and discussed with the resident. This note has been altered to reflect my own examination findings, impression, and recommendations.      Hernan Ramsay D.O. MSolangeP.H  PM&R  6/10/2022  9:37 AM

## 2022-06-11 ENCOUNTER — APPOINTMENT (OUTPATIENT)
Dept: CT IMAGING | Age: 87
DRG: 560 | End: 2022-06-11
Attending: PHYSICAL MEDICINE & REHABILITATION
Payer: MEDICARE

## 2022-06-11 ENCOUNTER — HOSPITAL ENCOUNTER (INPATIENT)
Age: 87
LOS: 3 days | Discharge: HOSPICE | DRG: 064 | End: 2022-06-14
Attending: INTERNAL MEDICINE | Admitting: INTERNAL MEDICINE
Payer: MEDICARE

## 2022-06-11 VITALS
TEMPERATURE: 98.1 F | DIASTOLIC BLOOD PRESSURE: 68 MMHG | OXYGEN SATURATION: 94 % | WEIGHT: 124.12 LBS | BODY MASS INDEX: 18.81 KG/M2 | HEART RATE: 76 BPM | RESPIRATION RATE: 18 BRPM | SYSTOLIC BLOOD PRESSURE: 137 MMHG | HEIGHT: 68 IN

## 2022-06-11 PROBLEM — G93.41 ACUTE METABOLIC ENCEPHALOPATHY: Status: ACTIVE | Noted: 2022-06-11

## 2022-06-11 PROBLEM — G93.41 METABOLIC ENCEPHALOPATHY: Status: ACTIVE | Noted: 2022-06-11

## 2022-06-11 PROBLEM — R56.9 SEIZURE (HCC): Status: ACTIVE | Noted: 2022-06-11

## 2022-06-11 LAB
ANION GAP SERPL CALCULATED.3IONS-SCNC: 11 MMOL/L (ref 3–16)
BASOPHILS ABSOLUTE: 0 K/UL (ref 0–0.2)
BASOPHILS RELATIVE PERCENT: 0.5 %
BUN BLDV-MCNC: 33 MG/DL (ref 7–20)
CALCIUM SERPL-MCNC: 8.9 MG/DL (ref 8.3–10.6)
CHLORIDE BLD-SCNC: 98 MMOL/L (ref 99–110)
CO2: 25 MMOL/L (ref 21–32)
CREAT SERPL-MCNC: 1.3 MG/DL (ref 0.6–1.2)
EOSINOPHILS ABSOLUTE: 0.2 K/UL (ref 0–0.6)
EOSINOPHILS RELATIVE PERCENT: 2.9 %
GFR AFRICAN AMERICAN: 47
GFR NON-AFRICAN AMERICAN: 39
GLUCOSE BLD-MCNC: 89 MG/DL (ref 70–99)
GLUCOSE BLD-MCNC: 93 MG/DL (ref 70–99)
HCT VFR BLD CALC: 34.5 % (ref 36–48)
HEMOGLOBIN: 11.5 G/DL (ref 12–16)
INR BLD: 1.11 (ref 0.87–1.14)
LACTIC ACID: 1 MMOL/L (ref 0.4–2)
LYMPHOCYTES ABSOLUTE: 1.6 K/UL (ref 1–5.1)
LYMPHOCYTES RELATIVE PERCENT: 25.2 %
MAGNESIUM: 2.3 MG/DL (ref 1.8–2.4)
MCH RBC QN AUTO: 32 PG (ref 26–34)
MCHC RBC AUTO-ENTMCNC: 33.3 G/DL (ref 31–36)
MCV RBC AUTO: 95.9 FL (ref 80–100)
MONOCYTES ABSOLUTE: 0.7 K/UL (ref 0–1.3)
MONOCYTES RELATIVE PERCENT: 11.5 %
NEUTROPHILS ABSOLUTE: 3.8 K/UL (ref 1.7–7.7)
NEUTROPHILS RELATIVE PERCENT: 59.9 %
PDW BLD-RTO: 13 % (ref 12.4–15.4)
PERFORMED ON: NORMAL
PLATELET # BLD: 262 K/UL (ref 135–450)
PMV BLD AUTO: 8.8 FL (ref 5–10.5)
POTASSIUM SERPL-SCNC: 4.8 MMOL/L (ref 3.5–5.1)
PROTHROMBIN TIME: 14.2 SEC (ref 11.7–14.5)
RBC # BLD: 3.6 M/UL (ref 4–5.2)
SODIUM BLD-SCNC: 134 MMOL/L (ref 136–145)
WBC # BLD: 6.3 K/UL (ref 4–11)

## 2022-06-11 PROCEDURE — 83605 ASSAY OF LACTIC ACID: CPT

## 2022-06-11 PROCEDURE — 6370000000 HC RX 637 (ALT 250 FOR IP): Performed by: STUDENT IN AN ORGANIZED HEALTH CARE EDUCATION/TRAINING PROGRAM

## 2022-06-11 PROCEDURE — 36415 COLL VENOUS BLD VENIPUNCTURE: CPT

## 2022-06-11 PROCEDURE — 70450 CT HEAD/BRAIN W/O DYE: CPT

## 2022-06-11 PROCEDURE — 80048 BASIC METABOLIC PNL TOTAL CA: CPT

## 2022-06-11 PROCEDURE — 2580000003 HC RX 258: Performed by: INTERNAL MEDICINE

## 2022-06-11 PROCEDURE — 70498 CT ANGIOGRAPHY NECK: CPT

## 2022-06-11 PROCEDURE — 4A03X5D MEASUREMENT OF ARTERIAL FLOW, INTRACRANIAL, EXTERNAL APPROACH: ICD-10-PCS | Performed by: RADIOLOGY

## 2022-06-11 PROCEDURE — 6360000002 HC RX W HCPCS: Performed by: INTERNAL MEDICINE

## 2022-06-11 PROCEDURE — 83735 ASSAY OF MAGNESIUM: CPT

## 2022-06-11 PROCEDURE — 51798 US URINE CAPACITY MEASURE: CPT

## 2022-06-11 PROCEDURE — 85025 COMPLETE CBC W/AUTO DIFF WBC: CPT

## 2022-06-11 PROCEDURE — 99239 HOSP IP/OBS DSCHRG MGMT >30: CPT | Performed by: PHYSICAL MEDICINE & REHABILITATION

## 2022-06-11 PROCEDURE — 85610 PROTHROMBIN TIME: CPT

## 2022-06-11 PROCEDURE — 2060000000 HC ICU INTERMEDIATE R&B

## 2022-06-11 PROCEDURE — 6370000000 HC RX 637 (ALT 250 FOR IP): Performed by: PHYSICAL MEDICINE & REHABILITATION

## 2022-06-11 PROCEDURE — 51701 INSERT BLADDER CATHETER: CPT

## 2022-06-11 PROCEDURE — 6360000004 HC RX CONTRAST MEDICATION: Performed by: PHYSICAL MEDICINE & REHABILITATION

## 2022-06-11 RX ORDER — LEVETIRACETAM 500 MG/1
500 TABLET ORAL 2 TIMES DAILY
Status: DISCONTINUED | OUTPATIENT
Start: 2022-06-11 | End: 2022-06-11

## 2022-06-11 RX ORDER — ACETAMINOPHEN 325 MG/1
650 TABLET ORAL EVERY 6 HOURS PRN
Status: DISCONTINUED | OUTPATIENT
Start: 2022-06-11 | End: 2022-06-14 | Stop reason: HOSPADM

## 2022-06-11 RX ORDER — SODIUM CHLORIDE 0.9 % (FLUSH) 0.9 %
5-40 SYRINGE (ML) INJECTION PRN
Status: DISCONTINUED | OUTPATIENT
Start: 2022-06-11 | End: 2022-06-11 | Stop reason: HOSPADM

## 2022-06-11 RX ORDER — SODIUM CHLORIDE 9 MG/ML
INJECTION, SOLUTION INTRAVENOUS PRN
Status: DISCONTINUED | OUTPATIENT
Start: 2022-06-11 | End: 2022-06-11 | Stop reason: HOSPADM

## 2022-06-11 RX ORDER — ONDANSETRON 2 MG/ML
4 INJECTION INTRAMUSCULAR; INTRAVENOUS EVERY 6 HOURS PRN
Status: DISCONTINUED | OUTPATIENT
Start: 2022-06-11 | End: 2022-06-14 | Stop reason: HOSPADM

## 2022-06-11 RX ORDER — SODIUM CHLORIDE 0.9 % (FLUSH) 0.9 %
5-40 SYRINGE (ML) INJECTION EVERY 12 HOURS SCHEDULED
Status: DISCONTINUED | OUTPATIENT
Start: 2022-06-11 | End: 2022-06-14 | Stop reason: HOSPADM

## 2022-06-11 RX ORDER — ACETAMINOPHEN 650 MG/1
650 SUPPOSITORY RECTAL EVERY 6 HOURS PRN
Status: DISCONTINUED | OUTPATIENT
Start: 2022-06-11 | End: 2022-06-14 | Stop reason: HOSPADM

## 2022-06-11 RX ORDER — ONDANSETRON 4 MG/1
4 TABLET, ORALLY DISINTEGRATING ORAL EVERY 8 HOURS PRN
Status: DISCONTINUED | OUTPATIENT
Start: 2022-06-11 | End: 2022-06-14 | Stop reason: HOSPADM

## 2022-06-11 RX ORDER — SODIUM CHLORIDE 9 MG/ML
INJECTION, SOLUTION INTRAVENOUS CONTINUOUS
Status: DISCONTINUED | OUTPATIENT
Start: 2022-06-11 | End: 2022-06-11 | Stop reason: HOSPADM

## 2022-06-11 RX ORDER — ACETAMINOPHEN 325 MG/1
650 TABLET ORAL EVERY 6 HOURS PRN
Status: DISCONTINUED | OUTPATIENT
Start: 2022-06-11 | End: 2022-06-11 | Stop reason: HOSPADM

## 2022-06-11 RX ORDER — SODIUM CHLORIDE 9 MG/ML
INJECTION, SOLUTION INTRAVENOUS CONTINUOUS
Status: DISCONTINUED | OUTPATIENT
Start: 2022-06-11 | End: 2022-06-14 | Stop reason: HOSPADM

## 2022-06-11 RX ORDER — SODIUM CHLORIDE 9 MG/ML
INJECTION, SOLUTION INTRAVENOUS PRN
Status: DISCONTINUED | OUTPATIENT
Start: 2022-06-11 | End: 2022-06-14 | Stop reason: HOSPADM

## 2022-06-11 RX ORDER — ACETAMINOPHEN 650 MG/1
650 SUPPOSITORY RECTAL EVERY 6 HOURS PRN
Status: DISCONTINUED | OUTPATIENT
Start: 2022-06-11 | End: 2022-06-11 | Stop reason: HOSPADM

## 2022-06-11 RX ORDER — SODIUM CHLORIDE 0.9 % (FLUSH) 0.9 %
5-40 SYRINGE (ML) INJECTION PRN
Status: DISCONTINUED | OUTPATIENT
Start: 2022-06-11 | End: 2022-06-14 | Stop reason: HOSPADM

## 2022-06-11 RX ORDER — SODIUM CHLORIDE 0.9 % (FLUSH) 0.9 %
5-40 SYRINGE (ML) INJECTION EVERY 12 HOURS SCHEDULED
Status: DISCONTINUED | OUTPATIENT
Start: 2022-06-11 | End: 2022-06-11 | Stop reason: HOSPADM

## 2022-06-11 RX ORDER — POLYETHYLENE GLYCOL 3350 17 G/17G
17 POWDER, FOR SOLUTION ORAL DAILY PRN
Status: DISCONTINUED | OUTPATIENT
Start: 2022-06-11 | End: 2022-06-14 | Stop reason: HOSPADM

## 2022-06-11 RX ADMIN — ACETAMINOPHEN 650 MG: 325 TABLET ORAL at 11:42

## 2022-06-11 RX ADMIN — SULFAMETHOXAZOLE AND TRIMETHOPRIM 1 TABLET: 800; 160 TABLET ORAL at 08:56

## 2022-06-11 RX ADMIN — SODIUM CHLORIDE: 9 INJECTION, SOLUTION INTRAVENOUS at 21:30

## 2022-06-11 RX ADMIN — PANTOPRAZOLE SODIUM 40 MG: 40 TABLET, DELAYED RELEASE ORAL at 07:08

## 2022-06-11 RX ADMIN — IOPAMIDOL 70 ML: 755 INJECTION, SOLUTION INTRAVENOUS at 17:22

## 2022-06-11 RX ADMIN — THERA TABS 1 TABLET: TAB at 08:56

## 2022-06-11 RX ADMIN — CEFTRIAXONE 1000 MG: 1 INJECTION, POWDER, FOR SOLUTION INTRAMUSCULAR; INTRAVENOUS at 21:42

## 2022-06-11 RX ADMIN — SODIUM CHLORIDE, PRESERVATIVE FREE 10 ML: 5 INJECTION INTRAVENOUS at 21:51

## 2022-06-11 RX ADMIN — LEVETIRACETAM 500 MG: 100 INJECTION, SOLUTION, CONCENTRATE INTRAVENOUS at 22:19

## 2022-06-11 ASSESSMENT — PAIN SCALES - GENERAL
PAINLEVEL_OUTOF10: 0
PAINLEVEL_OUTOF10: 5
PAINLEVEL_OUTOF10: 0
PAINLEVEL_OUTOF10: 3
PAINLEVEL_OUTOF10: 0

## 2022-06-11 ASSESSMENT — PAIN DESCRIPTION - DESCRIPTORS: DESCRIPTORS: ACHING

## 2022-06-11 ASSESSMENT — PAIN DESCRIPTION - LOCATION: LOCATION: HIP

## 2022-06-11 ASSESSMENT — PAIN - FUNCTIONAL ASSESSMENT: PAIN_FUNCTIONAL_ASSESSMENT: ACTIVITIES ARE NOT PREVENTED

## 2022-06-11 ASSESSMENT — PAIN DESCRIPTION - ORIENTATION: ORIENTATION: LEFT

## 2022-06-11 NOTE — H&P
Hospital Medicine History & Physical      PCP: Michael Rick MD    Date of Admission: 6/11/2022    Date of Service: Pt seen/examined on 6/11/2022 and Admitted to Inpatient with expected LOS greater than two midnights due to medical therapy. Chief Complaint:  confusion      History Of Present Illness: Patient is 80-year-old female with history of frequent falls, hypertension, history of CVA, recent hospitalization after a fall where she was noted to have ICH/SDH also noted to have closed fracture of neck of right femoral.  Was transferred to ARU for rehab. Response was called at 5 PM as patient was not able to speak or follow any commands. Last known well was 12PM before patient ate her lunch. Per bedside RN patient took a nap after lunch and at dinnertime patient was confused. Prior to this patient was alert oriented x3. Talking to family over the phone. Code stroke was called. CT head showed slightly worsening of subdural hematoma. Past Medical History:          Diagnosis Date    Allergic rhinitis     GERD (gastroesophageal reflux disease)     Hyperlipidemia     Laceration of head 06/23/2016       Past Surgical History:          Procedure Laterality Date    APPENDECTOMY      COLONOSCOPY  4-2011    FOREARM SURGERY Left 8/10/2020    OPEN REDUCTION INTERNAL FIXATION LEFT DISTAL RADIUS with mini c-arm performed by Lore Moncada MD at [address] [address]  8/2010    left Ulna    KNEE SURGERY      left torn meniscus       Medications Prior to Admission:      Prior to Admission medications    Medication Sig Start Date End Date Taking? Authorizing Provider   levETIRAcetam (KEPPRA) 500 MG tablet Take 1 tablet by mouth 2 times daily for 14 days 6/2/22 6/16/22  Gabi Nava MD   Calcium-Phosphorus-Vitamin D (CALCIUM GUMMIES PO) Take by mouth    Historical Provider, MD       Allergies:  Levofloxacin and Atorvastatin    Social History:      The patient currently lives home.     TOBACCO: reports that she has never smoked. She has quit using smokeless tobacco.  ETOH:   reports no history of alcohol use. Family History:       Reviewed in detail and negative for DM, CAD, Cancer, CVA. Positive as follows:        Problem Relation Age of Onset    Arthritis Mother     Heart Attack Father        REVIEW OF SYSTEMS:   Pertinent positives as noted in the HPI. All other systems reviewed and negative. PHYSICAL EXAM PERFORMED:    BP (!) 145/62   Pulse 73   Temp 97.4 °F (36.3 °C) (Axillary)   Resp 19   Ht 5' 8\" (1.727 m)   Wt 123 lb 7.3 oz (56 kg)   SpO2 98%   BMI 18.77 kg/m²     General appearance: Early, confused, appears stated age and cooperative. HEENT:  Normal cephalic, atraumatic without obvious deformity. Pupils equal, round, and reactive to light. Extra ocular muscles intact. Conjunctivae/corneas clear. Neck: Supple, with full range of motion. No jugular venous distention. Trachea midline. Respiratory:  Normal respiratory effort. Clear to auscultation, bilaterally without Rales/Wheezes/Rhonchi. Cardiovascular:  Regular rate and rhythm with normal S1/S2 without murmurs, rubs or gallops. Abdomen: Soft, non-tender, non-distended with normal bowel sounds. Musculoskeletal:  No clubbing, cyanosis or edema bilaterally. Skin: Skin color, texture, turgor normal.  No rashes or lesions. Neurologic: Alert and oriented to self, nonverbal, following commands intermittently, does not cross midline to right on pupillary exam, moving all extremities spontaneously.   Psychiatric:  Alert and oriented to self exam limited,   Capillary Refill: Brisk,< 3 seconds   Peripheral Pulses: +2 palpable, equal bilaterally       Labs:     Recent Labs     06/10/22  0657 06/11/22  1757 06/12/22  0454   WBC 5.7 6.3 8.0   HGB 12.2 11.5* 11.6*   HCT 35.7* 34.5* 34.1*    262 246     Recent Labs     06/10/22  0657 06/11/22  1757 06/12/22  0454   * 134* 136   K 4.5 4.8 4.5    98* 101   CO2 30 25 24   BUN 25* 33* 28*   CREATININE 1.2 1.3* 1.3*   CALCIUM 9.2 8.9 9.1     No results for input(s): AST, ALT, BILIDIR, BILITOT, ALKPHOS in the last 72 hours. Recent Labs     06/11/22  1757   INR 1.11     No results for input(s): [place]ry [place] in the last 72 hours. Urinalysis:      Lab Results   Component Value Date    NITRU POSITIVE 06/08/2022    WBCUA 6-9 06/08/2022    BACTERIA 4+ 06/08/2022    RBCUA 3-4 06/08/2022    BLOODU TRACE-INTACT 06/08/2022    SPECGRAV 1.015 06/08/2022    GLUCOSEU Negative 06/08/2022       Radiology:     CXR: I have reviewed the CXR with the following interpretation: N/A  EKG:  I have reviewed the EKG with the following interpretation: N/A    CT HEAD WO CONTRAST   Final Result      1. Unchanged small volume acute subarachnoid hemorrhage in right frontoparietal and bilateral occipital lobe sulci. 2.  Unchanged size of right frontal convexity and left cerebral convexity subdural collections with new diffusely increased density secondary to interval CT angiogram. These may indicate chronic subdural effusions or hygromas rather than chronic subdural    hematomas due to the leakage of contrast.   3.  Unchanged 3 mm rightward midline shift. CT head  1. Increasing left-sided subdural hematoma as described above with persistent right frontal subdural hematoma, similar compared to prior   2. Multifocal subarachnoid and subcortical parenchymal hematomas or hemorrhagic staining have decreased slightly in the frontal and bilateral parietal regions   3. Remote ischemic change in the left cerebellar region appears stable     CTA Head and Neck  1.  No aneurysms, vascular occlusions, or intracranial stenoses identified. 2.  No significant stenosis in the extracranial vertebral or carotid arteries.            ASSESSMENT:    Active Hospital Problems    Diagnosis Date Noted    Acute metabolic encephalopathy [L88.40] 06/11/2022     Priority: Medium    Seizure ([address] [R56.9] 06/11/2022 Priority: Medium     #Confusion suspected postictal  CT head showed slightly increased in subdural hematoma. Will start on Keppra 500 twice daily  Neurosurgery recommended every 2 hours neurochecks. CT head at midnight. Hold aspirin, Plavix  Seizure prophylaxis. Neurology consulted. #Closed fracture of the neck of the right femoral after mechanical fall  Conservative management. #UTI  Cultures positive for E. coli. Was getting Bactrim  Will start on IV Rocephin. DVT Prophylaxis: SCD  Diet: Diet NPO  Code Status: Limited    PT/OT Eval Status: consulted    Dispo - inpatient. Alannah Landry MD    Thank you Cinthya Caban MD for the opportunity to be involved in this patient's care. If you have any questions or concerns please feel free to contact me at [phone].     This chart was likely completed using voice recognition technology and may contain unintended grammatical , phraseology,and/or punctuation errors

## 2022-06-11 NOTE — PROGRESS NOTES
A&O. Assessment complete. Forgetful at times. Delayed repsonses. Denies pain. At this time. Up to WC. Safety measures in place.

## 2022-06-11 NOTE — SIGNIFICANT EVENT
Rapid response called at 5 PM.  Patient was found unable to speak. Last known well was 12 PM.  Patient with history of recent subarachnoid hemorrhage. /70, HR 76, satting well on room air, glucose 87. On exam, patient staring blankly, aphasic, and unable to follow commands. Moving left side spontaneously. Right facial droop and unable to move right upper and lower extremities. Called  stroke team.  Patient is not a tPA candidate but will obtain CT head and CTA head neck.   We will follow-up on imaging and discuss with hospitalist.

## 2022-06-11 NOTE — PROGRESS NOTES
Per ICU Resident, pt will be Dc'ed to another unit, for at least 24 hrs of neuro observation.  [facility] made aware. Family remains at bedside.

## 2022-06-11 NOTE — PLAN OF CARE
Called regarding patient   Acute AMS with right facial droop    CT head with concern for increasing SDH on the left - CT shows low density fluid - hygroma    Concern given history for seizure    Recommend  1. Re-admit for evaluation - q2 hour neurochecks  2. Start Keppra 500 BID  3. CT head in 6 hours  4.  Neurology seizure evaluation    If returns to baseline then can go back to ARU

## 2022-06-11 NOTE — PROGRESS NOTES
Rapid response called for pt at 1700. Pt is unable to speak or respond. Eyes are staring off. Slight facial droop to the right. This RN, and charge RN took pt to stat CT. Pt in ARU room currently. Called son and he was in the building. Son and Daughter in law at bedside. Awaiting response from Resident. Resident waiting on CT results.

## 2022-06-11 NOTE — PLAN OF CARE
Problem: Safety - Adult  Goal: Free from fall injury  Outcome: Progressing  Flowsheets (Taken 6/11/2022 1045)  Free From Fall Injury: Instruct family/caregiver on patient safety  Note: Patient using call light appropriately. Verbalizes understanding of safety measures while on ARU. Remains free from falls. Up in [facility], call light in reach. Chair alarm on.

## 2022-06-12 ENCOUNTER — APPOINTMENT (OUTPATIENT)
Dept: MRI IMAGING | Age: 87
DRG: 064 | End: 2022-06-12
Attending: INTERNAL MEDICINE
Payer: MEDICARE

## 2022-06-12 ENCOUNTER — APPOINTMENT (OUTPATIENT)
Dept: CT IMAGING | Age: 87
DRG: 064 | End: 2022-06-12
Attending: INTERNAL MEDICINE
Payer: MEDICARE

## 2022-06-12 PROBLEM — R29.90 STROKE-LIKE SYMPTOMS: Status: ACTIVE | Noted: 2022-06-11

## 2022-06-12 PROBLEM — N30.00 ACUTE CYSTITIS WITHOUT HEMATURIA: Status: ACTIVE | Noted: 2022-06-12

## 2022-06-12 LAB
ANION GAP SERPL CALCULATED.3IONS-SCNC: 11 MMOL/L (ref 3–16)
BASOPHILS ABSOLUTE: 0.1 K/UL (ref 0–0.2)
BASOPHILS RELATIVE PERCENT: 0.9 %
BUN BLDV-MCNC: 28 MG/DL (ref 7–20)
CALCIUM SERPL-MCNC: 9.1 MG/DL (ref 8.3–10.6)
CHLORIDE BLD-SCNC: 101 MMOL/L (ref 99–110)
CO2: 24 MMOL/L (ref 21–32)
CREAT SERPL-MCNC: 1.3 MG/DL (ref 0.6–1.2)
EOSINOPHILS ABSOLUTE: 0.1 K/UL (ref 0–0.6)
EOSINOPHILS RELATIVE PERCENT: 1.6 %
GFR AFRICAN AMERICAN: 47
GFR NON-AFRICAN AMERICAN: 39
GLUCOSE BLD-MCNC: 98 MG/DL (ref 70–99)
HCT VFR BLD CALC: 34.1 % (ref 36–48)
HEMOGLOBIN: 11.6 G/DL (ref 12–16)
LYMPHOCYTES ABSOLUTE: 0.5 K/UL (ref 1–5.1)
LYMPHOCYTES RELATIVE PERCENT: 5.7 %
MCH RBC QN AUTO: 32.1 PG (ref 26–34)
MCHC RBC AUTO-ENTMCNC: 34.1 G/DL (ref 31–36)
MCV RBC AUTO: 94.2 FL (ref 80–100)
MONOCYTES ABSOLUTE: 0.7 K/UL (ref 0–1.3)
MONOCYTES RELATIVE PERCENT: 8.3 %
NEUTROPHILS ABSOLUTE: 6.7 K/UL (ref 1.7–7.7)
NEUTROPHILS RELATIVE PERCENT: 83.5 %
PDW BLD-RTO: 12.8 % (ref 12.4–15.4)
PLATELET # BLD: 246 K/UL (ref 135–450)
PMV BLD AUTO: 8.9 FL (ref 5–10.5)
POTASSIUM REFLEX MAGNESIUM: 4.5 MMOL/L (ref 3.5–5.1)
RBC # BLD: 3.62 M/UL (ref 4–5.2)
SODIUM BLD-SCNC: 136 MMOL/L (ref 136–145)
WBC # BLD: 8 K/UL (ref 4–11)

## 2022-06-12 PROCEDURE — 2580000003 HC RX 258: Performed by: INTERNAL MEDICINE

## 2022-06-12 PROCEDURE — 85025 COMPLETE CBC W/AUTO DIFF WBC: CPT

## 2022-06-12 PROCEDURE — 70551 MRI BRAIN STEM W/O DYE: CPT

## 2022-06-12 PROCEDURE — 6360000002 HC RX W HCPCS: Performed by: INTERNAL MEDICINE

## 2022-06-12 PROCEDURE — 36415 COLL VENOUS BLD VENIPUNCTURE: CPT

## 2022-06-12 PROCEDURE — 1200000000 HC SEMI PRIVATE

## 2022-06-12 PROCEDURE — APPNB60 APP NON BILLABLE TIME 46-60 MINS: Performed by: NURSE PRACTITIONER

## 2022-06-12 PROCEDURE — 6360000002 HC RX W HCPCS: Performed by: NURSE PRACTITIONER

## 2022-06-12 PROCEDURE — 99223 1ST HOSP IP/OBS HIGH 75: CPT | Performed by: PSYCHIATRY & NEUROLOGY

## 2022-06-12 PROCEDURE — 70450 CT HEAD/BRAIN W/O DYE: CPT

## 2022-06-12 PROCEDURE — 2580000003 HC RX 258: Performed by: NURSE PRACTITIONER

## 2022-06-12 PROCEDURE — 80048 BASIC METABOLIC PNL TOTAL CA: CPT

## 2022-06-12 RX ORDER — LORAZEPAM 2 MG/ML
0.5 INJECTION INTRAMUSCULAR ONCE
Status: COMPLETED | OUTPATIENT
Start: 2022-06-12 | End: 2022-06-12

## 2022-06-12 RX ADMIN — LEVETIRACETAM 500 MG: 100 INJECTION, SOLUTION, CONCENTRATE INTRAVENOUS at 09:59

## 2022-06-12 RX ADMIN — SODIUM CHLORIDE 25 ML: 9 INJECTION, SOLUTION INTRAVENOUS at 09:58

## 2022-06-12 RX ADMIN — SODIUM CHLORIDE: 9 INJECTION, SOLUTION INTRAVENOUS at 12:06

## 2022-06-12 RX ADMIN — SODIUM CHLORIDE 25 ML: 9 INJECTION, SOLUTION INTRAVENOUS at 22:15

## 2022-06-12 RX ADMIN — LORAZEPAM 0.5 MG: 2 INJECTION INTRAMUSCULAR; INTRAVENOUS at 17:02

## 2022-06-12 RX ADMIN — SODIUM CHLORIDE 25 ML: 9 INJECTION, SOLUTION INTRAVENOUS at 20:52

## 2022-06-12 RX ADMIN — SODIUM CHLORIDE, PRESERVATIVE FREE 10 ML: 5 INJECTION INTRAVENOUS at 09:45

## 2022-06-12 RX ADMIN — SODIUM CHLORIDE: 9 INJECTION, SOLUTION INTRAVENOUS at 22:37

## 2022-06-12 RX ADMIN — LEVETIRACETAM 1000 MG: 100 INJECTION, SOLUTION, CONCENTRATE INTRAVENOUS at 22:17

## 2022-06-12 RX ADMIN — CEFTRIAXONE 1000 MG: 1 INJECTION, POWDER, FOR SOLUTION INTRAMUSCULAR; INTRAVENOUS at 21:19

## 2022-06-12 ASSESSMENT — PAIN SCALES - GENERAL
PAINLEVEL_OUTOF10: 0
PAINLEVEL_OUTOF10: 3

## 2022-06-12 NOTE — PROGRESS NOTES
Hospitalist Progress Note      PCP: Georgia Johnson MD    Date of Admission: 6/11/2022    Chief Complaint: confusion    Hospital Course: Logan Rubio 80 y.o. female history of frequent falls, hypertension, history of CVA, recent hospitalization after a fall where she was noted to have ICH/SDH also noted to have closed fracture of neck of right femoral.  Was transferred to ARU for rehab on 6/7. Rapid response was called on 6/11 evening patient was confused she was not following any command. CT head increasing left-sided subdural hematoma. Remote ischemic changes in the left cerebral lesion appears stable    Patient transferred to regular floor for concern of seizure and change in mentation. Subjective: Patient seen and examined today. Nonverbal.  Not following any commands. Brother at bedside      Medications:  Reviewed    Infusion Medications    sodium chloride 25 mL (06/12/22 0972)    sodium chloride 75 mL/hr at 06/11/22 2130     Scheduled Medications    sodium chloride flush  5-40 mL IntraVENous 2 times per day    cefTRIAXone (ROCEPHIN) IV  1,000 mg IntraVENous Q24H    levETIRAcetam  500 mg IntraVENous Q12H     PRN Meds: sodium chloride flush, sodium chloride, ondansetron **OR** ondansetron, polyethylene glycol, acetaminophen **OR** acetaminophen      Intake/Output Summary (Last 24 hours) at 6/12/2022 1135  Last data filed at 6/11/2022 2151  Gross per 24 hour   Intake 10 ml   Output --   Net 10 ml       Physical Exam Performed:    BP (!) 145/62   Pulse 73   Temp 97.4 °F (36.3 °C) (Axillary)   Resp 19   Ht 5' 8\" (1.727 m)   Wt 123 lb 7.3 oz (56 kg)   SpO2 98%   BMI 18.77 kg/m²     General appearance: Elderly, confused   HEENT: Pupils equal, round, and reactive to light. Conjunctivae/corneas clear. Neck: Supple, with full range of motion. No jugular venous distention. Trachea midline. Respiratory:  Normal respiratory effort.  Clear to auscultation, bilaterally without Rales/Wheezes/Rhonchi. Cardiovascular: Regular rate and rhythm with normal S1/S2 without murmurs, rubs or gallops. Abdomen: Soft, non-tender, non-distended with normal bowel sounds. Musculoskeletal: No clubbing, cyanosis or edema bilaterally. Skin: Ecchymosis around right thigh. Neurologic: Alert oriented to self moving all extremities spontaneously sometimes following commands with grasping hand. Mild facial droop. Psychiatric: Exam limited due to clinical condition   capillary Refill: Brisk,< 3 seconds   Peripheral Pulses: +2 palpable, equal bilaterally       Labs:   Recent Labs     06/10/22  0657 06/11/22  1757 06/12/22  0454   WBC 5.7 6.3 8.0   HGB 12.2 11.5* 11.6*   HCT 35.7* 34.5* 34.1*    262 246     Recent Labs     06/10/22  0657 06/11/22  1757 06/12/22  0454   * 134* 136   K 4.5 4.8 4.5    98* 101   CO2 30 25 24   BUN 25* 33* 28*   CREATININE 1.2 1.3* 1.3*   CALCIUM 9.2 8.9 9.1     No results for input(s): AST, ALT, BILIDIR, BILITOT, ALKPHOS in the last 72 hours. Recent Labs     06/11/22 1757   INR 1.11     No results for input(s): [place] in the last 72 hours. Urinalysis:      Lab Results   Component Value Date    NITRU POSITIVE 06/08/2022    WBCUA 6-9 06/08/2022    BACTERIA 4+ 06/08/2022    RBCUA 3-4 06/08/2022    BLOODU TRACE-INTACT 06/08/2022    SPECGRAV 1.015 06/08/2022    GLUCOSEU Negative 06/08/2022       Radiology:  CT HEAD WO CONTRAST   Final Result      1. Unchanged small volume acute subarachnoid hemorrhage in right frontoparietal and bilateral occipital lobe sulci. 2.  Unchanged size of right frontal convexity and left cerebral convexity subdural collections with new diffusely increased density secondary to interval CT angiogram. These may indicate chronic subdural effusions or hygromas rather than chronic subdural    hematomas due to the leakage of contrast.   3.  Unchanged 3 mm rightward midline shift.               Assessment/Plan:    Active Hospital Problems    Diagnosis Date Noted    Acute metabolic encephalopathy [A55.86] 06/11/2022     Priority: Medium    Seizure ([facility] [address] [R56.9] 06/11/2022     Priority: Medium     #Acute metabolic encephalopathy   #Suspected delirium  #Suspected postictal state  Cannot rule out underlying ischemic process. Will obtain an MRI brain without contrast  Delirium precautions  Neurochecks. Seizure precautions. Continue Keppra 500 twice daily. Will repeat UA check ammonia level. #Slightly worsening of subdural hematoma. Repeat CT head showed a stable hematoma. No AC/AP until cleared by neurosurgery. PT/OT eval.    #UTI  Patient was diagnosed with UTI on 6/8 cultures positive for E. coli. She was on Bactrim. Currently on Rocephin day 2. Will repeat UA. #DENICE likely SHANTE  Patient did received contrast yesterday. Continue IV fluid hydration. Avoid nephrotoxic agent. Monitor creatinine    #Closed fracture of the neck of the right femoral after a fall. Orthopedic was consulted on prior admission recommended conservative treatment. DVT Prophylaxis: SCD  Diet: Diet NPO  Code Status: Limited    PT/OT Eval Status: Pending evaluation    Dispo -inpatient.   Neurology eval    Vicky Ornelas MD     This chart was likely completed using voice recognition technology and may contain unintended grammatical , phraseology,and/or punctuation errors

## 2022-06-12 NOTE — PROGRESS NOTES
4 Eyes Admission Assessment     I agree as the admission nurse that 2 RN's have performed a thorough Head to Toe Skin Assessment on the patient. ALL assessment sites listed below have been assessed on admission. Areas assessed by both nurses:   [x]   Head, Face, and Ears   [x]   Shoulders, Back, and Chest  [x]   Arms, Elbows, and Hands   [x]   Coccyx, Sacrum, and Ischium  [x]   Legs, Feet, and Heels        Does the Patient have Skin Breakdown?   Yes a wound was noted on the Admission Assessment and an LDA was Initiated documentation include the Yina-wound, Wound Assessment, Measurements, Dressing Treatment, Drainage, and Color\",      Skin tear L elbow   Bruising to R hip s/p fracture/fall  Abrasion/scabbing to R side of forehead       Reed Prevention initiated:  Yes   Wound Care Orders initiated:  No      [facility] nurse consulted for Pressure Injury (Stage 3,4, Unstageable, DTI, NWPT, and Complex wounds) or Reed score 18 or lower:  No      Nurse 1 eSignature: Electronically signed by Denis Crawley RN on 6/12/22 at 1:58 AM EDT    **SHARE this note so that the co-signing nurse is able to place an eSignature**    Nurse 2 eSignature: Electronically signed by Toni Tineo RN on 6/11/22 at 9:04 PM EDT

## 2022-06-12 NOTE — CONSULTS
Neurology / Neurocritical Care Consult Note    Miguel Ybarra MD is requesting this consult. Reason for Consult: seizure  Admission Chief Complaint: sdh  History of Present Illness     Eloy Ansari is a 80 y.o. y/o female with a history of recent ICH including tSAH, frequent falls who presents with acute onset of aphasia and right hemiparesis which was first noted at 5 PM 6/11. Symptoms were severe. CT head showed increasing left SDH. Her brothers and sister in law at bedside report they haven't seen her in a week, but she was living alone, capable of talking/walking, mowing the grass a week ago. Per last neurosurgery note, she was following commands with clear speech, intact EOM, and good strength throughout (6/1/22). At the time of last neurosurgery note it was recommended antiplatelets and anticoagulation be held for 2 weeks. She has UTI and DENICE this admission.     REVIEW OF SYSTEMS:   Unable to assess given aphasia    Past Medical, Surgical, Family, and Social History   PAST MEDICAL HISTORY:  Past Medical History:   Diagnosis Date    Allergic rhinitis     GERD (gastroesophageal reflux disease)     Hyperlipidemia     Laceration of head 06/23/2016     SURGICAL HISTORY:  Past Surgical History:   Procedure Laterality Date    APPENDECTOMY      COLONOSCOPY  4-2011    FOREARM SURGERY Left 8/10/2020    OPEN REDUCTION INTERNAL FIXATION LEFT DISTAL RADIUS with mini c-arm performed by Devi Nguyen MD at [place] [address]  8/2010    left Ulna    KNEE SURGERY      left torn meniscus     FAMILY HISTORY & SOCIAL HISTORY:  Family history non-contributory  Family History   Problem Relation Age of Onset    Arthritis Mother     Heart Attack Father      Social History     Tobacco History     Smoking Status  Never Smoker    Smokeless Tobacco Use  Former User          Alcohol History     Alcohol Use Status  No          Drug Use     Drug Use Status  No          Sexual Activity     Sexually Active  Not Asked              Allergies & Outpatient Medications   ALLERGIES:  Allergies   Allergen Reactions    Levofloxacin Nausea Only    Atorvastatin Other (See Comments)     HOME MEDICATIONS:  Current Discharge Medication List      CONTINUE these medications which have NOT CHANGED    Details   levETIRAcetam (KEPPRA) 500 MG tablet Take 1 tablet by mouth 2 times daily for 14 days  Qty: 28 tablet, Refills: 0      Calcium-Phosphorus-Vitamin D (CALCIUM GUMMIES PO) Take by mouth           Physical Exam   PHYSICAL EXAM:  Vitals:    06/12/22 0000 06/12/22 0400 06/12/22 0934 06/12/22 1206   BP: (!) 145/68 132/75 (!) 145/62 (!) 145/69   Pulse: 88 77 73 68   Resp: 20 18 19 17   Temp: 98.3 °F (36.8 °C) 98.2 °F (36.8 °C) 97.4 °F (36.3 °C) 98.1 °F (36.7 °C)   TempSrc: Axillary Oral Axillary Oral   SpO2:  97% 98% 98%   Weight:       Height:           General: Alert, no distress, well-nourished  Neurologic  Mental status: alert; aphasic  orientation unable to assess given aphasia   Attention right hemineglect   Language aphasic   Comprehension does not follow any commands    Cranial nerves:   CN2: no blink to threat on the right  CN 3,4,6: Pupils equal and reactive to light, leftward gaze. Able to come to midline but does not cross midline  CN5: Facial sensation NINA given aphasia   CN7: right facial weakness  CN8: she appears to hear my voice  CN9: NINA given aphasia  CN11: NINA given aphasia  CN12: NINA given aphasia    Motor Exam:   R  L    Deltoid 0  3   Biceps 0 3   Triceps 0 3   Wrist extension  0 3   Interossei 0 3      R  L    Hip flexion  1  3   Hip extension  1 3   Knee flexion  1 3   Knee extension  1 3   Ankle dorsiflexion  1 3   Ankle plantar flexion  1 3     Deep tendon reflexes: limited given cooperation    Sensory: light touch intact and symmetric in all 4 extremities. No sensory extinction on bilateral simultaneous stimulation  Cerebellar/coordination:NINA given not following commands  Tone: increased on the right.  Normal on the left. Gait: deferred for safety     OTHER SYSTEMS:  Cardiovascular: Warm, appears well perfused   Respiratory: Easy, non-labored respiratory pattern   Abdominal: Abdomen is without distention   Extremities: Upper and lower extremities are atraumatic in appearance without deformity. No swelling or erythema. Diagnostic Testing Results   IMAGES:  Images personally reviewed and agree w/ radiology interpretation. Head CT w/o Contrast:  1.  Unchanged small volume acute subarachnoid hemorrhage in right frontoparietal and bilateral occipital lobe sulci. 2.  Unchanged size of right frontal convexity and left cerebral convexity subdural collections with new diffusely increased density secondary to interval CT angiogram. These may indicate chronic subdural effusions or hygromas rather than chronic subdural    hematomas due to the leakage of contrast.   3.  Unchanged 3 mm rightward midline shift. CT-A head and neck  1.  No aneurysms, vascular occlusions, or intracranial stenoses identified. 2.  No significant stenosis in the extracranial vertebral or carotid arteries. Echo: EF 55-60%. No right to left shunt. LABS:  All results below personally reviewed. Pertinent positives & negatives are addressed in Impression & Recommendations below. LABS   Metabolic Panel Recent Labs     06/10/22  0657 06/11/22  1757 06/12/22  0454   * 134* 136   K 4.5 4.8 4.5    98* 101   CO2 30 25 24   BUN 25* 33* 28*   CREATININE 1.2 1.3* 1.3*   GLUCOSE 96 93 98   CALCIUM 9.2 8.9 9.1   MG  --  2.30  --       CBC / Coags Recent Labs     06/10/22  0657 06/11/22  1757 06/12/22  0454   WBC 5.7 6.3 8.0   RBC 3.76* 3.60* 3.62*   HGB 12.2 11.5* 11.6*   HCT 35.7* 34.5* 34.1*    262 246   INR  --  1.11  --       Other No results for input(s): LABA1C, LDLCALC, TRIG, TSH, [id number], FOLATE, LABSALI, COVID19 in the last 72 hours.   Recent Labs     06/11/22 1757   LACTA 1.0     Escherichia coli UTI > 100K CFU/ML       CURRENT SCHEDULED MEDICATIONS   Inpatient Medications     sodium chloride flush, 5-40 mL, IntraVENous, 2 times per day    cefTRIAXone (ROCEPHIN) IV, 1,000 mg, IntraVENous, Q24H    levETIRAcetam, 500 mg, IntraVENous, Q12H   Infusions    sodium chloride 25 mL (06/12/22 0958)    sodium chloride 125 mL/hr at 06/12/22 1206      Antibiotics   Recent Abx Admin                   cefTRIAXone (ROCEPHIN) 1000 mg IVPB in 50 mL D5W minibag (mg) 1,000 mg New Bag 06/11/22 3379                 IMPRESSION & RECOMMENDATIONS     IMPRESSION:  Ms. 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 Seizure is possible but given persistence of symptoms I favor evaluating for stroke as well (if size/acuity of SDH is not felt to be significant enough to cause a complete left MCA syndrome). Additionally, seizure would not cause leftward gaze if epileptogenesis was from the left hemisphere. Ct-A showed no LVO. RECOMMENDATIONS:  - Agree with MRI brain w/o mary which has already been ordered  - cEEG if MRI is unrevealing.   - Continue to hold anticoagulation and antiplatelets  - Neurosurgery following  - Further recommendations to follow pending MRI. DAMI Swan - CNP   Neurology & Neurocritical Care   Neurology Line: [phone]  [employer]: [facility] Neurology & Neurocritical Care NPs  6/12/2022 12:52 PM    I spent 60 minutes in the care of this patient. Over 50% of that time was in face-to-face counseling regarding disease process, diagnostic testing, preventative measures, and answering patient and family questions.

## 2022-06-12 NOTE — CONSULTS
Neurosurgery Consult Note    Reason for Consult:  Requesting Provider:    Subjective:  CHIEF COMPLAINT / HPI:  He Campos is a 80 y.o. female patient being seen for complaints of [address] 80 y.o. female history of frequent falls, hypertension, history of CVA, recent hospitalization after a fall where she was noted to have SAH/SDH also noted to have closed fracture of neck of right femoral.  Was transferred to ARU for rehab on 6/7.       Rapid response was called on 6/11 evening patient was confused she was not following any command.     Concern on CT head increasing left-sided subdural hematoma - it is really hygroma no acute blood. Remote ischemic changes in the left cerebral lesion appears stable     Patient transferred to regular floor for concern of seizure and change in mentation.   When I was contacted I recommend Keppra as the clinical picture is more that of Seizures    ON my evaluation the patient has a left gaze preference and does not answer questions    Past Medical History:   Diagnosis Date    Allergic rhinitis     GERD (gastroesophageal reflux disease)     Hyperlipidemia     Laceration of head 06/23/2016     Past Surgical History:   Procedure Laterality Date    APPENDECTOMY      COLONOSCOPY  4-2011    FOREARM SURGERY Left 8/10/2020    OPEN REDUCTION INTERNAL FIXATION LEFT DISTAL RADIUS with mini c-arm performed by Tk Rodriguez MD at [address] [address]  8/2010    left Ulna    KNEE SURGERY      left torn meniscus     Levofloxacin and Atorvastatin    Current Facility-Administered Medications:     sodium chloride flush 0.9 % injection 5-40 mL, 5-40 mL, IntraVENous, 2 times per day, Lissette Curry MD, 10 mL at 06/12/22 0945    sodium chloride flush 0.9 % injection 5-40 mL, 5-40 mL, IntraVENous, PRN, Lissette Curry MD    0.9 % sodium chloride infusion, , IntraVENous, PRN, Lissette Curry MD, Last Rate: 100 mL/hr at 06/12/22 0958, 25 mL at 06/12/22 0958    ondansetron (ZOFRAN-ODT) disintegrating tablet 4 mg, 4 mg, Oral, Q8H PRN **OR** ondansetron (ZOFRAN) injection 4 mg, 4 mg, IntraVENous, Q6H PRN, Bakari Paulino MD    polyethylene glycol (GLYCOLAX) packet 17 g, 17 g, Oral, Daily PRN, Bakari Paulino MD    acetaminophen (TYLENOL) tablet 650 mg, 650 mg, Oral, Q6H PRN **OR** acetaminophen (TYLENOL) suppository 650 mg, 650 mg, Rectal, Q6H PRN, Bakari Paulino MD    0.9 % sodium chloride infusion, , IntraVENous, Continuous, Bakari Paulino MD, Last Rate: 125 mL/hr at 06/12/22 1206, New Bag at 06/12/22 1206    cefTRIAXone (ROCEPHIN) 1000 mg IVPB in 50 mL D5W minibag, 1,000 mg, IntraVENous, Q24H, Bakari Paulino MD, Stopped at 06/11/22 2215    levETIRAcetam (KEPPRA) 500 mg in sodium chloride 0.9 % 100 mL IVPB, 500 mg, IntraVENous, Q12H, Rodo Olivares MD, Stopped at 06/12/22 1053  Social History     Socioeconomic History    Marital status:      Spouse name: Not on file    Number of children: Not on file    Years of education: Not on file    Highest education level: Not on file   Occupational History    Not on file   Tobacco Use    Smoking status: Never Smoker    Smokeless tobacco: Former User   Vaping Use    Vaping Use: Never used   Substance and Sexual Activity    Alcohol use: No    Drug use: No    Sexual activity: Not on file   Other Topics Concern    Not on file   Social History Narrative    Not on file     Social Determinants of Health     Financial Resource Strain:     Difficulty of Paying Living Expenses: Not on file   Food Insecurity:     Worried About [employer] [employer] in the Last Year: Not on file    Lui of Food in the Last Year: Not on file   Transportation Needs:     Lack of Transportation (Medical): Not on file    Lack of Transportation (Non-Medical):  Not on file   Physical Activity:     Days of Exercise per Week: Not on file    Minutes of Exercise per Session: Not on file   Stress:     Feeling of Stress : Not on file   Social Connections:     Frequency of Communication with Friends and Family: Not on file    Frequency of Social Gatherings with Friends and Family: Not on file    Attends Taoist Services: Not on file    Active Member of Clubs or Organizations: Not on file    Attends Club or Organization Meetings: Not on file    Marital Status: Not on file   Intimate Partner Violence:     Fear of Current or Ex-Partner: Not on file    Emotionally Abused: Not on file    Physically Abused: Not on file    Sexually Abused: Not on file   Housing Stability:     Unable to Pay for Housing in the Last Year: Not on file    Number of [place] in the Last Year: Not on file    Unstable Housing in the Last Year: Not on file     Family History   Problem Relation Age of Onset    Arthritis Mother     Heart Attack Father        ROS: Cannot Complete 10 point ROS patient currently non-verbal and AMS      PHYSICAL EXAMINATION:  BP (!) 145/69   Pulse 68   Temp 98.1 °F (36.7 °C) (Oral)   Resp 17   Ht 5' 8\" (1.727 m)   Wt 123 lb 7.3 oz (56 kg)   SpO2 98%   BMI 18.77 kg/m²      Physical Exam:    Temp (24hrs), Av °F (36.7 °C), Min:97.4 °F (36.3 °C), Max:98.3 °F (36.8 °C)      Neuro Exam  The patient is in no acute distress. Awake but with left gaze preference and not answer questions    PERRLA, EOM with left deviation and conjugate  CN 2-12 unable to fully evaluate    Ania sign neg BUE, Babinski sign is down-going BLE    Capillary refill is less than 2 s in all 4 extremities, pulses are strong and symmetric. Sensation is intact to pain  . Gait deferred.       Formal motor exam not able to complete  Patient moves all extremities spontaneously right greater than left but did squeeze left hand      Labs:  Recent Labs     22  0454   WBC 8.0   HGB 11.6*   HCT 34.1*          Recent Labs     06/10/22  0657 22  1757 22  0454   NA   < > 134* 136   K   < > 4.8 4.5   CL   < > 98* 101   CO2   < > 25 24   BUN < > 33* 28*   CREATININE   < > 1.3* 1.3*   GLUCOSE   < > 93 98   CALCIUM   < > 8.9 9.1   MG  --  2.30  --     < > = values in this interval not displayed. Recent Labs     06/11/22  1757   PROTIME 14.2   INR 1.11         IMAGING STUDIES:   I have personally reviewed the CT head an compared to prior imaging  Agree with report    1.  Unchanged small volume acute subarachnoid hemorrhage in right frontoparietal and bilateral occipital lobe sulci. 2.  Unchanged size of right frontal convexity and left cerebral convexity subdural collections with new diffusely increased density secondary to interval CT angiogram. These may indicate chronic subdural effusions or hygromas rather than chronic subdural    hematomas due to the leakage of contrast.   3.  Unchanged 3 mm rightward midline shift. IMPRESSION/PLAN:  Active Problems:  AMS    Concern for metabolic encephalopathy vs SZ vs CVA      Recommend  1. Continue q2 hour neurochecks  2. Started Keppra 500 BID  3. CT head in 6 hours was stable    No acute blood    No surgery recommended at this time - size of extra-axial collections unlikely to cause this degree of deficit from [place]    With only 3 mm shift  4. Neurology seizure evaluation and AMS evaluation and CVA evaluation ongoing  5. MRI pending        Thank you again for this consultation.     Darrian Baer MD  6/12/2022

## 2022-06-12 NOTE — PLAN OF CARE
Pt VSS, afebrile, SR on tele, SPO2 > 90 on RA. Pt can follow commands, gaze is not fixated to L side, pt can moderate follow things/voice command with her eyes. Pt still non-verbal but she tried to speak at times. Pt R side is more weaker and rigid the L side. Seizure and fall precautions in place. No new skin breakdowns noticed. Problem: Discharge Planning  Goal: Discharge to home or other facility with appropriate resources  Outcome: Progressing  Flowsheets (Taken 6/11/2022 2051)  Discharge to home or other facility with appropriate resources:   Identify barriers to discharge with patient and caregiver   Arrange for needed discharge resources and transportation as appropriate   Identify discharge learning needs (meds, wound care, etc)   Arrange for interpreters to assist at discharge as needed     Problem: Pain  Goal: Verbalizes/displays adequate comfort level or baseline comfort level  Outcome: Progressing     Problem: Skin/Tissue Integrity  Goal: Absence of new skin breakdown  Description: 1. Monitor for areas of redness and/or skin breakdown  2. Assess vascular access sites hourly  3. Every 4-6 hours minimum:  Change oxygen saturation probe site  4. Every 4-6 hours:  If on nasal continuous positive airway pressure, respiratory therapy assess nares and determine need for appliance change or resting period.   Outcome: Progressing     Problem: Safety - Adult  Goal: Free from fall injury  Outcome: Progressing     Problem: ABCDS Injury Assessment  Goal: Absence of physical injury  Outcome: Progressing

## 2022-06-12 NOTE — PLAN OF CARE
Problem: Confusion  Goal: Confusion, delirium, dementia, or psychosis is improved or at baseline  Description: INTERVENTIONS:  1. Assess for possible contributors to thought disturbance, including medications, impaired vision or hearing, underlying metabolic abnormalities, dehydration, psychiatric diagnoses, and notify attending LIP  2. [place] high risk fall precautions, as indicated  3. Provide frequent short contacts to provide reality reorientation, refocusing and direction  4. Decrease environmental stimuli, including noise as appropriate  5. Monitor and intervene to maintain adequate nutrition, hydration, elimination, sleep and activity  6. If unable to ensure safety without constant attention obtain sitter and review sitter guidelines with assigned personnel  7.  Initiate Psychosocial CNS and Spiritual Care consult, as indicated  Outcome: Not Progressing  Flowsheets (Taken 6/12/2022 1331)  Effect of thought disturbance (confusion, delirium, dementia, or psychosis) are managed with adequate functional status:   Assess for contributors to thought disturbance, including medications, impaired vision or hearing, underlying metabolic abnormalities, dehydration, psychiatric diagnoses, notify [facility] high risk fall precautions, as indicated   Decrease environmental stimuli, including noise as appropriate     Problem: Safety - Adult  Goal: Free from fall injury  6/12/2022 1331 by Thelma Patel RN  Outcome: Progressing  Flowsheets (Taken 6/12/2022 1331)  Free From Fall Injury: Instruct family/caregiver on patient safety     Problem: Chronic Conditions and Co-morbidities  Goal: Patient's chronic conditions and co-morbidity symptoms are monitored and maintained or improved  Outcome: Progressing  Flowsheets (Taken 6/12/2022 1331)  Care Plan - Patient's Chronic Conditions and Co-Morbidity Symptoms are Monitored and Maintained or Improved:   Monitor and assess patient's chronic conditions and comorbid symptoms for stability, deterioration, or improvement   Collaborate with multidisciplinary team to address chronic and comorbid conditions and prevent exacerbation or deterioration   Update acute care plan with appropriate goals if chronic or comorbid symptoms are exacerbated and prevent overall improvement and discharge

## 2022-06-12 NOTE — PROGRESS NOTES
Speech Language Pathology  Attempt Note    Attempted to see pt this AM for SLP evaluation. RN stated pt NPO per neurosurgery team and limited responsiveness. Will re-attempt as able this date and as schedule allows.       Electronically signed by:  Leana Sebastian M.A., [address] [place] [address]  Speech-Language Pathologist  Pg #: [phone]

## 2022-06-12 NOTE — PROGRESS NOTES
Update provided to pts son and daughter in law. MRI screening form completed via phone at this time. Family requests call from MD. MD made aware.

## 2022-06-13 LAB
ANION GAP SERPL CALCULATED.3IONS-SCNC: 10 MMOL/L (ref 3–16)
BASOPHILS ABSOLUTE: 0 K/UL (ref 0–0.2)
BASOPHILS RELATIVE PERCENT: 0.4 %
BUN BLDV-MCNC: 23 MG/DL (ref 7–20)
CALCIUM SERPL-MCNC: 8.5 MG/DL (ref 8.3–10.6)
CHLORIDE BLD-SCNC: 104 MMOL/L (ref 99–110)
CO2: 22 MMOL/L (ref 21–32)
CREAT SERPL-MCNC: 1.1 MG/DL (ref 0.6–1.2)
EOSINOPHILS ABSOLUTE: 0.2 K/UL (ref 0–0.6)
EOSINOPHILS RELATIVE PERCENT: 2.4 %
GFR AFRICAN AMERICAN: 57
GFR NON-AFRICAN AMERICAN: 47
GLUCOSE BLD-MCNC: 81 MG/DL (ref 70–99)
HCT VFR BLD CALC: 34.8 % (ref 36–48)
HEMOGLOBIN: 11.7 G/DL (ref 12–16)
LYMPHOCYTES ABSOLUTE: 0.5 K/UL (ref 1–5.1)
LYMPHOCYTES RELATIVE PERCENT: 8.1 %
MCH RBC QN AUTO: 32.3 PG (ref 26–34)
MCHC RBC AUTO-ENTMCNC: 33.6 G/DL (ref 31–36)
MCV RBC AUTO: 96.1 FL (ref 80–100)
MONOCYTES ABSOLUTE: 0.9 K/UL (ref 0–1.3)
MONOCYTES RELATIVE PERCENT: 14.7 %
NEUTROPHILS ABSOLUTE: 4.7 K/UL (ref 1.7–7.7)
NEUTROPHILS RELATIVE PERCENT: 74.4 %
PDW BLD-RTO: 13 % (ref 12.4–15.4)
PLATELET # BLD: 236 K/UL (ref 135–450)
PMV BLD AUTO: 9.1 FL (ref 5–10.5)
POTASSIUM REFLEX MAGNESIUM: 5 MMOL/L (ref 3.5–5.1)
RBC # BLD: 3.63 M/UL (ref 4–5.2)
SODIUM BLD-SCNC: 136 MMOL/L (ref 136–145)
WBC # BLD: 6.4 K/UL (ref 4–11)

## 2022-06-13 PROCEDURE — 99221 1ST HOSP IP/OBS SF/LOW 40: CPT | Performed by: NURSE PRACTITIONER

## 2022-06-13 PROCEDURE — 92610 EVALUATE SWALLOWING FUNCTION: CPT

## 2022-06-13 PROCEDURE — 94760 N-INVAS EAR/PLS OXIMETRY 1: CPT

## 2022-06-13 PROCEDURE — 94761 N-INVAS EAR/PLS OXIMETRY MLT: CPT

## 2022-06-13 PROCEDURE — 6360000002 HC RX W HCPCS: Performed by: NURSE PRACTITIONER

## 2022-06-13 PROCEDURE — 85025 COMPLETE CBC W/AUTO DIFF WBC: CPT

## 2022-06-13 PROCEDURE — 36415 COLL VENOUS BLD VENIPUNCTURE: CPT

## 2022-06-13 PROCEDURE — 6360000002 HC RX W HCPCS: Performed by: INTERNAL MEDICINE

## 2022-06-13 PROCEDURE — 80048 BASIC METABOLIC PNL TOTAL CA: CPT

## 2022-06-13 PROCEDURE — 99233 SBSQ HOSP IP/OBS HIGH 50: CPT | Performed by: NURSE PRACTITIONER

## 2022-06-13 PROCEDURE — 2580000003 HC RX 258: Performed by: NURSE PRACTITIONER

## 2022-06-13 PROCEDURE — 2580000003 HC RX 258: Performed by: INTERNAL MEDICINE

## 2022-06-13 PROCEDURE — 1200000000 HC SEMI PRIVATE

## 2022-06-13 RX ORDER — MORPHINE SULFATE 2 MG/ML
2 INJECTION, SOLUTION INTRAMUSCULAR; INTRAVENOUS
Status: DISCONTINUED | OUTPATIENT
Start: 2022-06-13 | End: 2022-06-14 | Stop reason: HOSPADM

## 2022-06-13 RX ORDER — MORPHINE SULFATE 2 MG/ML
1 INJECTION, SOLUTION INTRAMUSCULAR; INTRAVENOUS
Status: DISCONTINUED | OUTPATIENT
Start: 2022-06-13 | End: 2022-06-14 | Stop reason: HOSPADM

## 2022-06-13 RX ORDER — LORAZEPAM 2 MG/ML
0.5 INJECTION INTRAMUSCULAR EVERY 6 HOURS PRN
Status: DISCONTINUED | OUTPATIENT
Start: 2022-06-13 | End: 2022-06-14 | Stop reason: HOSPADM

## 2022-06-13 RX ADMIN — SODIUM CHLORIDE, PRESERVATIVE FREE 10 ML: 5 INJECTION INTRAVENOUS at 22:56

## 2022-06-13 RX ADMIN — LEVETIRACETAM 1000 MG: 100 INJECTION, SOLUTION, CONCENTRATE INTRAVENOUS at 09:45

## 2022-06-13 RX ADMIN — CEFTRIAXONE 1000 MG: 1 INJECTION, POWDER, FOR SOLUTION INTRAMUSCULAR; INTRAVENOUS at 22:50

## 2022-06-13 RX ADMIN — SODIUM CHLORIDE: 9 INJECTION, SOLUTION INTRAVENOUS at 05:58

## 2022-06-13 RX ADMIN — LEVETIRACETAM 500 MG: 100 INJECTION, SOLUTION, CONCENTRATE INTRAVENOUS at 22:30

## 2022-06-13 ASSESSMENT — PAIN SCALES - PAIN ASSESSMENT IN ADVANCED DEMENTIA (PAINAD)
FACIALEXPRESSION: 0
TOTALSCORE: 0
TOTALSCORE: 0
CONSOLABILITY: 0
BREATHING: 0
FACIALEXPRESSION: 0
BREATHING: 0
BODYLANGUAGE: 0
BODYLANGUAGE: 0
FACIALEXPRESSION: 0
CONSOLABILITY: 0
NEGVOCALIZATION: 0
NEGVOCALIZATION: 0
CONSOLABILITY: 0
TOTALSCORE: 0
NEGVOCALIZATION: 0
BREATHING: 0
BODYLANGUAGE: 0

## 2022-06-13 ASSESSMENT — PAIN SCALES - WONG BAKER
WONGBAKER_NUMERICALRESPONSE: 0

## 2022-06-13 ASSESSMENT — PAIN SCALES - GENERAL
PAINLEVEL_OUTOF10: 0

## 2022-06-13 NOTE — PROGRESS NOTES
Physical/ Occupational Therapy Hold      Orders received, chart reviewed. MRI showing large MCA infarct and per speaking with RN pts cognition has worsened. Pt currently not responding to commands and is not appropriate for therapy evaluations at this time. Per chart review pt also seeking possible palliative care. Will hold PT/OT evals and follow up as appropriate.      Magnolia Melara, PT, DPT   Carmen, OTR/L, [facility]

## 2022-06-13 NOTE — PLAN OF CARE
Problem: Discharge Planning  Goal: Discharge to home or other facility with appropriate resources  6/13/2022 1151 by Lachelle Boone RN  Outcome: Progressing  Flowsheets (Taken 6/13/2022 0904)  Discharge to home or other facility with appropriate resources: Identify barriers to discharge with patient and caregiver  6/13/2022 0150 by Edita Baxter RN  Outcome: Azalea May (Taken 6/12/2022 1953)  Discharge to home or other facility with appropriate resources:   Identify barriers to discharge with patient and caregiver   Arrange for needed discharge resources and transportation as appropriate   Identify discharge learning needs (meds, wound care, etc)   Arrange for interpreters to assist at discharge as needed     Problem: Pain  Goal: Verbalizes/displays adequate comfort level or baseline comfort level  6/13/2022 1151 by Lachelle Boone RN  Outcome: Progressing  Flowsheets (Taken 6/13/2022 0904)  Verbalizes/displays adequate comfort level or baseline comfort level: Assess pain using appropriate pain scale  6/13/2022 0150 by Edita Baxter RN  Outcome: Progressing     Problem: Skin/Tissue Integrity  Goal: Absence of new skin breakdown  Description: 1. Monitor for areas of redness and/or skin breakdown  2. Assess vascular access sites hourly  3. Every 4-6 hours minimum:  Change oxygen saturation probe site  4. Every 4-6 hours:  If on nasal continuous positive airway pressure, respiratory therapy assess nares and determine need for appliance change or resting period.   6/13/2022 1151 by Lachelle Boone RN  Outcome: Progressing  6/13/2022 0150 by Edita Baxter RN  Outcome: Progressing     Problem: Safety - Adult  Goal: Free from fall injury  6/13/2022 1151 by Lachelle Boone RN  Outcome: Progressing  6/13/2022 0150 by Edita Baxter RN  Outcome: Progressing  Flowsheets (Taken 6/12/2022 1932)  Free From Fall Injury: Instruct family/caregiver on patient safety     Problem: Chronic Conditions and Co-morbidities  Goal: Patient's chronic conditions and co-morbidity symptoms are monitored and maintained or improved  6/13/2022 1151 by Candelaria Duane, RN  Outcome: Progressing  Flowsheets (Taken 6/13/2022 0904)  Care Plan - Patient's Chronic Conditions and Co-Morbidity Symptoms are Monitored and Maintained or Improved: Monitor and assess patient's chronic conditions and comorbid symptoms for stability, deterioration, or improvement  6/13/2022 0150 by Wilberto Hinson RN  Outcome: Progressing  Flowsheets (Taken 6/12/2022 1953)  Care Plan - Patient's Chronic Conditions and Co-Morbidity Symptoms are Monitored and Maintained or Improved:   Monitor and assess patient's chronic conditions and comorbid symptoms for stability, deterioration, or improvement   Collaborate with multidisciplinary team to address chronic and comorbid conditions and prevent exacerbation or deterioration   Update acute care plan with appropriate goals if chronic or comorbid symptoms are exacerbated and prevent overall improvement and discharge     Problem: ABCDS Injury Assessment  Goal: Absence of physical injury  6/13/2022 1151 by Candelaria Duane, RN  Outcome: Progressing  6/13/2022 0150 by Wilberto Hinson RN  Outcome: Progressing  Flowsheets (Taken 6/12/2022 1932)  Absence of Physical Injury: Implement safety measures based on patient assessment

## 2022-06-13 NOTE — CONSULTS
[facility]  Palliative Medicine Consultation Note      Date Of Admission:2022  Date of consult: 22  Seen by [facility] in the past:  No    Recommendations:        Met with pt's son Nyasia Rodrigez and his wife Diane Lemus in [facility]. Nyasia Rodrigez says he is pt's only biological child and therefore her next of kin. He says his mother expressed in the past she would not want measures such as a feeding tube to keep her alive, and he wants to honor that. Explained the hospice philosophy and services, and offered choice of hospice. He prefers [facility] inpatient unit, where pt's  . D/w [facility] [place]. 1. Goals of Care/Advanced Care planning/Code status: changed to [facility] per family's wishes. Family would like to pursue hospice. 2. Pain: pt does not appear to be in any distress. Ordered morphine prn per family's wishes for comfort. 3. SOB: breathing comfortably on room air. 4. Delirium/restlessness: family reports that pt has been restless, taking off blankets and repositioning frequently, although she was more restless yesterday, and today she is more lethargic. Ordered ativan 0.5 mg q6h prn.  5. [facility]    Reason for Consult:         [x]  Goals of Care  [x]  Code Status Discussion/Advanced Care Planning   []  Psychosocial/Family Support  []  Symptom Management  []  Other (Specify)    Requesting Physician: Dr. Chelsy De La Cruz:  Confusion    History Obtained From:  family member - son Nyasia Rodrigze, electronic medical record    History of Present Illness:         Willie Pascual is a 80 y.o. female with PMH of frequent falls, hypertension, history of CVA, recent hospitalization after a fall where she was noted to have ICH/SDH also noted to have closed fracture of neck of right femoral who presented with confusion. She was in ARU and was noted with aphasia and unable to follow commands.  Code stroke was called and CT head showed slightly worsening of subdural hematoma. Neurosurgery and neurology were consulted and recommended Keppra for possible seizure and MRI. MRI showed large left MCA stroke. SLP recommended NPO status. Subjective:         Past Medical History:        Diagnosis Date    Allergic rhinitis     GERD (gastroesophageal reflux disease)     Hyperlipidemia     Laceration of head 06/23/2016       Past Surgical History:        Procedure Laterality Date    APPENDECTOMY      COLONOSCOPY  4-2011    FOREARM SURGERY Left 8/10/2020    OPEN REDUCTION INTERNAL FIXATION LEFT DISTAL RADIUS with mini c-arm performed by Vanessa Watson MD at [address] [address]  8/2010    left Ulna    KNEE SURGERY      left torn meniscus       Current Medications:    Medications Prior to Admission: levETIRAcetam (KEPPRA) 500 MG tablet, Take 1 tablet by mouth 2 times daily for 14 days  Calcium-Phosphorus-Vitamin D (CALCIUM GUMMIES PO), Take by mouth    Allergies:  Levofloxacin and Atorvastatin    Social History:    · TOBACCO: reports that she has never smoked. She has quit using smokeless tobacco.  · ETOH:   reports no history of alcohol use. · Patient currently lives alone    Review of Systems -   Review of Systems: Unable to obtain due to mental status    Objective:        Physical Exam  Constitutional:       General: She is not in acute distress. HENT:      Head: Normocephalic and atraumatic. Cardiovascular:      Rate and Rhythm: Normal rate and regular rhythm. Pulmonary:      Effort: Pulmonary effort is normal.      Breath sounds: Normal breath sounds. Abdominal:      General: Bowel sounds are normal.      Palpations: Abdomen is soft. Musculoskeletal:         General: No swelling. Skin:     General: Skin is warm and dry.    Neurological:      Comments: Did not awaken during exam          Palliative Performance Scale:  [] 60% Ambulation reduced; Significant disease; Can't do hobbies/housework; intake normal or reduced; occasional assist; LOC full/confusion  [] 50% Mainly sit/lie; Extensive disease; Can't do any work; Considerable assist; intake normal  Or reduced; LOC full/confusion  [] 40% Mainly in bed; Extensive disease; Mainly assist; intake normal or reduced; occasional assist; LOC full/confusion  [] 30% Bed Bound; Extensive disease; Total care; intake reduced; LOC full/confusion  [x] 20% Bed Bound; Extensive disease; Total care; intake minimal; Drowsy/coma  [] 10% Bed Bound; Extensive disease; Total care; Mouth care only; Drowsy/coma  [] 0% Death      Vitals:    BP (!) 123/56   Pulse 59   Temp 98.4 °F (36.9 °C) (Axillary)   Resp 18   Ht 5' 8\" (1.727 m)   Wt 123 lb 7.3 oz (56 kg)   SpO2 98%   BMI 18.77 kg/m²     Labs:    BMP:   Recent Labs     06/11/22 1757 06/12/22 0454 06/13/22  0519   * 136 136   K 4.8 4.5 5.0   CL 98* 101 104   CO2 25 24 22   BUN 33* 28* 23*   CREATININE 1.3* 1.3* 1.1   GLUCOSE 93 98 81     CBC:   Recent Labs     06/11/22 1757 06/12/22 0454 06/13/22  0519   WBC 6.3 8.0 6.4   HGB 11.5* 11.6* 11.7*   HCT 34.5* 34.1* 34.8*    246 236       LFT's: No results for input(s): AST, ALT, ALB, BILITOT, ALKPHOS in the last 72 hours. Troponin: No results for input(s): TROPONINI in the last 72 hours. BNP: No results for input(s): BNP in the last 72 hours. ABGs: No results for input(s): PHART, HFZ2EZT, PO2ART in the last 72 hours. INR:   Recent Labs     06/11/22 1757   INR 1.11       U/A:No results for input(s): NITRITE, COLORU, PHUR, LABCAST, WBCUA, RBCUA, MUCUS, TRICHOMONAS, YEAST, BACTERIA, CLARITYU, SPECGRAV, LEUKOCYTESUR, UROBILINOGEN, BILIRUBINUR, BLOODU, GLUCOSEU, AMORPHOUS in the last 72 hours. Invalid input(s): KETONESU    MRI BRAIN WO CONTRAST   Final Result      1. Large acute left MCA territory infarct. Small acute right frontal lobe infarct. 2.  Stable small volume bilateral subarachnoid hemorrhage better seen on prior CT.    3.  Right frontal and left cerebral convexity subdural collections are not well evaluated due to extensive motion artifact on FLAIR sequence but appear to represent subdural effusions or less likely hygromas given the presence of leakage of contrast on    prior CT. Stable 3 mm rightward midline shift. Discussed with the patient's nurse [facility]. CT HEAD WO CONTRAST   Final Result      1. Unchanged small volume acute subarachnoid hemorrhage in right frontoparietal and bilateral occipital lobe sulci. 2.  Unchanged size of right frontal convexity and left cerebral convexity subdural collections with new diffusely increased density secondary to interval CT angiogram. These may indicate chronic subdural effusions or hygromas rather than chronic subdural    hematomas due to the leakage of contrast.   3.  Unchanged 3 mm rightward midline shift. Conclusion/Time spent:         Recommendations see above    Pt [id number], Family [phone]  Time spent with patient and/or family: 15 min  Time reviewing records: 10 min   Time communicating with staff: 5 min     A total of 30 minutes spent with the patient and family on unit greater than 50% in counseling regarding palliative care and in goals of care for the patient. Thank you to Dr. Alex Guajardo for this consultation. We will continue to follow Ms. Bhatt's care as needed.     Gayle Persaud, SARKIS  [address]

## 2022-06-13 NOTE — FLOWSHEET NOTE
06/13/22 1439   Encounter Summary   Service Provided For: Patient and family together   Referral/Consult From: Other   (Anne Shad had attemted visit earlier.)   Support System Children   Last Encounter  06/13/22   Complexity of Encounter Low   Begin Time 1430   End Time  1445   Total Time Calculated 15 min   Encounter    Type Follow up   Spiritual/Emotional needs   Type Other (comment)  (Family accepted 's blessing. )   Rituals, Rites and Sacraments   Type Blessings   Assessment/Intervention/Outcome   Assessment Calm;Coping;Tearful   Intervention Active listening;Life review/[place]   Outcome Comfort   Patient was sleeping.  offered support to son and [employer]. They welcome other visits (and if patient is awake) a prayer.  Chaplain [facility]

## 2022-06-13 NOTE — PROGRESS NOTES
Speech Language Pathology  Facility/Department: [place] [address] [facility]   CLINICAL BEDSIDE SWALLOW EVALUATION  Late entry for ~ 10:00    NAME: King Callejas  : 1934  MRN: [id number]    ADMISSION DATE: 2022  ADMITTING DIAGNOSIS: has Hyperlipidemia; GERD (gastroesophageal reflux disease); Closed fracture of left distal radius; Other secondary hypertension; Subarachnoid bleed ([address] Closed fracture of neck of right femur ([address] Intracranial bleed ([address] Hip fracture, right, closed, initial encounter ([address] Metabolic encephalopathy; Acute metabolic encephalopathy; Stroke-like symptoms; and Acute cystitis without hematuria on their problem list.  ONSET DATE: 22    Recent Chest Xray not completed    MRI brain results 22      1.  Large acute left MCA territory infarct. Small acute right frontal lobe infarct. 2.  Stable small volume bilateral subarachnoid hemorrhage better seen on prior CT. 3.  Right frontal and left cerebral convexity subdural collections are not well evaluated due to extensive motion artifact on FLAIR sequence but appear to represent subdural effusions or less likely hygromas given the presence of leakage of contrast on    prior CT. Stable 3 mm rightward midline shift.       Discussed with the patient's nurse Ana. Date of Eval: 2022  Evaluating Therapist: BRENT Rojas    Current Diet level:  Current Diet : NPO    Primary Complaint  Patient Complaint: not able to state    Pain:  Pain Assessment  Pain Assessment: pt does not appear in pain    Reason for Referral  King Callejas was referred for a bedside swallow evaluation to assess the efficiency of her swallow function, identify signs and symptoms of aspiration and make recommendations regarding safe dietary consistencies, effective compensatory strategies, and safe eating environment. Admission HPI:   Per MD notes  \"Sayda Bhatt is a 80 y. o. y/o female with a history of recent ICH including tSAH, frequent falls who presents with acute onset of aphasia and right hemiparesis which was first noted at 5 PM 6/11.  Symptoms were severe. CT head showed increasing left SDH. Her brothers and sister in law at bedside report they haven't seen her in a week, but she was living alone, capable of talking/walking, mowing the grass a week ago. Per last neurosurgery note, she was following commands with clear speech, intact EOM, and good strength throughout (6/1/22). At the time of last neurosurgery note it was recommended antiplatelets and anticoagulation be held for 2 weeks. She has UTI and DENICE this admission. \"    Impression  New order received to re-assess pt due to change in status. Pt was known to [facility] when followed as in pt and has been on ARU. Pt had no dysphagia and was on regular diet. Pt had acute change in status on 6/11 with MRi showing large MCA infarct and small right frontal infarct. RN reports pt non verbal and not following any commands. Pt opened eyes to SLP voice, did not follow any commands or communicate through any means. Pt exhibiting significant right neglect and right facial asymmetry. Provided oral care, pt with minimal response to this. Pt then presented with ice chips. Pt did not remove from spoon or demonstrate labial seal around spoon. Mild anterior loss of liquid occurred. No overt signs of aspiration emerged, however no swallow movement felt. Pt not able to maintain alertness, therefore did not present additional trials  Recommend cont NPO with aggressive oral care with suction kit. Plan to re-assess bedside as pt able  Dysphagia Diagnosis: Suspected needs further assessment  Dysphagia Outcome Severity Scale: Level 1: Severe dysphagia- NPO. Unable to tolerate any PO safely     Treatment Plan  Requires SLP Intervention: Yes  D/C Recommendations:  To be determined    Recommended Diet and Intervention  Cont NPO  Oral care 2-3 x/day with suction kit     Recommended Form of Meds: Via alternative means of nutrition     Therapeutic Interventions: Oral care; Patient/Family education    Compensatory Swallowing Strategies  n/a    Treatment/Goals  1-The patient will tolerate repeat bedside swallowing evaluation when able. General  Chart Reviewed: Yes  Behavior/Cognition: Doesn't follow directions; Lethargic  Respiratory Status: Room air  Communication Observation: Non-verbal  Follows Directions: None  Patient Positioning: Upright in bed  Baseline Vocal Quality:  (no vocalizations produced)  Volitional Cough:  (did not follow direction)  Prior Dysphagia History: pt was been seen by Alejandro for dysphagia assessment when an in pt and . pt was on regular diet. Pt has been followed on ARU for cognition    Vision/Hearing  Vision  Vision: Impaired  Vision Exceptions: Wears glasses at all times  Hearing  Hearing: Exceptions to [facility]  Hearing Exceptions: Hard of hearing/hearing concerns    Oral Motor Deficits  Significant right facial asymmetry/weakness    Prognosis  Individuals consulted  Consulted and agree with results and recommendations: Patient;RN;Physician  RN Name: Hubert Mccoy    Education  Patient Education: Pt educated to purpose of visit  Patient Education Response: No evidence of learning  Safety Devices in place: Yes  Type of devices: Call light within reach      Plan;  · Recommended diet: NPO  · Aggressive oral care 2-3 x/day with suction kit  · Re-assessment bedside as pt able to participate  Dc recommendation: will follow up with speech /language eval as pt able to participate. Palliative care is involved  Pt therapy goal: not able to state  Pt dc goal: not able to state  Karla Dubon M.S./[facility]-SLP #[id number]  Pg.  # [phone]  Needs met prior to leaving room, call light within reach, d/w ARLEEN Mccoy  This document will serve as a dc summary if pt dc prior to next visit  6/13/2022 12:14 PM

## 2022-06-13 NOTE — FLOWSHEET NOTE
06/13/22 0927   Encounter Summary   Encounter Overview/Reason    (spent time with patient and family on ARU. Follow up in response to report from nurses on ARU)   Service Provided For: Patient   Referral/Consult From: Nurse  (ARU nurse)   Nato Davis  (daughter-in-law is like a daughter to her)   Last Encounter    (es 6/13)   Complexity of Encounter Low   Begin Time 0915   End Time  0930   Total Time Calculated 15 min   Spiritual/Emotional needs   Type Other (comment); Spiritual Support  (when pt was on ARU, she greatly appreciated prayer)   Assessment/Intervention/Outcome   Assessment Passive   Intervention Prayer (assurance of)/[place];Sustaining Presence/Ministry of presence   Outcome Comfort   Plan and Referrals   Plan/Referrals Continue to visit, (comment)  (will keep an eye out for family.   nurse can page)

## 2022-06-13 NOTE — PROGRESS NOTES
Patient's MRI result via phone call: Large Left MCA territory infarct and stable bleeding. MD and charge nurse notified.

## 2022-06-13 NOTE — PROGRESS NOTES
Long discussion with patient's daughter-in-law, Skylar Street. Will await SLP eval, as well as palliative care consult. Family is open to hospice if she is not felt to be likely to improve to the point of living independently (which we discussed was high likelihood). She tells me family will be in and out of Sayda's room all day, and I notified her to call if she should want to discuss Sayda's case with me in person I would be happy to stop by.    Tere Leavitt NP  Neurology

## 2022-06-13 NOTE — PROGRESS NOTES
Hospitalist Progress Note      PCP: Georgia Johnson MD    Date of Admission: 6/11/2022    Chief Complaint: confusion    Hospital Course: Logan Rubio 80 y.o. female history of frequent falls, hypertension, history of CVA, recent hospitalization after a fall where she was noted to have ICH/SDH also noted to have closed fracture of neck of right femoral.  Was transferred to ARU for rehab on 6/7. Rapid response was called on 6/11 evening patient was confused she was not following any command. CT head increasing left-sided subdural hematoma. Remote ischemic changes in the left cerebral lesion appears stable    Patient transferred to regular floor for concern of seizure and change in mentation. MRI brain which acute left MCA stroke    Subjective: Patient seen and examined today. Nonverbal.  Not following any commands. Working with  Speech therapy. Medications:  Reviewed    Infusion Medications    sodium chloride Stopped (06/12/22 2240)    sodium chloride 125 mL/hr at 06/13/22 0558     Scheduled Medications    levETIRAcetam  500 mg IntraVENous Q12H    sodium chloride flush  5-40 mL IntraVENous 2 times per day    cefTRIAXone (ROCEPHIN) IV  1,000 mg IntraVENous Q24H     PRN Meds: perflutren lipid microspheres, sodium chloride flush, sodium chloride, ondansetron **OR** ondansetron, polyethylene glycol, acetaminophen **OR** acetaminophen      Intake/Output Summary (Last 24 hours) at 6/13/2022 1235  Last data filed at 6/13/2022 0600  Gross per 24 hour   Intake 0 ml   Output 150 ml   Net -150 ml       Physical Exam Performed:    BP (!) 111/41   Pulse 68   Temp 98.2 °F (36.8 °C) (Axillary)   Resp 20   Ht 5' 8\" (1.727 m)   Wt 123 lb 7.3 oz (56 kg)   SpO2 98%   BMI 18.77 kg/m²     General appearance: Elderly, confused   HEENT: Pupils equal, round, and reactive to light. Conjunctivae/corneas clear. Neck: Supple, with full range of motion. No jugular venous distention.  Trachea midline. Respiratory:  Normal respiratory effort. Clear to auscultation, bilaterally without Rales/Wheezes/Rhonchi. Cardiovascular: Regular rate and rhythm with normal S1/S2 without murmurs, rubs or gallops. Abdomen: Soft, non-tender, non-distended with normal bowel sounds. Musculoskeletal: No clubbing, cyanosis or edema bilaterally. Skin: Ecchymosis around right thigh. Neurologic: Alert oriented to self moving all extremities spontaneously sometimes following commands with grasping hand. Mild facial droop. Left gaze  Psychiatric: Exam limited due to clinical condition   capillary Refill: Brisk,< 3 seconds   Peripheral Pulses: +2 palpable, equal bilaterally       Labs:   Recent Labs     06/11/22 1757 06/12/22 0454 06/13/22  0519   WBC 6.3 8.0 6.4   HGB 11.5* 11.6* 11.7*   HCT 34.5* 34.1* 34.8*    246 236     Recent Labs     06/11/22 1757 06/12/22 0454 06/13/22  0519   * 136 136   K 4.8 4.5 5.0   CL 98* 101 104   CO2 25 24 22   BUN 33* 28* 23*   CREATININE 1.3* 1.3* 1.1   CALCIUM 8.9 9.1 8.5     No results for input(s): AST, ALT, BILIDIR, BILITOT, ALKPHOS in the last 72 hours. Recent Labs     06/11/22 1757   INR 1.11     No results for input(s): Connee Matar in the last 72 hours. Urinalysis:      Lab Results   Component Value Date    NITRU POSITIVE 06/08/2022    WBCUA 6-9 06/08/2022    BACTERIA 4+ 06/08/2022    RBCUA 3-4 06/08/2022    BLOODU TRACE-INTACT 06/08/2022    SPECGRAV 1.015 06/08/2022    GLUCOSEU Negative 06/08/2022       Radiology:  MRI BRAIN WO CONTRAST   Final Result      1. Large acute left MCA territory infarct. Small acute right frontal lobe infarct. 2.  Stable small volume bilateral subarachnoid hemorrhage better seen on prior CT.    3.  Right frontal and left cerebral convexity subdural collections are not well evaluated due to extensive motion artifact on FLAIR sequence but appear to represent subdural effusions or less likely hygromas given the presence of leakage of contrast on    prior CT. Stable 3 mm rightward midline shift. Discussed with the patient's nurse [facility]. CT HEAD WO CONTRAST   Final Result      1. Unchanged small volume acute subarachnoid hemorrhage in right frontoparietal and bilateral occipital lobe sulci. 2.  Unchanged size of right frontal convexity and left cerebral convexity subdural collections with new diffusely increased density secondary to interval CT angiogram. These may indicate chronic subdural effusions or hygromas rather than chronic subdural    hematomas due to the leakage of contrast.   3.  Unchanged 3 mm rightward midline shift. Assessment/Plan:    Active Hospital Problems    Diagnosis Date Noted    Acute cystitis without hematuria [N30.00] 06/12/2022     Priority: Medium    Acute metabolic encephalopathy [P69.54] 06/11/2022     Priority: Medium    Stroke-like symptoms [R29.90] 06/11/2022     Priority: Medium    Subarachn[address] bleed ([facility] [I60.9] 05/30/2022     Priority: Medium     #acute left MCA stroke  Not on antiplatelet due to recent ICH. CTA no LVOT. Patient had echo done on 5/31 did not showed any PFO. . HbA1c 5.4%. Due to size of stroke and advanced age family is planning to meet with palliative care today. #Acute metabolic encephalopathy secondary to CVA  Delirium precautions  Neurochecks. Seizure precautions. Continue Keppra 500 twice daily. #Slightly worsening of subdural hematoma. Repeat CT head showed a stable hematoma. No AC/AP until cleared by neurosurgery. PT/OT eval.    #UTI  Patient was diagnosed with UTI on 6/8 cultures positive for E. coli. She was on Bactrim. Currently on Rocephin day 2. Will repeat UA. #DENICE likely SHANTE  Patient did received contrast yesterday. Continue IV fluid hydration. Avoid nephrotoxic agent. Monitor creatinine    #Closed fracture of the neck of the right femoral after a fall.   Orthopedic was consulted on prior admission recommended conservative treatment. DVT Prophylaxis: SCD  Diet: Diet NPO  Code Status: Limited    PT/OT Eval Status: Pending evaluation    Dispo -inpatient. Family has been planning to meet with palliative care team today.     Zak Jackson MD     This chart was likely completed using voice recognition technology and may contain unintended grammatical , phraseology,and/or punctuation errors

## 2022-06-13 NOTE — PROGRESS NOTES
Neurologist notified via phone call about the MRI result, plan of care related to findings will be discussed with the family in the am as per Neuro.

## 2022-06-13 NOTE — DISCHARGE SUMMARY
Physical Medicine & Rehabilitation  Discharge Summary     Patient Identification:  Tiffany Dias  : 1934  Admit date: 2022  Discharge date: 2022   Attending provider: Minh Mathis DO    Primary care provider: Ginny Scherer MD     Discharge Diagnoses:   Patient Active Problem List   Diagnosis    Hyperlipidemia    GERD (gastroesophageal reflux disease)    Closed fracture of left distal radius    Other secondary hypertension    Subarachnoid bleed ([address]    Closed fracture of neck of right femur ([address]    Intracranial bleed ([address]    Hip fracture, right, closed, initial encounter ([address]    Metabolic encephalopathy    Acute metabolic encephalopathy    Stroke-like symptoms    Acute cystitis without hematuria       History of Present Illness/Acute Hospital Course:  Tiffany Dias is an 51-year-old female with HTN, history prior lacunar stroke, HLD.presents to the hospital after a fall at a landrymat. She was found down on the floor. Initially presented to [facility], where her CT head showed SAH. She was then transferred to [facility] for neurosurgery consultation.      At baseline patient is slightly confused, ambulates with a peña. She also endorses multiple falls. Repeat CT head showed stable bleed. Has been able to participate with PT/OT. Able to tolerate regular diet. Inpatient Rehabilitation Course:   Tiffany Dias is a 80 y.o. female admitted to inpatient rehabilitation on 2022 with Hip fracture, right, closed, initial encounter ([address] The patient participated in an aggressive multidisciplinary inpatient rehabilitation program involving 3 hours of therapy per day, at least 5 days per week. On  she became non verbal. CT showed extension of her CVA. She was sent back to acute care for management. Impairments: Decreased functional mobility     Medical Management:  1.  Acute debilitation secondary to right hip fracture  - Right femoral neck fracture with conservative treatment  - PT/OT     2. Subarachnoid hemorrhage secondary to fall   - CT head showed   - Repeat CT head showed stable bleed  - Neurosurgery signed off as no intervention indicated  - Will need repeat head CT in 2 weeks   - Keppra 500 mg BID   - SBP goal < 180  - PT/OT   - Pain control     3. Close fracture of the neck of the right femoral  - s/p fall   - Orthopedics recommended conservative treatment   - PT/OT     4. UTI  - E. Coli positive on urine culture, foul smell   - avoid Cipro as has allergy to FLQ  - TMP//800mg BID for 5 days    Discharge Exam:  Constitutional: confused, non verbal   Head: Normocephalic, atruamatic, MMM  Eyes: Conjunctiva noninjected, no icterus, no drainage  Pulm: CTA bilat. Respirations non-labored. CV: No murmurs noted. RRR. Abd: Soft, nontender. NABS+  Ext: No edema, no varicosities  Neuro: non verbal   MSK: No joint abnormalities noted       Discharge Functional Status:    Physical therapy:  Bed Mobility: Scooting: Contact guard assistance (seated EOB)  Transfers: Sit to Stand: Minimal Assistance,Moderate Assistance (fluctuates fluctuates min<>mod at EOB/wc, min A at raised toilet seat ; all with RW)  Stand to sit: Contact guard assistance,Minimal Assistance (completed at [facility].  min A d/t decreased eccentric control, CGA with increased VC for eccentric control), Ambulation  Device: Rolling Walker  Assistance: Contact guard assistance,Minimal assistance (CGA with occasional min A required for RW placement)  Quality of Gait: slow, effortful, flexed trunk, decreased step height/length B LE, shuffling steps, step to pattern with increased step length  Distance: 15' x2, small distances in bathroom  Comments: VC for WBing status, increased step length, and safe RW placement,    Mobility:  , PT Equipment Recommendations  Equipment Needed: Yes  Mobility Devices: Lexis Wells: Rolling, Assessment: Pt demonstrates improved endurance and activity tolerance through increased ambulation distance. Continues to require CGA for ambulation on level surfaces with RW and CGA-mod for transfers. Pt would benefit from continued skilled PT in order to porgress towards PLOF and independence. Occupational therapy:  ,  , Assessment: Pt is an 79 yo female who presents to [place] [address] 2/2 fall resulting in R femoral neck fracture and SAH. Prior to admission pt reported she was independent w/ all ADLs and she was using a cane. Pt is now limited by impaired balance, impaired cognition, decreased activity tolerance, and decreased strength and pt is functioning well below her baseline as a result. Pt benefits from OT in order to maximize functional independence. Significant Diagnostics:   Lab Results   Component Value Date    CREATININE 1.1 06/13/2022    BUN 23 (H) 06/13/2022     06/13/2022    K 5.0 06/13/2022     06/13/2022    CO2 22 06/13/2022       Lab Results   Component Value Date    WBC 6.4 06/13/2022    HGB 11.7 (L) 06/13/2022    HCT 34.8 (L) 06/13/2022    MCV 96.1 06/13/2022     06/13/2022       Disposition:  Acute care      Discharge Condition: Stable    Follow-up:  See after visit summary from hospitalization    Discharge Medications:     Medication List      ASK your doctor about these medications    CALCIUM GUMMIES PO     levETIRAcetam 500 MG tablet  Commonly known as: Keppra  Take 1 tablet by mouth 2 times daily for 14 days              I spent over 35 minutes on this discharge encounter between counseling, coordination of care, and medication reconciliation. To comply with [facility] bylaw R.II.4.1:   Discharge order placed in advance to facilitate patients discharge needs.       Hair Rucker,

## 2022-06-13 NOTE — PLAN OF CARE
Pt response to voice, non-verbal, and follow commands with delays (at 0346 neuro assessment, see flowsheet). Right pupil sluggish while right side of the body remained weak and spatic. VSS, afebrile, SPO2 >90 on RA. Fall and seizure precautions in place. Problem: Confusion  Goal: Confusion, delirium, dementia, or psychosis is improved or at baseline  Description: INTERVENTIONS:  1. Assess for possible contributors to thought disturbance, including medications, impaired vision or hearing, underlying metabolic abnormalities, dehydration, psychiatric diagnoses, and notify attending LIP  2. [place] high risk fall precautions, as indicated  3. Provide frequent short contacts to provide reality reorientation, refocusing and direction  4. Decrease environmental stimuli, including noise as appropriate  5. Monitor and intervene to maintain adequate nutrition, hydration, elimination, sleep and activity  6. If unable to ensure safety without constant attention obtain sitter and review sitter guidelines with assigned personnel  7.  Initiate Psychosocial CNS and Spiritual Care consult, as indicated  6/13/2022 0150 by Norman Hooper RN  Outcome: Progressing  Flowsheets (Taken 6/12/2022 1953)  Effect of thought disturbance (confusion, delirium, dementia, or psychosis) are managed with adequate functional status:   Assess for contributors to thought disturbance, including medications, impaired vision or hearing, underlying metabolic abnormalities, dehydration, psychiatric diagnoses, notify [address] high risk fall precautions, as indicated   Provide frequent short contacts to provide reality reorientation, refocusing and direction   Decrease environmental stimuli, including noise as appropriate   Monitor and intervene to maintain adequate nutrition, hydration, elimination, sleep and activity     Problem: Chronic Conditions and Co-morbidities  Goal: Patient's chronic conditions and co-morbidity symptoms are monitored and maintained or improved  6/13/2022 0150 by Wilberto Hinson RN  Outcome: Progressing  Flowsheets (Taken 6/12/2022 1953)  Care Plan - Patient's Chronic Conditions and Co-Morbidity Symptoms are Monitored and Maintained or Improved:   Monitor and assess patient's chronic conditions and comorbid symptoms for stability, deterioration, or improvement   Collaborate with multidisciplinary team to address chronic and comorbid conditions and prevent exacerbation or deterioration   Update acute care plan with appropriate goals if chronic or comorbid symptoms are exacerbated and prevent overall improvement and discharge     Problem: ABCDS Injury Assessment  Goal: Absence of physical injury  Outcome: Progressing  Flowsheets (Taken 6/12/2022 1932)  Absence of Physical Injury: Implement safety measures based on patient assessment     Problem: Safety - Adult  Goal: Free from fall injury  6/13/2022 0150 by Wilberto Hinson RN  Outcome: Progressing  Flowsheets (Taken 6/12/2022 1932)  Free From Fall Injury: Instruct family/caregiver on patient safety     Problem: Skin/Tissue Integrity  Goal: Absence of new skin breakdown  Description: 1. Monitor for areas of redness and/or skin breakdown  2. Assess vascular access sites hourly  3. Every 4-6 hours minimum:  Change oxygen saturation probe site  4. Every 4-6 hours:  If on nasal continuous positive airway pressure, respiratory therapy assess nares and determine need for appliance change or resting period.   Outcome: Progressing     Problem: Pain  Goal: Verbalizes/displays adequate comfort level or baseline comfort level  Outcome: Progressing     Problem: Discharge Planning  Goal: Discharge to home or other facility with appropriate resources  Outcome: Progressing  Flowsheets (Taken 6/12/2022 1953)  Discharge to home or other facility with appropriate resources:   Identify barriers to discharge with patient and caregiver   Arrange for needed discharge resources and transportation as appropriate   Identify discharge learning needs (meds, wound care, etc)   Arrange for interpreters to assist at discharge as needed

## 2022-06-13 NOTE — PLAN OF CARE
Conditions and Co-Morbidity Symptoms are Monitored and Maintained or Improved: Monitor and assess patient's chronic conditions and comorbid symptoms for stability, deterioration, or improvement     Problem: Confusion  Goal: Confusion, delirium, dementia, or psychosis is improved or at baseline  Description: INTERVENTIONS:  1. Assess for possible contributors to thought disturbance, including medications, impaired vision or hearing, underlying metabolic abnormalities, dehydration, psychiatric diagnoses, and notify attending LIP  2. [place] high risk fall precautions, as indicated  3. Provide frequent short contacts to provide reality reorientation, refocusing and direction  4. Decrease environmental stimuli, including noise as appropriate  5. Monitor and intervene to maintain adequate nutrition, hydration, elimination, sleep and activity  6. If unable to ensure safety without constant attention obtain sitter and review sitter guidelines with assigned personnel  7.  Initiate Psychosocial CNS and Spiritual Care consult, as indicated  6/13/2022 1959 by Gamaliel Castellanos RN  Outcome: Progressing  6/13/2022 1151 by Gamaliel Castellanos RN  Outcome: Progressing  Flowsheets (Taken 6/13/2022 0904)  Effect of thought disturbance (confusion, delirium, dementia, or psychosis) are managed with adequate functional status:   Assess for contributors to thought disturbance, including medications, impaired vision or hearing, underlying metabolic abnormalities, dehydration, psychiatric diagnoses, notify [facility] hospital high risk fall precautions, as indicated   Decrease environmental stimuli, including noise as appropriate     Problem: Neurosensory - Adult  Goal: Achieves stable or improved neurological status  Outcome: Progressing  Goal: Achieves maximal functionality and self care  Outcome: Progressing

## 2022-06-13 NOTE — PROGRESS NOTES
NEUROLOGY / NEUROCRITICAL CARE PROGRESS NOTE       Patient Name: He Campos YOB: 1934   Sex: Female Age: 80 yrs     CC / Reason for Consult: AMS    Interval Hx / Changes over last 24 hours:   MRI confirms large left MCA stroke  EEG discontinued given the above  Exam unchanged. No family at bedside. Palliative care on board. ROS: +aphasia, +weakness    HISTORY   Admission HPI:   He Campos is a 80 y.o. y/o female with a history of recent ICH including tSAH, frequent falls who presents with acute onset of aphasia and right hemiparesis which was first noted at 5 PM 6/11. Symptoms were severe. CT head showed increasing left SDH.      Her brothers and sister in law at bedside report they haven't seen her in a week, but she was living alone, capable of talking/walking, mowing the grass a week ago. Per last neurosurgery note, she was following commands with clear speech, intact EOM, and good strength throughout (6/1/22). At the time of last neurosurgery note it was recommended antiplatelets and anticoagulation be held for 2 weeks. She has UTI and DENICE this admission.     PMH Past Medical History:   Diagnosis Date    Allergic rhinitis     GERD (gastroesophageal reflux disease)     Hyperlipidemia     Laceration of head 06/23/2016      Allergies Allergies   Allergen Reactions    Levofloxacin Nausea Only    Atorvastatin Other (See Comments)      Diet Diet NPO   Isolation No active isolations     CURRENT SCHEDULED MEDICATIONS   Inpatient Medications     levETIRAcetam, 1,000 mg, IntraVENous, Q12H    sodium chloride flush, 5-40 mL, IntraVENous, 2 times per day    cefTRIAXone (ROCEPHIN) IV, 1,000 mg, IntraVENous, Q24H   Infusions    sodium chloride Stopped (06/12/22 2240)    sodium chloride 125 mL/hr at 06/13/22 0558      Antibiotics   Recent Abx Admin                   cefTRIAXone (ROCEPHIN) 1000 mg IVPB in 50 mL D5W minibag (mg) 1,000 mg New Bag 06/12/22 2119                  LABS Metabolic Panel Recent Labs     06/11/22 1757 06/12/22 0454 06/13/22  0519   * 136 136   K 4.8 4.5 5.0   CL 98* 101 104   CO2 25 24 22   BUN 33* 28* 23*   CREATININE 1.3* 1.3* 1.1   GLUCOSE 93 98 81   CALCIUM 8.9 9.1 8.5   MG 2.30  --   --       CBC / Coags Recent Labs     06/11/22 1757 06/12/22 0454 06/13/22  0519   WBC 6.3 8.0 6.4   RBC 3.60* 3.62* 3.63*   HGB 11.5* 11.6* 11.7*   HCT 34.5* 34.1* 34.8*    246 236   INR 1.11  --   --       Other   Recent Labs     06/11/22 1757   LACTA 1.0        DIAGNOSTICS   IMAGES:  Images personally reviewed and agree w/ radiology interpretation. MRI brain w/o mary 6/12/22  Impression       1.  Large acute left MCA territory infarct. Small acute right frontal lobe infarct. 2.  Stable small volume bilateral subarachnoid hemorrhage better seen on prior CT. 3.  Right frontal and left cerebral convexity subdural collections are not well evaluated due to extensive motion artifact on FLAIR sequence but appear to represent subdural effusions or less likely hygromas given the presence of leakage of contrast on    prior CT. Stable 3 mm rightward midline shift. Head CT w/o Contrast:  1.  Unchanged small volume acute subarachnoid hemorrhage in right frontoparietal and bilateral occipital lobe sulci. 2.  Unchanged size of right frontal convexity and left cerebral convexity subdural collections with new diffusely increased density secondary to interval CT angiogram. These may indicate chronic subdural effusions or hygromas rather than chronic subdural    hematomas due to the leakage of contrast.   3.  Unchanged 3 mm rightward midline shift.        CT-A head and neck  1.  No aneurysms, vascular occlusions, or intracranial stenoses identified. 2.  No significant stenosis in the extracranial vertebral or carotid arteries.      May 2022 Echo: EF 55-60%. No right to left shunt.      PHYSICAL EXAMINATION     PHYSICAL EXAM:  Vitals:    06/13/22 0600 06/13/22 0901 06/13/22 0904 06/13/22 0936   BP:   (!) 111/41    Pulse: 53 58 62 68   Resp:  18 20    Temp:   98.2 °F (36.8 °C)    TempSrc:   Axillary    SpO2:  95% 100% 98%   Weight:       Height:           Exam  General: Drowsy, no distress, well-nourished  Neurologic  Mental status: drowsy; eyes open to voice aphasic  orientation unable to assess given aphasia              Attention right hemineglect              Language aphasic              Comprehension does not follow any commands     Cranial nerves:   CN2: no blink to threat on the right  CN 3,4,6: Pupils equal and reactive to light, leftward gaze. Able to come to midline but does not cross midline  CN5: Facial sensation NINA given aphasia   CN7: right facial weakness  CN8: she appears to hear my voice     Motor Exam:    R  L    Deltoid 1  3   Biceps 1 3   Triceps 1 3   Wrist extension  1 3   Interossei 1 3        R  L    Hip flexion  2 3   Hip extension  2 3   Knee flexion  2 3   Knee extension  2 3   Ankle dorsiflexion  2 3   Ankle plantar flexion  2 3      Deep tendon reflexes: limited given cooperation     Sensory: localizes to pain on the left  Cerebellar/coordination: NINA given not following commands  Tone: increased on the right. Normal on the left. Gait: deferred for safety      OTHER SYSTEMS:  Cardiovascular: Warm, appears well perfused   Respiratory: Easy, non-labored respiratory pattern   Abdominal: Abdomen is without distention   Extremities: Upper and lower extremities are atraumatic in appearance without deformity. No swelling or erythema. ASSESSMENT & RECOMMENDATIONS   Assessment:  Ms. Angelina Turk is an 79 y/o lady with recent (5/30) mild TBI, scattered tSAH and chronic-appearing left cerebral convexity SDH who presents with acute & severe LEFT MCA syndrome found to have large left MCA stroke. CT-A showed no LVO or significant stenosis.      RECOMMENDATIONS:  - Palliative care consultation.  I worry about her ability to swallow and sustain herself from a nutrition standpoint going forward given her advanced age and volume of infarct.  - SLP eval.  - Reduce Keppra to prior dose for seizure prophylaxis following SAH  - Will discuss resumption of antiplatelet with attending neurologist. Initial ICH was 14 days ago. I suspect she may have occult atrial fibrillation given her age, lack of stroke risk factors, and embolic appearance of stroke on MRI.   - SBP < 180 for now   - Continue telemetry and notify neurology of any atrial fibrillation. Will obtain echo to eval for interval changes. Ernestina Antonio, APRN - CNP   Neurology & Neurocritical Care   Neurology Line: [phone]  [facility]: [facility] Neurology & Neuro Critical Care NPs  6/13/2022 9:54 AM    I spent 35 minutes in the care of this patient. Over 50% of that time was in face-to-face counseling regarding disease process, diagnostic testing, preventative measures, and answering patient and family questions.

## 2022-06-13 NOTE — CARE COORDINATION
CM made a referral to [facility] per family request.  CM spoke with [facility] in intake for referral information, faxed referral to [phone]. They will reach out to family to make an appointment.     [facility] nurse will meet with family tomorrow 6/14/22 at 1000 here at the hospital.    Anitha Viramontes, RN, BSN,   4th Floor Progressive Care Unit  [phone]

## 2022-06-14 VITALS
OXYGEN SATURATION: 99 % | BODY MASS INDEX: 18.71 KG/M2 | RESPIRATION RATE: 18 BRPM | SYSTOLIC BLOOD PRESSURE: 145 MMHG | DIASTOLIC BLOOD PRESSURE: 56 MMHG | WEIGHT: 123.46 LBS | HEART RATE: 63 BPM | HEIGHT: 68 IN | TEMPERATURE: 98.2 F

## 2022-06-14 PROCEDURE — 2580000003 HC RX 258: Performed by: INTERNAL MEDICINE

## 2022-06-14 PROCEDURE — 6360000002 HC RX W HCPCS: Performed by: NURSE PRACTITIONER

## 2022-06-14 PROCEDURE — 6370000000 HC RX 637 (ALT 250 FOR IP): Performed by: INTERNAL MEDICINE

## 2022-06-14 RX ORDER — LORAZEPAM 2 MG/ML
1 CONCENTRATE ORAL
Status: DISCONTINUED | OUTPATIENT
Start: 2022-06-14 | End: 2022-06-14 | Stop reason: HOSPADM

## 2022-06-14 RX ORDER — MORPHINE SULFATE 20 MG/ML
5 SOLUTION ORAL
Status: DISCONTINUED | OUTPATIENT
Start: 2022-06-14 | End: 2022-06-14 | Stop reason: HOSPADM

## 2022-06-14 RX ADMIN — MORPHINE SULFATE 2 MG: 2 INJECTION, SOLUTION INTRAMUSCULAR; INTRAVENOUS at 10:43

## 2022-06-14 RX ADMIN — SODIUM CHLORIDE: 9 INJECTION, SOLUTION INTRAVENOUS at 06:58

## 2022-06-14 RX ADMIN — MORPHINE SULFATE 2 MG: 2 INJECTION, SOLUTION INTRAMUSCULAR; INTRAVENOUS at 12:57

## 2022-06-14 RX ADMIN — LORAZEPAM 0.5 MG: 2 INJECTION INTRAMUSCULAR; INTRAVENOUS at 11:42

## 2022-06-14 RX ADMIN — Medication 1 MG: at 14:55

## 2022-06-14 RX ADMIN — MORPHINE SULFATE 2 MG: 2 INJECTION, SOLUTION INTRAMUSCULAR; INTRAVENOUS at 14:30

## 2022-06-14 ASSESSMENT — PAIN SCALES - PAIN ASSESSMENT IN ADVANCED DEMENTIA (PAINAD)
FACIALEXPRESSION: 0
FACIALEXPRESSION: 0
TOTALSCORE: 0
BREATHING: 0
BODYLANGUAGE: 0
BODYLANGUAGE: 0
CONSOLABILITY: 0
NEGVOCALIZATION: 0
FACIALEXPRESSION: 0
BREATHING: 0
TOTALSCORE: 0
BODYLANGUAGE: 0
CONSOLABILITY: 0
BODYLANGUAGE: 0
BODYLANGUAGE: 0
BREATHING: 0
FACIALEXPRESSION: 0
CONSOLABILITY: 0
TOTALSCORE: 0
NEGVOCALIZATION: 0
NEGVOCALIZATION: 0
CONSOLABILITY: 0
CONSOLABILITY: 0
BREATHING: 0
TOTALSCORE: 0
FACIALEXPRESSION: 0
NEGVOCALIZATION: 0
NEGVOCALIZATION: 0
BREATHING: 0
BREATHING: 0
BODYLANGUAGE: 0
FACIALEXPRESSION: 0
BODYLANGUAGE: 0
TOTALSCORE: 0
TOTALSCORE: 0
NEGVOCALIZATION: 0
FACIALEXPRESSION: 0
BREATHING: 0
NEGVOCALIZATION: 0
TOTALSCORE: 0

## 2022-06-14 ASSESSMENT — PAIN SCALES - WONG BAKER
WONGBAKER_NUMERICALRESPONSE: 0

## 2022-06-14 ASSESSMENT — PAIN SCALES - GENERAL
PAINLEVEL_OUTOF10: 0
PAINLEVEL_OUTOF10: 1
PAINLEVEL_OUTOF10: 0

## 2022-06-14 NOTE — DISCHARGE INSTR - COC
Continuity of Care Form    Patient Name: Bravo Pierce   :  1934  MRN:  [id number]    Admit date:  2022  Discharge date:  ***    Code Status Order: DNR-CC   Advance Directives:      Admitting Physician:  Zak Jackson MD  PCP: Royal Neptali MD    Discharging Nurse: [facility] Unit/Room#: 1421/9567-72  Discharging Unit Phone Number: ***    Emergency Contact:   Extended Emergency Contact Information  Primary Emergency Contact: [address] of [address] [place] [address] Phone: [phone]  Relation: Child  Secondary Emergency Contact: Huong Bhatt  Address: [address] [place] [address] Phone: [phone]  Relation: Child    Past Surgical History:  Past Surgical History:   Procedure Laterality Date    APPENDECTOMY      COLONOSCOPY      FOREARM SURGERY Left 8/10/2020    OPEN REDUCTION INTERNAL FIXATION LEFT DISTAL RADIUS with mini c-arm performed by Megan Gipson MD at [address]  2010    left Ulna    KNEE SURGERY      left torn meniscus       Immunization History:   Immunization History   Administered Date(s) Administered    Td, unspecified formulation 2016    Zoster Live (Zostavax) 2007       Active Problems:  Patient Active Problem List   Diagnosis Code    Hyperlipidemia E78.5    GERD (gastroesophageal reflux disease) K21.9    Closed fracture of left distal radius S52.502A    Other secondary hypertension I15.8    Subarachnoid bleed ([address] I60.9    Closed fracture of neck of right femur ([address] S72.001A    Intracranial bleed ([address] I62.9    Hip fracture, right, closed, initial encounter ([address] E30.490J    Metabolic encephalopathy P79.11    Acute metabolic encephalopathy P78.01    Stroke-like symptoms R29.90    Acute cystitis without hematuria N30.00    Delirium R41.0    Encounter for palliative care Z51.5    Encounter for hospice care discussion Z71.89       Isolation/Infection:   Isolation            No Isolation          Patient Infection Status

## 2022-06-14 NOTE — CARE COORDINATION
Case Management Assessment            Discharge Note                    Date / Time of Note: 6/14/2022 12:07 PM                  Discharge Note Completed by: Cynthia Chairez RN    Patient Name: David Mera   YOB: 1934  Diagnosis: Acute metabolic encephalopathy [K06.79]  Seizure [facility]) [R56.9]   Date / Time: 6/11/2022  9:12 PM    Current PCP: Adriel Jones MD  Clinic patient: No    Hospitalization in the last 30 days: Yes    Advance Directives:  Code Status: DNR-CC  [address] [place] [address] Dr DNR form completed and on chart: Yes    Financial:  Payor: Nomi Peer / Plan: [facility] / Product Type: *No Product type* /      Pharmacy:    [address] [place] [address]  [address] [address]  Phone: [phone] Fax: [phone]    [facility] [id number] [place] [id number] [facility] [phone]  [address] [place] [address]  Phone: [phone] Fax: [phone] [facility] [id number] - [facility], [address] [facility] [phone] - f [phone]  [address] [place] [address]  Phone: [phone] Fax: [phone]    [facility] [address] [place] [address],[place] [address] [phone] - f [phone]  [address] [place] [address]  Phone: [phone] Fax: [phone]      Assistance purchasing medications?: Potential Assistance Purchasing Medications: No  Assistance provided by Case Management: None at this time    Does patient want to participate in local refill/ meds to beds program?: Yes    Meds To Beds General Rules:  1. Can ONLY be done Monday- Friday between 8:30am-5pm  2. Prescription(s) must be in pharmacy by 3pm to be filled same day  3. Copy of patient's insurance/ prescription drug card and patient face sheet must be sent along with the prescription(s)  4. Cost of Rx cannot be added to hospital bill.  If financial assistance is needed, please contact unit  or ;  or  CANNOT provide pharmacy voucher for patients co-pays  5. Patients can then  the prescription on their way out of the hospital at discharge, or pharmacy can deliver to the bedside if staff is available. (payment due at time of pick-up or delivery - cash, check, or card accepted)     Able to afford home medications/ co-pay costs: Yes    ADLS:  Current PT AM-PAC Score:   /24  Current OT AM-PAC Score:   /24      DISCHARGE Disposition: Inpatient Hospice Unit: [facility]     LOC at discharge: Not Applicable  KATY Completed: Not Indicated    Notification completed in HENS/PAS?:  Not Applicable    IMM Completed:   Not Indicated    Transportation:  Transportation PLAN for discharge: EMS transportation   Mode of Transport: Ambulance stretcher - BLS  Reason for medical transport: Bed confined: Meets the following criteria 1) unable to get out of bed without assistance or ambulate, 2) unable to safely sit up in a wheelchair, 3) unable to maintain erect seating position in a chair for time needed for transport  Name of [address]: [employer]: [phone]  Time of Transport: [phone]    Transport form completed: Yes    Home Care:  [address] ordered at discharge: Not [address]: Not Applicable  Orders faxed: No    Durable Medical Equipment:  DME Provider: n/a  Equipment obtained during hospitalization: n/a     Home Oxygen and Respiratory Equipment:  Oxygen needed at discharge?: Not [address]: Not Applicable     Portable tank available for discharge?: Not Indicated    Dialysis:  Dialysis patient: No    Dialysis Center:  Not Applicable    Hospice Services:  Location: Inpatient Unit  Agency: [address]  Phone: [phone]    Consents signed: Yes    Referrals made at [facility] for outpatient continued care:  Not Applicable    Additional CM Notes:   Patient's family signed consents for hospice.   [facility] has arranged for pt to go to inpatient unit. Patient is scheduled for transport at [phone] via [address] The Plan for Transition of Care is related to the following treatment goals of Acute metabolic encephalopathy [C22.17]  Seizure ([address] [R56.9]    The Patient and/or patient representative Jaclyn Lafleur and her family were provided with a choice of provider and agrees with the discharge plan Yes    Freedom of choice list was provided with basic dialogue that supports the patient's individualized plan of care/goals and shares the quality data associated with the providers.  Yes    Care Transitions patient: No    Casper Mata RN  [facility] [email or web address]  Case Management Department  Ph: [phone]  Fax: [phone]

## 2022-06-14 NOTE — FLOWSHEET NOTE
06/14/22 0926   Encounter Summary   Service Provided For: Patient   Referral/Consult From: [facility]   Last Encounter  06/14/22  (6/14, temo )   Complexity of Encounter Low   Begin Time 0915   End Time  0920   Total Time Calculated 5 min   Encounter    Type Follow up   Spiritual/Emotional needs   Type Spiritual Support   No family at bedside.   Staff Tatiana Winn MA

## 2022-06-14 NOTE — PLAN OF CARE
Problem: Discharge Planning  Goal: Discharge to home or other facility with appropriate resources  6/14/2022 0435 by Tasha Ramirez RN  Outcome: Progressing  Flowsheets (Taken 6/13/2022 2015)  Discharge to home or other facility with appropriate resources:   Identify barriers to discharge with patient and caregiver   Arrange for needed discharge resources and transportation as appropriate   Identify discharge learning needs (meds, wound care, etc)  6/13/2022 1959 by Brenda Ansari RN  Outcome: Progressing     Problem: Pain  Goal: Verbalizes/displays adequate comfort level or baseline comfort level  6/14/2022 0435 by Tasha Ramirez RN  Outcome: Progressing  Flowsheets (Taken 6/13/2022 2015)  Verbalizes/displays adequate comfort level or baseline comfort level:   Encourage patient to monitor pain and request assistance   Assess pain using appropriate pain scale   Administer analgesics based on type and severity of pain and evaluate response   Implement non-pharmacological measures as appropriate and evaluate response  6/13/2022 1959 by Brenda Ansari RN  Outcome: Progressing     Problem: Skin/Tissue Integrity  Goal: Absence of new skin breakdown  Description: 1. Monitor for areas of redness and/or skin breakdown  2. Assess vascular access sites hourly  3. Every 4-6 hours minimum:  Change oxygen saturation probe site  4. Every 4-6 hours:  If on nasal continuous positive airway pressure, respiratory therapy assess nares and determine need for appliance change or resting period.   6/14/2022 0435 by Tasha Ramirez RN  Outcome: Progressing  6/13/2022 1959 by Brenda Ansari RN  Outcome: Progressing     Problem: Safety - Adult  Goal: Free from fall injury  6/14/2022 0435 by Tasha Ramirez RN  Outcome: Progressing  6/13/2022 1959 by Brenda Ansari RN  Outcome: Progressing     Problem: ABCDS Injury Assessment  Goal: Absence of physical injury  6/14/2022 0435 by Tasha Ramirez RN  Outcome: Progressing  6/13/2022 1959 by Andi Santoro RN  Outcome: Progressing     Problem: Chronic Conditions and Co-morbidities  Goal: Patient's chronic conditions and co-morbidity symptoms are monitored and maintained or improved  6/14/2022 0435 by Wei Seymour RN  Outcome: Progressing  Flowsheets (Taken 6/13/2022 2015)  Care Plan - Patient's Chronic Conditions and Co-Morbidity Symptoms are Monitored and Maintained or Improved:   Monitor and assess patient's chronic conditions and comorbid symptoms for stability, deterioration, or improvement   Collaborate with multidisciplinary team to address chronic and comorbid conditions and prevent exacerbation or deterioration   Update acute care plan with appropriate goals if chronic or comorbid symptoms are exacerbated and prevent overall improvement and discharge  6/13/2022 1959 by Andi Santoro RN  Outcome: Progressing     Problem: Confusion  Goal: Confusion, delirium, dementia, or psychosis is improved or at baseline  Description: INTERVENTIONS:  1. Assess for possible contributors to thought disturbance, including medications, impaired vision or hearing, underlying metabolic abnormalities, dehydration, psychiatric diagnoses, and notify attending LIP  2. [place] high risk fall precautions, as indicated  3. Provide frequent short contacts to provide reality reorientation, refocusing and direction  4. Decrease environmental stimuli, including noise as appropriate  5. Monitor and intervene to maintain adequate nutrition, hydration, elimination, sleep and activity  6. If unable to ensure safety without constant attention obtain sitter and review sitter guidelines with assigned personnel  7.  Initiate Psychosocial CNS and Spiritual Care consult, as indicated  6/14/2022 0435 by Wei Seymour RN  Outcome: Progressing  Flowsheets (Taken 6/13/2022 2015)  Effect of thought disturbance (confusion, delirium, dementia, or psychosis) are managed with adequate functional status:   Assess for contributors to thought disturbance, including medications, impaired vision or hearing, underlying metabolic abnormalities, dehydration, psychiatric diagnoses, notify [facility] high risk fall precautions, as indicated   Provide frequent short contacts to provide reality reorientation, refocusing and direction   Decrease environmental stimuli, including noise as appropriate   Monitor and intervene to maintain adequate nutrition, hydration, elimination, sleep and activity   If unable to ensure safety without constant attention obtain sitter and review sitter guidelines with assigned personnel  6/13/2022 1959 by Marla Westbrook RN  Outcome: Progressing     Problem: Neurosensory - Adult  Goal: Achieves stable or improved neurological status  6/14/2022 0435 by Marsha Amado RN  Outcome: Progressing  Flowsheets (Taken 6/13/2022 2015)  Achieves stable or improved neurological status:   Assess for and report changes in neurological status   Initiate measures to prevent increased intracranial pressure   Maintain blood pressure and fluid volume within ordered parameters to optimize cerebral perfusion and minimize risk of hemorrhage   Monitor temperature, glucose, and sodium.  Initiate appropriate interventions as ordered  6/13/2022 1959 by Marla Westbrook RN  Outcome: Progressing  Goal: Achieves maximal functionality and self care  6/14/2022 0435 by Marsha Amado RN  Outcome: Progressing  Flowsheets (Taken 6/13/2022 2015)  Achieves maximal functionality and self care:   Monitor swallowing and airway patency with patient fatigue and changes in neurological status   Encourage and assist patient to increase activity and self care with guidance from physical therapy/occupational therapy   Encourage visually impaired, hearing impaired and aphasic patients to use assistive/communication devices  6/13/2022 1959 by Marla Westbrook RN  Outcome: Progressing

## 2022-06-14 NOTE — PROGRESS NOTES
Speech Language Pathology    Attempted to see pt for dysphagia therapy. Reviewed chart, spoke with RN. Speech will sign-off at this time as patient/family choosing hospice. Kiko, [facility], [address] [place] [address], [phone]  Pg.  # [phone]

## 2022-06-14 NOTE — PROGRESS NOTES
Occupational Therapy and Physical Therapy    Orders received and chart reviewed. Spoke with RN, pt and family choosing hospice. Not appropriate for acute care therapy. Will sign off.     Aleks Nava, OTR/LIVAN Richards, [facility], DPT PT [phone]

## 2022-06-14 NOTE — DISCHARGE SUMMARY
Hospital Medicine Discharge Summary    Patient ID: David Mera      Patient's PCP: Adriel Jones MD    Admit Date: 6/11/2022     Discharge Date:   6/14/2022    Admitting Physician: Omid Larry MD     Discharge Physician: Omid Larry MD     Discharge Diagnoses: Active Hospital Problems    Diagnosis Date Noted    Delirium [R41.0]      Priority: Medium    Encounter for palliative care [Z51.5]      Priority: Medium    Encounter for hospice care discussion [Z71.89]      Priority: Medium    Acute cystitis without hematuria [N30.00] 06/12/2022     Priority: Medium    Acute metabolic encephalopathy [E89.36] 06/11/2022     Priority: Medium    Stroke-like symptoms [R29.90] 06/11/2022     Priority: Medium    Subarachnoid bleed ([address] [I60.9] 05/30/2022     Priority: Medium       The patient was seen and examined on day of discharge and this discharge summary is in conjunction with any daily progress note from day of discharge. Hospital Course: David Mera 80 y.o. female history of frequent falls, hypertension, history of CVA, recent hospitalization after a fall where she was noted to have ICH/SDH also noted to have closed fracture of neck of right femoral.  Was transferred to ARU for rehab on 6/7.       Rapid response was called on 6/11 evening patient was confused she was not following any command.     CT head increasing left-sided subdural hematoma. Remote ischemic changes in the left cerebral lesion appears stable     Patient transferred to regular floor for concern of seizure and change in mentation.     MRI brain which acute left MCA stroke. Family decided to pursue for hospice. Seen and examined family at bedside. Patient non verbal appears to be comfortable. Final Dx  #Acute left MCA stroke  #Seizure  #Worsening of subdural hematoma.   #UTI  #DENICE      Physical Exam Performed:     /61   Pulse 58   Temp 98.4 °F (36.9 °C) (Axillary)   Resp 16   Ht 5' 8\" (1.727 m)   Wt 123 lb 7.3 oz (56 kg)   SpO2 99%   BMI 18.77 kg/m²       General appearance: Elderly, frail   HEENT:  Normal cephalic, atraumatic without obvious deformity. Neck: Supple,   Respiratory: Appears to be breathing comfortably. Cardiovascular:  Regular rate and rhythm on telemetry   abdomen:   Musculoskeletal:  No clubbing, cyanosis or edema bilaterally. Skin:   Neurologic: Nonverbal.  Not following commands. Psychiatric: non verbal    Labs: For convenience and continuity at follow-up the following most recent labs are provided:      CBC:    Lab Results   Component Value Date    WBC 6.4 06/13/2022    HGB 11.7 06/13/2022    HCT 34.8 06/13/2022     06/13/2022       Renal:    Lab Results   Component Value Date     06/13/2022    K 5.0 06/13/2022     06/13/2022    CO2 22 06/13/2022    BUN 23 06/13/2022    CREATININE 1.1 06/13/2022    CALCIUM 8.5 06/13/2022    PHOS 3.2 06/06/2022         Significant Diagnostic Studies    Radiology:   MRI BRAIN WO CONTRAST   Final Result      1. Large acute left MCA territory infarct. Small acute right frontal lobe infarct. 2.  Stable small volume bilateral subarachnoid hemorrhage better seen on prior CT. 3.  Right frontal and left cerebral convexity subdural collections are not well evaluated due to extensive motion artifact on FLAIR sequence but appear to represent subdural effusions or less likely hygromas given the presence of leakage of contrast on    prior CT. Stable 3 mm rightward midline shift. Discussed with the patient's nurse [address] [place] [address]. CT HEAD WO CONTRAST   Final Result      1. Unchanged small volume acute subarachnoid hemorrhage in right frontoparietal and bilateral occipital lobe sulci.    2.  Unchanged size of right frontal convexity and left cerebral convexity subdural collections with new diffusely increased density secondary to interval CT angiogram. These may indicate chronic subdural effusions or hygromas rather than chronic subdural hematomas due to the leakage of contrast.   3.  Unchanged 3 mm rightward midline shift. Consults:     IP CONSULT TO NEUROLOGY  IP CONSULT TO NEUROSURGERY  IP CONSULT TO PALLIATIVE CARE  IP CONSULT TO HOSPICE    Disposition:  Inpatient hospice     Condition at Discharge: Terminal    Discharge Instructions/Follow-up:      Code Status:  DNR-CC     Activity: activity as tolerated    Diet: regular diet      Discharge Medications:     Current Discharge Medication List          Time Spent on discharge is more than 15 minutes in the examination, evaluation, counseling and review of medications and discharge plan. Signed:    Vicky Ornelas MD   6/14/2022      Thank you Светлана Hernandez MD for the opportunity to be involved in this patient's care. If you have any questions or concerns please feel free to contact me at [phone].

## 2022-06-14 NOTE — PROGRESS NOTES
Discharge note: Patient has been seen by doctor. Discharge order obtained, and discharge instructions reviewed. Plan of care reviewed with family. Pt being transported to hospice by [address] [facility] at this time. Transported with [facility] and [facility]. Belongings sent home with daughter in law [place]. Report given to ambulance at bedside.

## 2022-06-14 NOTE — FLOWSHEET NOTE
06/14/22 1243   Encounter Summary   Service Provided For: Patient and family together   Referral/Consult From: [facility]   Last Encounter  06/14/22  (6/14, temo )   Complexity of Encounter Moderate   Begin Time 1235   End Time  1240   Total Time Calculated 5 min   Encounter    Type Follow up   Spiritual/Emotional needs   Type Spiritual Support   Assessment/Intervention/Outcome   Assessment Calm;Coping   Intervention Active listening   Outcome Comfort;Expressed Gratitude   No needs at this time.    Staff Anisha Betancur MA

## (undated) DEVICE — BANDAGE COMPR W4INXL15FT BGE E SGL LAYERED CLP CLSR

## (undated) DEVICE — STRIP,CLOSURE,WOUND,MEDI-STRIP,1/2X4: Brand: MEDLINE

## (undated) DEVICE — BANDAGE COMPR W4INXL12FT E DISP ESMARCH EVEN

## (undated) DEVICE — COVER LT HNDL BLU PLAS

## (undated) DEVICE — GLOVE SURG SZ 6 CRM LTX FREE POLYISOPRENE POLYMER BEAD ANTI

## (undated) DEVICE — Z DISCONTINUED USE 2275686 GLOVE SURG SZ 8 L12IN FNGR THK13MIL WHT ISOLEX POLYISOPRENE

## (undated) DEVICE — KIRSCHNER WIRE
Type: IMPLANTABLE DEVICE | Site: WRIST | Status: NON-FUNCTIONAL
Brand: VARIAX
Removed: 2020-08-10

## (undated) DEVICE — SOLUTION IV IRRIG POUR BRL 0.9% SODIUM CHL 2F7124

## (undated) DEVICE — SPLINT ORTH W3XL12IN LAYERED FBRGLS FOAM PD BRTH BK MOLD

## (undated) DEVICE — 3M™ STERI-STRIP™ COMPOUND BENZOIN TINCTURE 40 BAGS/CARTON 4 CARTONS/CASE C1544: Brand: 3M™ STERI-STRIP™

## (undated) DEVICE — UNTHREADED GUIDE WIRE
Type: IMPLANTABLE DEVICE | Site: WRIST | Status: NON-FUNCTIONAL
Brand: FIXOS
Removed: 2020-08-10

## (undated) DEVICE — SUTURE VCRL SZ 3-0 L18IN ABSRB UD L26MM SH 1/2 CIR J864D

## (undated) DEVICE — SPONGE GZ W4XL4IN COT 12 PLY TYP VII WVN C FLD DSGN

## (undated) DEVICE — NEEDLE HYPO 25GA L1.5IN BVL ORIENTED ECLIPSE

## (undated) DEVICE — ELECTRODE PT RET AD L9FT HI MOIST COND ADH HYDRGEL CORDED

## (undated) DEVICE — GLOVE SURG SZ 8 CRM LTX FREE POLYISOPRENE POLYMER BEAD ANTI

## (undated) DEVICE — DRAPE HND W114XL142IN BLU POLYPR W O PCH FEN CRD AND TB HLDR

## (undated) DEVICE — SYRINGE IRRIG 60ML SFT PLIABLE BLB EZ TO GRP 1 HND USE W/

## (undated) DEVICE — Z DISCONTINUED PER MEDLINE (LOW STOCK)  USE 2422770 DRAPE C ARM W54XL78IN FOR FLROSCN

## (undated) DEVICE — PADDING UNDERCAST W4INXL4YD 100% COT CRIMPED FINISH WBRL II

## (undated) DEVICE — GLOVE SURG SZ 6 L12IN FNGR THK79MIL GRN LTX FREE

## (undated) DEVICE — INTENDED FOR TISSUE SEPARATION, AND OTHER PROCEDURES THAT REQUIRE A SHARP SURGICAL BLADE TO PUNCTURE OR CUT.: Brand: BARD-PARKER ® STAINLESS STEEL BLADES

## (undated) DEVICE — SHEET,DRAPE,53X77,STERILE: Brand: MEDLINE

## (undated) DEVICE — DRILL, AO, SCALED: Brand: VARIAX

## (undated) DEVICE — PENCIL ES L3M BTTN SWCH S STL HEX LOK BLDE ELECTRD HOLSTER

## (undated) DEVICE — TUBING, SUCTION, 1/4" X 12', STRAIGHT: Brand: MEDLINE

## (undated) DEVICE — MINOR SET UP PK

## (undated) DEVICE — DRESSING,GAUZE,XEROFORM,CURAD,1"X8",ST: Brand: CURAD

## (undated) DEVICE — GOWN SIRUS NONREIN LG W/TWL: Brand: MEDLINE INDUSTRIES, INC.

## (undated) DEVICE — MERCY HEALTH WEST TURNOVER: Brand: MEDLINE INDUSTRIES, INC.

## (undated) DEVICE — ZIMMER® STERILE DISPOSABLE TOURNIQUET CUFF WITH PLC, DUAL PORT, SINGLE BLADDER, 18 IN. (46 CM)

## (undated) DEVICE — CANISTER, RIGID, 1200CC: Brand: MEDLINE INDUSTRIES, INC.